# Patient Record
Sex: MALE | Race: BLACK OR AFRICAN AMERICAN | Employment: FULL TIME | URBAN - METROPOLITAN AREA
[De-identification: names, ages, dates, MRNs, and addresses within clinical notes are randomized per-mention and may not be internally consistent; named-entity substitution may affect disease eponyms.]

---

## 2017-01-31 RX ORDER — METOPROLOL SUCCINATE 50 MG/1
TABLET, EXTENDED RELEASE ORAL
Qty: 31 TAB | Status: SHIPPED | OUTPATIENT
Start: 2017-01-31 | End: 2018-02-07 | Stop reason: SDUPTHER

## 2017-03-02 RX ORDER — POTASSIUM CHLORIDE 750 MG/1
TABLET, FILM COATED, EXTENDED RELEASE ORAL
Qty: 28 TAB | Status: SHIPPED | OUTPATIENT
Start: 2017-03-02 | End: 2018-03-05 | Stop reason: SDUPTHER

## 2017-03-29 RX ORDER — INDAPAMIDE 1.25 MG/1
TABLET, FILM COATED ORAL
Qty: 31 TAB | Status: SHIPPED | OUTPATIENT
Start: 2017-03-29 | End: 2018-03-30 | Stop reason: SDUPTHER

## 2017-04-28 RX ORDER — LISINOPRIL 40 MG/1
TABLET ORAL
Qty: 30 TAB | Status: SHIPPED | OUTPATIENT
Start: 2017-04-28 | End: 2018-04-30 | Stop reason: SDUPTHER

## 2017-06-20 ENCOUNTER — OFFICE VISIT (OUTPATIENT)
Dept: INTERNAL MEDICINE CLINIC | Age: 57
End: 2017-06-20

## 2017-06-20 VITALS
WEIGHT: 212.8 LBS | OXYGEN SATURATION: 96 % | TEMPERATURE: 99.1 F | DIASTOLIC BLOOD PRESSURE: 94 MMHG | BODY MASS INDEX: 32.25 KG/M2 | HEART RATE: 60 BPM | RESPIRATION RATE: 16 BRPM | HEIGHT: 68 IN | SYSTOLIC BLOOD PRESSURE: 155 MMHG

## 2017-06-20 DIAGNOSIS — R41.3 MEMORY IMPAIRMENT: ICD-10-CM

## 2017-06-20 DIAGNOSIS — E66.9 OBESITY (BMI 30.0-34.9): ICD-10-CM

## 2017-06-20 DIAGNOSIS — I10 ESSENTIAL HYPERTENSION: Primary | ICD-10-CM

## 2017-06-20 DIAGNOSIS — R73.03 PREDIABETES: ICD-10-CM

## 2017-06-20 DIAGNOSIS — E78.2 MIXED HYPERLIPIDEMIA: ICD-10-CM

## 2017-06-20 DIAGNOSIS — F31.70 BIPOLAR DISORDER IN FULL REMISSION, MOST RECENT EPISODE UNSPECIFIED TYPE (HCC): ICD-10-CM

## 2017-06-20 DIAGNOSIS — G40.909 SEIZURE DISORDER (HCC): ICD-10-CM

## 2017-06-20 RX ORDER — AMLODIPINE BESYLATE 10 MG/1
TABLET ORAL
Qty: 31 TAB | Refills: 11 | Status: SHIPPED | OUTPATIENT
Start: 2017-06-20 | End: 2018-05-30 | Stop reason: SDUPTHER

## 2017-06-20 RX ORDER — HYDRALAZINE HYDROCHLORIDE 100 MG/1
TABLET, FILM COATED ORAL
Qty: 93 TAB | Refills: 11 | Status: SHIPPED | OUTPATIENT
Start: 2017-06-20 | End: 2018-05-30 | Stop reason: SDUPTHER

## 2017-06-20 NOTE — MR AVS SNAPSHOT
Visit Information Date & Time Provider Department Dept. Phone Encounter #  
 6/20/2017  2:30 PM Alma Rosa Sharma [facility] Internal Medicine [phone] [id number] Follow-up Instructions Return in about 2 weeks (around 7/4/2017). Upcoming Health Maintenance Date Due FOBT Q 1 YEAR AGE 50-75 4/7/2017 INFLUENZA AGE 9 TO ADULT 8/1/2017 DTaP/Tdap/Td series (2 - Td) 5/20/2026 Allergies as of 6/20/2017  Review Complete On: 6/20/2017 By: Mulu Ferreira, DO No Known Allergies Current Immunizations  Reviewed on 12/6/2016 Name Date Influenza Nasal Vaccine 10/15/2016 Not reviewed this visit You Were Diagnosed With   
  
 Codes Comments Essential hypertension    -  Primary ICD-10-CM: I10 
ICD-9-CM: 401.9 Mixed hyperlipidemia     ICD-10-CM: E78.2 ICD-9-CM: 272.2 Obesity (BMI 30.0-34.9)     ICD-10-CM: J14.5 ICD-9-CM: 278.00 Prediabetes     ICD-10-CM: R73.03 
ICD-9-CM: 790.29 Seizure disorder ([facility]     ICD-10-CM: G40.909 ICD-9-CM: 345.90 Memory impairment     ICD-10-CM: R41.3 ICD-9-CM: 780.93 Bipolar disorder in full remission, most recent episode unspecified type ([facility]     ICD-10-CM: F31.70 ICD-9-CM: 296.80 Vitals BP Pulse Temp Resp Height(growth percentile) Weight(growth percentile) (!) 155/94 (BP 1 Location: Right arm, BP Patient Position: Sitting) 60 99.1 °F (37.3 °C) (Oral) 16 5' 8\" (1.727 m) 212 lb 12.8 oz (96.5 kg) SpO2 BMI Smoking Status 96% 32.36 kg/m2 Never Smoker Vitals History BMI and BSA Data Body Mass Index Body Surface Area  
 32.36 kg/m 2 2.15 m 2 Preferred Pharmacy Pharmacy Name Phone [facility] [address] [phone] Your Updated Medication List  
  
   
This list is accurate as of: 6/20/17  3:32 PM.  Always use your most recent med list.  
  
  
  
  
 ALAWAY 0.025 % (0.035 %) ophthalmic solution Generic drug:  ketotifen Administer 1 Drop to both eyes two (2) times a day. amLODIPine 10 mg tablet Commonly known as:  Gómez Durie TAKE 1 TABLET BY MOUTH EVERY MORNING  
  
 ammonium lactate 12 % lotion Commonly known as:  LAC-HYDRIN  
  
 BUSPAR 15 mg tablet Generic drug:  busPIRone Take 15 mg by mouth two (2) times a day. hydrALAZINE 100 mg tablet Commonly known as:  APRESOLINE  
TAKE 1 TABLET BY MOUTH 3 times daily  
  
 indapamide 1.25 mg tablet Commonly known as:  LOZOL  
TAKE 1 TABLET BY MOUTH DAILY  
  
 KENDALL'S BABY SHAMPOO EX  
by Apply Externally route. latanoprost 0.005 % ophthalmic solution Commonly known as:  XALATAN  
  
 lisinopril 40 mg tablet Commonly known as:  PRINIVIL, ZESTRIL  
TAKE 1 TABLET BY MOUTH DAILY  
  
 LUMIGAN 0.03 % ophthalmic drops Generic drug:  bimatoprost  
Administer 1 Drop to both eyes every evening. metoprolol succinate 50 mg XL tablet Commonly known as:  TOPROL-XL  
TAKE ONE TABLET BY MOUTH AT BEDTIME  
  
 NAMENDA XR 28 mg capsule Generic drug:  memantine ER  
  
 PATANOL 0.1 % ophthalmic solution Generic drug:  olopatadine Administer 2 Drops to both eyes two (2) times a day. * PHENobarbital 60 mg tablet Commonly known as:  LUMINAL Take 2 Tabs by mouth nightly. Max Daily Amount: 120 mg.  
  
 * PHENobarbital 64.8 mg tablet Commonly known as:  LUMINAL  
  
 potassium chloride SR 10 mEq tablet Commonly known as:  KLOR-CON 10  
TAKE 1 TABLET BY MOUTH DAILY  
  
 XANAX 0.25 mg tablet Generic drug:  ALPRAZolam  
Take 0.125 mg by mouth two (2) times a day. ZyPREXA 10 mg tablet Generic drug:  OLANZapine Take 10 mg by mouth nightly. * Notice: This list has 2 medication(s) that are the same as other medications prescribed for you. Read the directions carefully, and ask your doctor or other care provider to review them with you. Prescriptions Sent to Pharmacy Refills hydrALAZINE (APRESOLINE) 100 mg tablet 11 Sig: TAKE 1 TABLET BY MOUTH 3 times daily Class: Normal  
 Pharmacy: [address] [place] [address] Ph #: [phone]  
 amLODIPine (NORVASC) 10 mg tablet 11 Sig: TAKE 1 TABLET BY MOUTH EVERY MORNING Class: Normal  
 [address] [facility]: [phone] Follow-up Instructions Return in about 2 weeks (around 7/4/2017). Introducing [facility]! Georgina Kurtz introduces [employer] patient portal. Now you can access parts of your medical record, email your doctor's office, and request medication refills online. 1. In your internet browser, go to [email or web address] 2. Click on the First Time User? Click Here link in the Sign In box. You will see the New Member Sign Up page. 3. Enter your [employer] Access Code exactly as it appears below. You will not need to use this code after youve completed the sign-up process. If you do not sign up before the expiration date, you must request a new code. · [employer] Access Code: [email or web address] Expires: 9/18/2017  3:20 PM 
 
4. Enter the last four digits of your Social Security Number (xxxx) and Date of Birth (mm/dd/yyyy) as indicated and click Submit. You will be taken to the next sign-up page. 5. Create a [employer] ID. This will be your [employer] login ID and cannot be changed, so think of one that is secure and easy to remember. 6. Create a [employer] password. You can change your password at any time. 7. Enter your Password Reset Question and Answer. This can be used at a later time if you forget your password. 8. Enter your e-mail address. You will receive e-mail notification when new information is available in [address] 9. Click Sign Up. You can now view and download portions of your medical record.  
10. Click the Download Summary menu link to download a portable copy of your medical information. If you have questions, please visit the Frequently Asked Questions section of the [employer] website. Remember, [employer] is NOT to be used for urgent needs. For medical emergencies, dial 911. Now available from your iPhone and Android! Please provide this summary of care documentation to your next provider. Your primary care clinician is listed as Yakov Flores. If you have any questions after today's visit, please call [phone].

## 2017-06-20 NOTE — PROGRESS NOTES
Health Maintenance Due   Topic Date Due    FOBT Q 1 YEAR AGE 50-75  04/07/2017       Chief Complaint   Patient presents with    Form Completion     Pt needs paperwork for [facility] Medication Evaluation     Pt wants to discuss blood pressure medications       1. Have you been to the ER, urgent care clinic since your last visit? Hospitalized since your last visit? No    2. Have you seen or consulted any other health care providers outside of the [address] [place] [address] since your last visit? Include any pap smears or colon screening. No      Patient is accompanied by counselor  I have received verbal consent from Yvonne Ford to discuss any/all medical information while they are present in the room.

## 2017-06-30 NOTE — PROGRESS NOTES
HISTORY OF PRESENT ILLNESS  Jada Coronel is a 60 y.o. male. Pt. comes in with his  for f/u. Has multiple medical problems. His BP is high despite taking medications. No headaches or other related symptoms. No recent seizures. Memory is poor. Followed by psychiatry for bipolar disorder. Needs a form filled to go to camp. Reports compliance with medications but not his diet. Med list and most recent labs/studies reviewed with pt. A1c was 5.7. Lipids are high. Denies smoking and using alcohol. No allergies. Trying to be active physically to control weight. Reports no other new c/o. Medication Evaluation   Pertinent negatives include no chest pain, no abdominal pain, no headaches and no shortness of breath. Hypertension    Pertinent negatives include no chest pain, no headaches, no dizziness and no shortness of breath. Follow Up Chronic Condition   Pertinent negatives include no chest pain, no abdominal pain, no headaches and no shortness of breath. Review of Systems   Constitutional: Negative. HENT: Negative for congestion. Eyes: Negative. Respiratory: Negative for cough and shortness of breath. Cardiovascular: Negative for chest pain and leg swelling. Gastrointestinal: Negative for abdominal pain and heartburn. Genitourinary: Negative for dysuria and frequency. Musculoskeletal: Negative for back pain, falls and joint pain. Skin: Negative for rash. Neurological: Positive for seizures. Negative for dizziness, sensory change and headaches. Psychiatric/Behavioral: Positive for memory loss. Negative for depression. The patient is nervous/anxious. The patient does not have insomnia. All other systems reviewed and are negative. Physical Exam   Constitutional: He appears well-developed and well-nourished. No distress. obese   HENT:   Head: Normocephalic and atraumatic.    Mouth/Throat: Oropharynx is clear and moist.   Eyes: Conjunctivae and EOM are normal. Pupils are equal, round, and reactive to light. No scleral icterus. Poor vision   Neck: Normal range of motion. Neck supple. No JVD present. No thyromegaly present. Cardiovascular: Normal rate, regular rhythm, normal heart sounds and intact distal pulses. No murmur heard. Pulmonary/Chest: Effort normal and breath sounds normal. No respiratory distress. He has no wheezes. He has no rales. Abdominal: Soft. Bowel sounds are normal. He exhibits no distension. There is no tenderness. obese   Musculoskeletal: He exhibits no edema or tenderness. Neurological: He is alert. No cranial nerve deficit. Coordination normal.   A+O to name, Wes, not date   Doesn't know president's name   Skin: Skin is warm and dry. No rash noted. Psychiatric: He has a normal mood and affect. His behavior is normal.   Slow speech  Chronic cognitive deficit   Nursing note and vitals reviewed. ASSESSMENT and PLAN  Chris Antunez was seen today for form completion, medication evaluation, hypertension and follow up chronic condition. Diagnoses and all orders for this visit:    Essential hypertension    Mixed hyperlipidemia    Obesity (BMI 30.0-34. 9)    Prediabetes    Seizure disorder ([address]    Memory impairment    Bipolar disorder in full remission, most recent episode unspecified type ([address]    Other orders  -     hydrALAZINE (APRESOLINE) 100 mg tablet; TAKE 1 TABLET BY MOUTH 3 times daily  -     amLODIPine (NORVASC) 10 mg tablet; TAKE 1 TABLET BY MOUTH EVERY MORNING      Follow-up Disposition:  Return in about 2 weeks (around 7/4/2017).    lab results and schedule of future lab studies reviewed with patient  reviewed diet, exercise and weight control  reviewed medications and side effects in detail  Monitor BP at home and keep a log  Discussed importance of watching salt and sodium intake, losing weight  F/u with other MD's as scheduled

## 2017-07-07 ENCOUNTER — OFFICE VISIT (OUTPATIENT)
Dept: INTERNAL MEDICINE CLINIC | Age: 57
End: 2017-07-07

## 2017-07-07 VITALS
HEART RATE: 60 BPM | TEMPERATURE: 98.4 F | DIASTOLIC BLOOD PRESSURE: 84 MMHG | BODY MASS INDEX: 31.95 KG/M2 | WEIGHT: 210.8 LBS | HEIGHT: 68 IN | SYSTOLIC BLOOD PRESSURE: 135 MMHG | OXYGEN SATURATION: 97 %

## 2017-07-07 DIAGNOSIS — F31.70 BIPOLAR DISORDER IN FULL REMISSION, MOST RECENT EPISODE UNSPECIFIED TYPE (HCC): ICD-10-CM

## 2017-07-07 DIAGNOSIS — E78.2 MIXED HYPERLIPIDEMIA: ICD-10-CM

## 2017-07-07 DIAGNOSIS — I10 ESSENTIAL HYPERTENSION: Primary | ICD-10-CM

## 2017-07-07 DIAGNOSIS — Z00.00 MEDICARE ANNUAL WELLNESS VISIT, INITIAL: ICD-10-CM

## 2017-07-07 DIAGNOSIS — E66.9 OBESITY (BMI 30.0-34.9): ICD-10-CM

## 2017-07-07 DIAGNOSIS — R73.9 HYPERGLYCEMIA: ICD-10-CM

## 2017-07-07 DIAGNOSIS — N40.1 BENIGN PROSTATIC HYPERPLASIA WITH LOWER URINARY TRACT SYMPTOMS, UNSPECIFIED MORPHOLOGY: ICD-10-CM

## 2017-07-07 NOTE — PATIENT INSTRUCTIONS
PATIENT INSTRUCTIONS:  Todays date:  Medicare Part B Preventive Services Guidelines/Limitations Date last completed and Frequency Due Date   Bone Mass Measurement  (age 72 & older, biennial) Requires diagnosis related to osteoporosis or estrogen deficiency. Biennial benefit unless patient has history of long-term glucocorticoid tx or baseline is needed because initial test was by other method or for  patients with vertebral abnormalities on x-ray   N/A      Recommended every 2 years N/A          As recommended by your PCP or Specialist     Cardiovascular Screening Blood Tests (every 5 years)  Total cholesterol, HDL, Triglycerides   Order as a panel if possible Completed:   11-1-16    As recommended by your PCP DUE: 2021      As recommended by your PCP or Specialist   HIV Screening  HIV Screening (includes patients at high risk and includes any patient that requests the test and pregnant women   Covered once every 12 months. N/A N/A   Hepatitis C screening tests Indicated for patients with at least one of the following: current or past history of IV drug use, those who had blood transfusion before 1992, those born between [facility]. Declined Declined   Colorectal Cancer Screening  -Fecal occult blood test (annual)  -Flexible sigmoidoscopy (5y)  -Screening colonoscopy (10y)  -Barium Enema Age 49-80;  After age [de-identified] if history of abnormal results Not Completed:        Recommended every 5 to 10 years  Due: NOW          As recommended by your PCP or Specialist     Hepatitis B vaccines  (covered for patients at high risk- hemophilia, ESRD, DM, body fluid contact    _______________  Prostate Cancer Screening (annually up to age 76)  -Digital rectal exam  -Prostate specific antigen (PSA)   Three shot series covered once              __________________  Annually (age 48 or over), ARLEEN not paid separately when covered E/M service is provided on the same date N/A                _______________  Completed:  11-2-16      Recommended  annually N/A                ______________  Due:  November 2017   Seasonal Influenza Vaccination (annually)  Completed:   10-15-16    Recommended Annually   Due:   Fall 2017   TDAP Vaccination Covered by Medicare part D through the pharmacy- PCP provides prescription Not Completed:       Recommended every 10 years   Declined   Zoster (Shingles) Vaccination Covered by Medicare Part D through the pharmacy- PCP provides prescription Not Completed:       Recommended once over age 61  Declined    This is a one-time vaccine   Pneumococcal Vaccination (once after 72)  Not Completed:       Recommended once over the age of 72     Declined           Prevnar 13 vaccine  Prevnar 15        Recommended once over the age of 72 Due: 1 year following Pneumococcal vaccine      This is a one-time vaccine       Lung Cancer Screening-with low dose CT for patientswho meet all criteria:   -Age 50-69  -either a current smoker or have quit in the past 15 yrs  -have a smoking history of 30 pack years or more   Covered every 12 months N/A N/A     Please contact RN/Nurse Navigator with any questions about your visit or instructions today. Thank you for the opportunity for us to participate in your care.

## 2017-07-07 NOTE — MR AVS SNAPSHOT
Visit Information Date & Time Provider Department Dept. Phone Encounter #  
 7/7/2017  3:00 PM Fred Santo, [address] [facility] Internal Medicine [phone] [id number] Follow-up Instructions Return in about 4 months (around 11/7/2017). Upcoming Health Maintenance Date Due FOBT Q 1 YEAR AGE 50-75 4/7/2017 INFLUENZA AGE 9 TO ADULT 8/1/2017 DTaP/Tdap/Td series (2 - Td) 5/20/2026 Allergies as of 7/7/2017  Review Complete On: 7/7/2017 By: Fred Santo, DO No Known Allergies Current Immunizations  Reviewed on 12/6/2016 Name Date Influenza Nasal Vaccine 10/15/2016 Not reviewed this visit You Were Diagnosed With   
  
 Codes Comments Essential hypertension    -  Primary ICD-10-CM: I10 
ICD-9-CM: 401.9 Hyperglycemia     ICD-10-CM: R73.9 ICD-9-CM: 790.29 Mixed hyperlipidemia     ICD-10-CM: E78.2 ICD-9-CM: 272.2 Obesity (BMI 30.0-34.9)     ICD-10-CM: T99.4 ICD-9-CM: 278.00 Bipolar disorder in full remission, most recent episode unspecified type ([facility]     ICD-10-CM: F31.70 ICD-9-CM: 296.80 Benign prostatic hyperplasia with lower urinary tract symptoms, unspecified morphology     ICD-10-CM: N40.1 ICD-9-CM: 600.01 Vitals BP Pulse Temp Height(growth percentile) Weight(growth percentile) SpO2  
 135/84 (BP 1 Location: Left arm, BP Patient Position: Sitting) 60 98.4 °F (36.9 °C) (Oral) 5' 8\" (1.727 m) 210 lb 12.8 oz (95.6 kg) 97% BMI Smoking Status 32.05 kg/m2 Never Smoker BMI and BSA Data Body Mass Index Body Surface Area 32.05 kg/m 2 2.14 m 2 Preferred Pharmacy Pharmacy Name Phone [facility] [address] [phone] Your Updated Medication List  
  
   
This list is accurate as of: 7/7/17  3:33 PM.  Always use your most recent med list.  
  
  
  
  
 ALAWAY 0.025 % (0.035 %) ophthalmic solution Generic drug:  ketotifen Administer 1 Drop to both eyes two (2) times a day. amLODIPine 10 mg tablet Commonly known as:  Delcie Wharton TAKE 1 TABLET BY MOUTH EVERY MORNING  
  
 ammonium lactate 12 % lotion Commonly known as:  LAC-HYDRIN  
  
 BUSPAR 15 mg tablet Generic drug:  busPIRone Take 15 mg by mouth two (2) times a day. hydrALAZINE 100 mg tablet Commonly known as:  APRESOLINE  
TAKE 1 TABLET BY MOUTH 3 times daily  
  
 indapamide 1.25 mg tablet Commonly known as:  LOZOL  
TAKE 1 TABLET BY MOUTH DAILY  
  
 KENDALL'S BABY SHAMPOO EX  
by Apply Externally route. latanoprost 0.005 % ophthalmic solution Commonly known as:  XALATAN  
  
 lisinopril 40 mg tablet Commonly known as:  PRINIVIL, ZESTRIL  
TAKE 1 TABLET BY MOUTH DAILY  
  
 LUMIGAN 0.03 % ophthalmic drops Generic drug:  bimatoprost  
Administer 1 Drop to both eyes every evening. metoprolol succinate 50 mg XL tablet Commonly known as:  TOPROL-XL  
TAKE ONE TABLET BY MOUTH AT BEDTIME  
  
 NAMENDA XR 28 mg capsule Generic drug:  memantine ER  
  
 PATANOL 0.1 % ophthalmic solution Generic drug:  olopatadine Administer 2 Drops to both eyes two (2) times a day. * PHENobarbital 60 mg tablet Commonly known as:  LUMINAL Take 2 Tabs by mouth nightly. Max Daily Amount: 120 mg.  
  
 * PHENobarbital 64.8 mg tablet Commonly known as:  LUMINAL  
  
 potassium chloride SR 10 mEq tablet Commonly known as:  KLOR-CON 10  
TAKE 1 TABLET BY MOUTH DAILY  
  
 XANAX 0.25 mg tablet Generic drug:  ALPRAZolam  
Take 0.125 mg by mouth two (2) times a day. ZyPREXA 10 mg tablet Generic drug:  OLANZapine Take 10 mg by mouth nightly. * Notice: This list has 2 medication(s) that are the same as other medications prescribed for you. Read the directions carefully, and ask your doctor or other care provider to review them with you. We Performed the Following ALT [id number] CPT(R)] AST C9944338 CPT(R)] HEMOGLOBIN A1C WITH EAG [43405 CPT(R)] LIPID PANEL [72636 CPT(R)] METABOLIC PANEL, BASIC [20532 CPT(R)] PROSTATE SPECIFIC AG (PSA) [id number] CPT(R)] Follow-up Instructions Return in about 4 months (around 11/7/2017). Patient Instructions PATIENT INSTRUCTIONS: 
Todays date: 
Medicare Part B Preventive Services Guidelines/Limitations Date last completed and Frequency Due Date Bone Mass Measurement 
(age 72 & older, biennial) Requires diagnosis related to osteoporosis or estrogen deficiency. Biennial benefit unless patient has history of long-term glucocorticoid tx or baseline is needed because initial test was by other method or for 
patients with vertebral abnormalities on x-ray N/A Recommended every 2 years N/A As recommended by your PCP or Specialist 
  
Cardiovascular Screening Blood Tests (every 5 years) Total cholesterol, HDL, Triglycerides Order as a panel if possible Completed:  
11-1-16 As recommended by your PCP DUE: 2021 As recommended by your PCP or Specialist  
HIV Screening HIV Screening (includes patients at high risk and includes any patient that requests the test and pregnant women Covered once every 12 months. N/A N/A Hepatitis C screening tests Indicated for patients with at least one of the following: current or past history of IV drug use, those who had blood transfusion before 1992, those born between [facility]. Declined Declined Colorectal Cancer Screening 
-Fecal occult blood test (annual) -Flexible sigmoidoscopy (5y) 
-Screening colonoscopy (10y) -Barium Enema Age 49-80; After age [de-identified] if history of abnormal results Not Completed:  
  
 
Recommended every 5 to 10 years  Due: NOW As recommended by your PCP or Specialist 
  
Hepatitis B vaccines (covered for patients at high risk- hemophilia, ESRD, DM, body fluid contact 
 
_______________ Prostate Cancer Screening (annually up to age 76) -Digital rectal exam 
 -Prostate specific antigen (PSA) Three shot series covered once 
 
 
 
 
 
 
__________________ Annually (age 48 or over), ARLEEN not paid separately when covered E/M service is provided on the same date N/A 
 
 
 
 
 
 
 
_______________ Completed: 
11-2-16 Recommended 
annually N/A 
 
 
 
 
 
 
 
______________ Due: 
November 2017 Seasonal Influenza Vaccination (annually)  Completed:  
10-15-16 Recommended Annually Due:  
Fall 2017 TDAP Vaccination Covered by Medicare part D through the pharmacy- PCP provides prescription Not Completed:  
 
 
Recommended every 10 years Declined Zoster (Shingles) Vaccination Covered by Medicare Part D through the pharmacy- PCP provides prescription Not Completed:  
 
 
Recommended once over age 61  Declined This is a one-time vaccine Pneumococcal Vaccination (once after 65)  Not Completed:  
 
 
Recommended once over the age of 72 Declined Prevnar 13 vaccine  Prevnar 13  Recommended once over the age of 72 Due: 1 year following Pneumococcal vaccine This is a one-time vaccine Lung Cancer Screening-with low dose CT for patientswho meet all criteria:  
-Age 50-69 
-either a current smoker or have quit in the past 15 yrs 
-have a smoking history of 30 pack years or more Covered every 12 months N/A N/A Please contact RN/Nurse Navigator with any questions about your visit or instructions today. Thank you for the opportunity for us to participate in your care. Introducing [facility]! [facility] introduces [employer] patient portal. Now you can access parts of your medical record, email your doctor's office, and request medication refills online. 1. In your internet browser, go to [email or web address] 2. Click on the First Time User? Click Here link in the Sign In box. You will see the New Member Sign Up page. 3. Enter your [employer] Access Code exactly as it appears below.  You will not need to use this code after youve completed the sign-up process. If you do not sign up before the expiration date, you must request a new code. · [employer] Access Code: [email or web address] Expires: 9/18/2017  3:20 PM 
 
4. Enter the last four digits of your Social Security Number (xxxx) and Date of Birth (mm/dd/yyyy) as indicated and click Submit. You will be taken to the next sign-up page. 5. Create a [employer] ID. This will be your [employer] login ID and cannot be changed, so think of one that is secure and easy to remember. 6. Create a [employer] password. You can change your password at any time. 7. Enter your Password Reset Question and Answer. This can be used at a later time if you forget your password. 8. Enter your e-mail address. You will receive e-mail notification when new information is available in [address] 9. Click Sign Up. You can now view and download portions of your medical record. 10. Click the Download Summary menu link to download a portable copy of your medical information. If you have questions, please visit the Frequently Asked Questions section of the [employer] website. Remember, [employer] is NOT to be used for urgent needs. For medical emergencies, dial 911. Now available from your iPhone and Android! Please provide this summary of care documentation to your next provider. Your primary care clinician is listed as Rosalva Mendoza. If you have any questions after today's visit, please call [phone].

## 2017-07-07 NOTE — PROGRESS NOTES
Patient is here for f/u on b/p.     Visit Vitals    /84 (BP 1 Location: Left arm, BP Patient Position: Sitting)    Pulse 60    Temp 98.4 °F (36.9 °C) (Oral)    Ht 5' 8\" (1.727 m)    Wt 210 lb 12.8 oz (95.6 kg)    SpO2 97%    BMI 32.05 kg/m2

## 2017-07-07 NOTE — PROGRESS NOTES
Medicare Wellness Visit      Denis Wise is a 60 y.o. male and presents for Annual Medicare Wellness Visit. Assessment of cognitive impairment: Alert and oriented x 3. Depression Screen:   PHQ over the last two weeks 7/7/2017   Little interest or pleasure in doing things Not at all   Feeling down, depressed or hopeless Not at all   Total Score PHQ 2 0       Fall Risk Assessment:    Fall Risk Assessment, last 12 mths 7/7/2017   Able to walk? Yes   Fall in past 12 months? No       Abuse Screen:   Abuse Screening Questionnaire 7/7/2017   Do you ever feel afraid of your partner? N   Are you in a relationship with someone who physically or mentally threatens you? N   Is it safe for you to go home? Y       Activities of Daily Living:  Partial assistance. Requires assistance with: medications, meals and no other ADLs. Patient does not drive  Patient handle his/her own medications  no Use of pill box  No.  Medications are administered by staff at patient home  Activities of Daily Living:   ADL Assessment 7/7/2017   Feeding yourself No Help Needed   Getting from bed to chair No Help Needed   Getting dressed No Help Needed   Bathing or showering No Help Needed   Walk across the room (includes cane/walker) No Help Needed   Using the telphone No Help Needed   Taking your medications Help Needed   Preparing meals Help Needed   Managing money (expenses/bills) No Help Needed   Moderately strenuous housework (laundry) No Help Needed   Shopping for personal items (toiletries/medicines) No Help Needed   Shopping for groceries No Help Needed   Driving Help Needed   Climbing a flight of stairs No Help Needed   Getting to places beyond walking distances Help Needed       Health Maintenance:  Daily Aspirin: no  Bone Density: N/A  Glaucoma Screening: yes. Patient sees Dr. Maude Wagoner for annual eye exams  Immunizations:    Tetanus: patient declines. Influenza: up to date. Shingles: declined.  Information provided and recommended consideration after age 61  PPSV-23: not up to date - Pt declined. Information provided with recommendation to have vaccine after age 72 Prevnar-13: not up to date - pt declined. Information provided with recommendation to have vaccine after age 72 and to be administered 1 year following PPSV-23     Cancer screening:   Colon: not up to date - Offered patient referral for GI. Patient declined referral at this time, but will call office if decides to schedule colonoscopy  Prostate:  up to date    Alcohol Risk Screen:   On any occasion during the past 3 months, have you had more than 3 drinks(female) or 4 drinks (male) containing alcohol in one? No  Do you average more than 7 drinks (female) or 14 drinks (male) per week? No  Type and amount: none    Hearing Loss: Moderate. Describes as wax build up bilaterally. Does not wear hearing aides    Vision Loss:   Wears glasses  Yes. Sees Dr. Phi Rubio regularly    Adult Nutrition Screen:  No risk factors noted. Advance Care Planning:   End of Life Planning: has NO advanced directive  - add't info provided      Medications/Allergies: Reviewed with patient  Prior to Admission medications    Medication Sig Start Date End Date Taking? Authorizing Provider   hydrALAZINE (APRESOLINE) 100 mg tablet TAKE 1 TABLET BY MOUTH 3 times daily 6/20/17  Yes Lincoln Stout DO   amLODIPine (NORVASC) 10 mg tablet TAKE 1 TABLET BY MOUTH EVERY MORNING 6/20/17  Yes Lincoln Stout DO   lisinopril (PRINIVIL, ZESTRIL) 40 mg tablet TAKE 1 TABLET BY MOUTH DAILY 4/28/17  Yes Uziel Hutchins MD   indapamide (LOZOL) 1.25 mg tablet TAKE 1 TABLET BY MOUTH DAILY 3/29/17  Yes Uziel Hutchins MD   potassium chloride SR (KLOR-CON 10) 10 mEq tablet TAKE 1 TABLET BY MOUTH DAILY 3/2/17  Yes Uziel Hutchins MD   metoprolol succinate (TOPROL-XL) 50 mg XL tablet TAKE ONE TABLET BY MOUTH AT BEDTIME 1/31/17  Yes Uziel Hutchins MD   SOAP (KENDALL'S BABY SHAMPOO EX) by Apply Externally route. Yes Historical Provider   latanoprost (XALATAN) 0.005 % ophthalmic solution  9/25/15  Yes Historical Provider   NAMENDA XR 28 mg capsule  10/15/15  Yes Historical Provider   PHENobarbital (LUMINAL) 60 mg tablet Take 2 Tabs by mouth nightly. Max Daily Amount: 120 mg. 10/27/15  Yes Rajni Gruber MD   olanzapine (ZYPREXA) 10 mg tablet Take 10 mg by mouth nightly. Yes Historical Provider   bimatoprost (LUMIGAN) 0.03 % ophthalmic drops Administer 1 Drop to both eyes every evening. Yes Bay Chaney MD   olopatadine (PATANOL) 0.1 % ophthalmic solution Administer 2 Drops to both eyes two (2) times a day. Yes Bay Chaney MD   PHENobarbital (LUMINAL) 64.8 mg tablet  2/15/16   Historical Provider   ketotifen (ALAWAY) 0.025 % ophthalmic solution Administer 1 Drop to both eyes two (2) times a day. Historical Provider   ammonium lactate (LAC-HYDRIN) 12 % lotion  9/22/14   Historical Provider   alprazolam (XANAX) 0.25 mg tablet Take 0.125 mg by mouth two (2) times a day. Bay Chaney MD   busPIRone (BUSPAR) 15 mg tablet Take 15 mg by mouth two (2) times a day. Bay Chaney MD       No Known Allergies    Objective:  Visit Vitals    /84 (BP 1 Location: Left arm, BP Patient Position: Sitting)    Pulse 60    Temp 98.4 °F (36.9 °C) (Oral)    Ht 5' 8\" (1.727 m)    Wt 210 lb 12.8 oz (95.6 kg)    SpO2 97%    BMI 32.05 kg/m2    Body mass index is 32.05 kg/(m^2). Problem List: Reviewed with patient and discussed risk factors. Patient Active Problem List   Diagnosis Code    Hip arthritis M16.10    Seizure disorder ([facility] [address] G40.909    Seizures ([address] R56.9   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 F70    Hypertension I10    Brain trauma ([facility] [address] S06. 9X5A    Intellectual disability F68    ACP (advance care planning) Z70.80    DOROTA (generalized anxiety disorder) F41.1    Dementia with behavioral disturbance F03.91    Prediabetes R73.03    Obesity (BMI 30.0-34. 9) E66.9    Mixed hyperlipidemia E78.2    Memory impairment R41.3    Bipolar disorder in full remission ([facility] [address] F31.70    Hyperglycemia R73.9       PSH: Reviewed with patient  History reviewed. No pertinent surgical history. SH: Reviewed with patient  Social History   Substance Use Topics    Smoking status: Never Smoker    Smokeless tobacco: Never Used    Alcohol use No       FH: Reviewed with patient  History reviewed. No pertinent family history. Current medical providers:    Patient Care Team:  Ailyn Olivas DO as PCP - General (Internal Medicine)  Haley Montana LPN as Ambulatory Care Navigator  Phyllis Krishnamurthy MD as Physician (Ophthalmology)    Plan:      Orders Placed This Encounter    LIPID PANEL    ALT    AST    HEMOGLOBIN A1C WITH EAG    METABOLIC PANEL, BASIC    PROSTATE SPECIFIC AG       Health Maintenance   Topic Date Due    FOBT Q 1 YEAR AGE 50-75  04/07/2017    INFLUENZA AGE 9 TO ADULT  08/01/2017    MEDICARE YEARLY EXAM  07/08/2018    DTaP/Tdap/Td series (2 - Td) 05/20/2026    Hepatitis C Screening  Addressed         Urinary/ Fecal Incontinence: No    Regular physical exercise: Yes. 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 AVS and Medicare Part B Preventive Services Table printed, given to pt and reviewed. See table for findings under Recommendation and Scheduled. All of the patient's questions were answered.

## 2017-07-29 NOTE — PROGRESS NOTES
HISTORY OF PRESENT ILLNESS  Jane Irene is a 60 y.o. male. Pt. comes in with his  for f/u. Has multiple medical problems. His BP is stable on meds. No recent seizures. Memory is poor. Followed by psychiatry for bipolar disorder. Reports compliance with medications but not his diet. Med list and most recent labs/studies reviewed with pt. A1c was 5.7. Lipids have been high too. Denies smoking or alcohol use l. No allergies. Trying to be active physically to control weight. Needs repeat labs. Reports no other new c/o. Hypertension    Pertinent negatives include no chest pain, no headaches, no dizziness and no shortness of breath. Follow Up Chronic Condition   Pertinent negatives include no chest pain, no abdominal pain, no headaches and no shortness of breath. Review of Systems   Constitutional: Negative. HENT: Negative for congestion. Eyes: Negative. Respiratory: Negative for cough and shortness of breath. Cardiovascular: Negative for chest pain and leg swelling. Gastrointestinal: Negative for abdominal pain and heartburn. Genitourinary: Negative for dysuria and frequency. Musculoskeletal: Negative for back pain, falls and joint pain. Skin: Negative for rash. Neurological: Positive for seizures. Negative for dizziness, sensory change and headaches. Psychiatric/Behavioral: Positive for memory loss. Negative for depression. The patient is nervous/anxious. The patient does not have insomnia. All other systems reviewed and are negative. Physical Exam   Constitutional: He appears well-developed and well-nourished. No distress. obese   HENT:   Head: Normocephalic and atraumatic. Mouth/Throat: Oropharynx is clear and moist.   Eyes: Conjunctivae are normal. No scleral icterus. Poor vision   Neck: Normal range of motion. Neck supple. No JVD present. No thyromegaly present.    Cardiovascular: Normal rate, regular rhythm, normal heart sounds and intact distal pulses. No murmur heard. Pulmonary/Chest: Effort normal and breath sounds normal. No respiratory distress. He has no wheezes. He has no rales. Abdominal: Soft. Bowel sounds are normal. He exhibits no distension. There is no tenderness. obese   Musculoskeletal: He exhibits no edema or tenderness. Neurological: He is alert. No cranial nerve deficit. Coordination normal.   A+O to name, Wes, not date   Doesn't know president's name   Skin: Skin is warm and dry. No rash noted. Psychiatric: He has a normal mood and affect. His behavior is normal.   Slow speech  Chronic cognitive deficit   Nursing note and vitals reviewed. ASSESSMENT and PLAN  Diagnoses and all orders for this visit:    1. Essential hypertension  -     METABOLIC PANEL, BASIC    2. Hyperglycemia  -     HEMOGLOBIN A1C WITH EAG  -     METABOLIC PANEL, BASIC    3. Mixed hyperlipidemia  -     LIPID PANEL  -     ALT  -     AST    4. Obesity (BMI 30.0-34.9)    5. Bipolar disorder in full remission, most recent episode unspecified type ([facility] [address]    6. Benign prostatic hyperplasia with lower urinary tract symptoms, unspecified morphology  -     PROSTATE SPECIFIC AG    7. Medicare annual wellness visit, initial      Follow-up Disposition:  Return in about 4 months (around 11/7/2017).    lab results and schedule of future lab studies reviewed with patient  reviewed diet, exercise and weight control  reviewed medications and side effects in detail  F/u with other MD's as scheduled  I have reviewed and agree with Medicare Annual Wellness visit done by Jakob Reed

## 2017-10-20 ENCOUNTER — OFFICE VISIT (OUTPATIENT)
Dept: INTERNAL MEDICINE CLINIC | Age: 57
End: 2017-10-20

## 2017-10-20 VITALS
RESPIRATION RATE: 19 BRPM | OXYGEN SATURATION: 95 % | HEIGHT: 68 IN | DIASTOLIC BLOOD PRESSURE: 78 MMHG | WEIGHT: 211 LBS | HEART RATE: 67 BPM | BODY MASS INDEX: 31.98 KG/M2 | SYSTOLIC BLOOD PRESSURE: 127 MMHG

## 2017-10-20 DIAGNOSIS — R73.9 HYPERGLYCEMIA: ICD-10-CM

## 2017-10-20 DIAGNOSIS — G40.909 SEIZURE DISORDER (HCC): ICD-10-CM

## 2017-10-20 DIAGNOSIS — F31.70 BIPOLAR DISORDER IN FULL REMISSION, MOST RECENT EPISODE UNSPECIFIED TYPE (HCC): ICD-10-CM

## 2017-10-20 DIAGNOSIS — E78.2 MIXED HYPERLIPIDEMIA: ICD-10-CM

## 2017-10-20 DIAGNOSIS — R73.03 PREDIABETES: ICD-10-CM

## 2017-10-20 DIAGNOSIS — F79 INTELLECTUAL DISABILITY: ICD-10-CM

## 2017-10-20 DIAGNOSIS — I10 ESSENTIAL HYPERTENSION: Primary | ICD-10-CM

## 2017-10-20 DIAGNOSIS — Z11.1 SCREENING-PULMONARY TB: ICD-10-CM

## 2017-10-20 DIAGNOSIS — R41.3 MEMORY IMPAIRMENT: ICD-10-CM

## 2017-10-20 RX ORDER — CLONIDINE HYDROCHLORIDE 0.3 MG/1
0.3 TABLET ORAL 2 TIMES DAILY
COMMUNITY
End: 2017-10-27 | Stop reason: SDUPTHER

## 2017-10-20 RX ORDER — ERYTHROMYCIN 5 MG/G
OINTMENT OPHTHALMIC
COMMUNITY
End: 2020-09-29 | Stop reason: ALTCHOICE

## 2017-10-20 RX ORDER — NEBIVOLOL 5 MG/1
TABLET ORAL DAILY
COMMUNITY
End: 2018-02-07 | Stop reason: SDUPTHER

## 2017-10-20 NOTE — PROGRESS NOTES
Health Maintenance Due   Topic Date Due    FOBT Q 1 YEAR AGE 50-75  04/07/2017       Chief Complaint   Patient presents with    Hypertension    Cholesterol Problem    Arthritis       1. Have you been to the ER, urgent care clinic since your last visit? Hospitalized since your last visit? No    2. Have you seen or consulted any other health care providers outside of the [address] [place] [address] since your last visit? Include any pap smears or colon screening. No    3) Do you have an Advance Directive on file? no    4) Are you interested in receiving information on Advance Directives? NO      Patient is accompanied by self I have received verbal consent from Hannah Nelson to discuss any/all medical information while they are present in the room.

## 2017-10-20 NOTE — LETTER
12/6/2017 11:56 AM 
 
Mr. Santino Adair [address] [id number] Dear Santino Kahn: 
 
Please find your most recent results below. Resulted Orders LIPID PANEL Result Value Ref Range Cholesterol, total 206 (H) 100 - 199 mg/dL Triglyceride 143 0 - 149 mg/dL HDL Cholesterol 36 (L) >39 mg/dL VLDL, calculated 29 5 - 40 mg/dL LDL, calculated 141 (H) 0 - 99 mg/dL Narrative Performed at:  [address]  [id number] : Maddy Payan MD, Phone:  [phone] METABOLIC PANEL, COMPREHENSIVE Result Value Ref Range Glucose 97 65 - 99 mg/dL BUN 11 6 - 24 mg/dL Creatinine 1.02 0.76 - 1.27 mg/dL GFR est non-AA 80 >59 mL/min/1.73 GFR est AA 93 >59 mL/min/1.73  
 BUN/Creatinine ratio 11 9 - 20 Sodium 135 134 - 144 mmol/L Potassium 4.0 3.5 - 5.2 mmol/L Chloride 97 96 - 106 mmol/L  
 CO2 27 18 - 29 mmol/L Calcium 8.7 8.7 - 10.2 mg/dL Protein, total 6.8 6.0 - 8.5 g/dL Albumin 3.9 3.5 - 5.5 g/dL GLOBULIN, TOTAL 2.9 1.5 - 4.5 g/dL A-G Ratio 1.3 1.2 - 2.2 Bilirubin, total 0.3 0.0 - 1.2 mg/dL Alk. phosphatase 63 39 - 117 IU/L  
 AST (SGOT) 21 0 - 40 IU/L  
 ALT (SGPT) 15 0 - 44 IU/L Narrative Performed at:  [address], [place]  [id number] : Maddy Payan MD, Phone:  [phone] CBC W/O DIFF Result Value Ref Range WBC 7.7 3.4 - 10.8 x10E3/uL  
 RBC 4.15 4.14 - 5.80 x10E6/uL HGB 13.3 13.0 - 17.7 g/dL Comment: **Please note reference interval change** HCT 38.5 37.5 - 51.0 % MCV 93 79 - 97 fL  
 MCH 32.0 26.6 - 33.0 pg  
 MCHC 34.5 31.5 - 35.7 g/dL  
 RDW 13.3 12.3 - 15.4 % PLATELET 085 767 - 693 x10E3/uL Narrative Performed at:  [address]  [id number] : Maddy Payan MD, Phone:  [phone] HEMOGLOBIN A1C WITH EAG Result Value Ref Range Hemoglobin A1c 5.3 4.8 - 5.6 % Comment:  
            Pre-diabetes: 5.7 - 6.4 Diabetes: >6.4 Glycemic control for adults with diabetes: <7.0 Estimated average glucose 105 mg/dL Narrative Performed at:  [address]  [id number] : Shelly Lewis MD, Phone:  [phone] CVD REPORT Result Value Ref Range INTERPRETATION Note Comment:  
   Supplemental report is available. Narrative Performed at:  [address] [place] [address] [address] [place] [address]  [id number] : Lillian Ponce PhD, Phone:  [phone] RECOMMENDATIONS: 
Borderline high cholesterol ---- watch fatty food/exercise Otherwise labs are stable Please call me if you have any questions: [phone] Sincerely, 
 
 
Jaleel Austen, DO

## 2017-10-20 NOTE — MR AVS SNAPSHOT
Visit Information Date & Time Provider Department Dept. Phone Encounter #  
 10/20/2017  2:00 PM Kenton Gutiérrez, [address] [facility] Internal [facility] [phone] [id number] Follow-up Instructions Return in about 6 months (around 4/20/2018). Your Appointments 11/3/2017  1:45 PM  
ROUTINE CARE with Kenton Gutiérrez DO [facility] ([facility]) Appt Note: follow up 4m [address] [place] [address] [address] [facility] [address]  
[phone]  
  
   
 [address] [facility] [address] Upcoming Health Maintenance Date Due FOBT Q 1 YEAR AGE 50-75 4/7/2017 MEDICARE YEARLY EXAM 7/8/2018 DTaP/Tdap/Td series (2 - Td) 5/20/2026 Allergies as of 10/20/2017  Review Complete On: 10/20/2017 By: Kenton Gutiérrez DO No Known Allergies Current Immunizations  Reviewed on 12/6/2016 Name Date Influenza Nasal Vaccine 10/15/2016 Not reviewed this visit You Were Diagnosed With   
  
 Codes Comments Essential hypertension    -  Primary ICD-10-CM: I10 
ICD-9-CM: 401.9 Prediabetes     ICD-10-CM: R73.03 
ICD-9-CM: 790.29 Mixed hyperlipidemia     ICD-10-CM: E78.2 ICD-9-CM: 272.2 Hyperglycemia     ICD-10-CM: R73.9 ICD-9-CM: 790.29 Memory impairment     ICD-10-CM: R41.3 ICD-9-CM: 780.93 Seizure disorder ([facility] [address]     ICD-10-CM: G40.909 ICD-9-CM: 345.90 Bipolar disorder in full remission, most recent episode unspecified type ([facility] [address]     ICD-10-CM: F31.70 ICD-9-CM: 296.80 Intellectual disability     ICD-10-CM: F79 
ICD-9-CM: 005 Screening-pulmonary TB     ICD-10-CM: Z11.1 ICD-9-CM: V74.1 Vitals BP Pulse Resp Height(growth percentile) Weight(growth percentile) SpO2  
 127/78 (BP 1 Location: Right arm, BP Patient Position: Sitting) 67 19 5' 8\" (1.727 m) 211 lb (95.7 kg) 95% BMI Smoking Status 32.08 kg/m2 Never Smoker Vitals History BMI and BSA Data Body Mass Index Body Surface Area 32.08 kg/m 2 2.14 m 2 Preferred Pharmacy Pharmacy Name Phone [facility] [address] [phone] Your Updated Medication List  
  
   
This list is accurate as of: 10/20/17  2:26 PM.  Always use your most recent med list.  
  
  
  
  
 ALAWAY 0.025 % (0.035 %) ophthalmic solution Generic drug:  ketotifen Administer 1 Drop to both eyes two (2) times a day. amLODIPine 10 mg tablet Commonly known as:  Tad Nelida TAKE 1 TABLET BY MOUTH EVERY MORNING  
  
 ammonium lactate 12 % lotion Commonly known as:  LAC-HYDRIN  
  
 BUSPAR 15 mg tablet Generic drug:  busPIRone Take 15 mg by mouth two (2) times a day. BYSTOLIC 5 mg tablet Generic drug:  nebivolol Take  by mouth daily. cloNIDine HCl 0.3 mg tablet Commonly known as:  CATAPRES Take 0.3 mg by mouth two (2) times a day. erythromycin ophthalmic ointment Commonly known as:  ILOTYCIN Administer  to both eyes nightly. hydrALAZINE 100 mg tablet Commonly known as:  APRESOLINE  
TAKE 1 TABLET BY MOUTH 3 times daily  
  
 indapamide 1.25 mg tablet Commonly known as:  LOZOL  
TAKE 1 TABLET BY MOUTH DAILY  
  
 KENDALL'S BABY SHAMPOO EX  
by Apply Externally route. latanoprost 0.005 % ophthalmic solution Commonly known as:  XALATAN  
  
 lisinopril 40 mg tablet Commonly known as:  PRINIVIL, ZESTRIL  
TAKE 1 TABLET BY MOUTH DAILY  
  
 LUMIGAN 0.03 % ophthalmic drops Generic drug:  bimatoprost  
Administer 1 Drop to both eyes every evening. metoprolol succinate 50 mg XL tablet Commonly known as:  TOPROL-XL  
TAKE ONE TABLET BY MOUTH AT BEDTIME  
  
 NAMENDA XR 28 mg capsule Generic drug:  memantine ER  
  
 PATANOL 0.1 % ophthalmic solution Generic drug:  olopatadine Administer 2 Drops to both eyes two (2) times a day. * PHENobarbital 60 mg tablet Commonly known as:  LUMINAL  
 Take 2 Tabs by mouth nightly. Max Daily Amount: 120 mg.  
  
 * PHENobarbital 64.8 mg tablet Commonly known as:  LUMINAL  
  
 potassium chloride SR 10 mEq tablet Commonly known as:  KLOR-CON 10  
TAKE 1 TABLET BY MOUTH DAILY  
  
 XANAX 0.25 mg tablet Generic drug:  ALPRAZolam  
Take 0.125 mg by mouth two (2) times a day. ZyPREXA 10 mg tablet Generic drug:  OLANZapine Take 10 mg by mouth nightly. * Notice: This list has 2 medication(s) that are the same as other medications prescribed for you. Read the directions carefully, and ask your doctor or other care provider to review them with you. We Performed the Following CBC W/O DIFF [44651 CPT(R)] HEMOGLOBIN A1C WITH EAG [32022 CPT(R)] LIPID PANEL [97952 CPT(R)] METABOLIC PANEL, COMPREHENSIVE [94991 CPT(R)] QUANTIFERON TB GOLD [[id number] Custom] Follow-up Instructions Return in about 6 months (around 4/20/2018). Introducing [facility]! [facility] introduces Paper[employer] patient portal. Now you can access parts of your medical record, email your doctor's office, and request medication refills online. 1. In your internet browser, go to [email or web address] 2. Click on the First Time User? Click Here link in the Sign In box. You will see the New Member Sign Up page. 3. Enter your Paper[employer] Access Code exactly as it appears below. You will not need to use this code after youve completed the sign-up process. If you do not sign up before the expiration date, you must request a new code. · Paper[employer] Access Code: [email or web address] Expires: 1/18/2018  2:06 PM 
 
4. Enter the last four digits of your Social Security Number (xxxx) and Date of Birth (mm/dd/yyyy) as indicated and click Submit. You will be taken to the next sign-up page. 5. Create a Paper[employer] ID. This will be your [employer] login ID and cannot be changed, so think of one that is secure and easy to remember. 6. Create a Libertad[employer] password. You can change your password at any time. 7. Enter your Password Reset Question and Answer. This can be used at a later time if you forget your password. 8. Enter your e-mail address. You will receive e-mail notification when new information is available in [address] 9. Click Sign Up. You can now view and download portions of your medical record. 10. Click the Download Summary menu link to download a portable copy of your medical information. If you have questions, please visit the Frequently Asked Questions section of the [employer] website. Remember, [employer]Card is NOT to be used for urgent needs. For medical emergencies, dial 911. Now available from your iPhone and Android! Please provide this summary of care documentation to your next provider. Your primary care clinician is listed as Claire Saravia. If you have any questions after today's visit, please call [phone].

## 2017-10-20 NOTE — PROGRESS NOTES
HISTORY OF PRESENT ILLNESS  Heidi Bauman is a 60 y.o. male. Pt. comes in with his  for f/u. He is followed by [address] [place] [address].  Needs an annual physical form filled. Also needs labs and a TB test.  Has multiple medical problems. His BP is stable on meds. No recent seizures. Memory is poor. Followed by psychiatry for bipolar disorder. Medications help. Reports compliance with medications but not his diet. Med list and most recent labs/studies reviewed with pt. A1c was 5.7. Has hyperlipidemia as well. Denies smoking or alcohol use l. No allergies. Trying to be active physically as tolerated. Denies tobacco and alcohol use. No family in [address] [place] [address] Needs repeat labs. Reports no other new c/o. Hypertension    Pertinent negatives include no chest pain, no headaches, no dizziness and no shortness of breath. Cholesterol Problem   Pertinent negatives include no chest pain, no abdominal pain, no headaches and no shortness of breath. Arthritis   Pertinent negatives include no chest pain, no abdominal pain, no headaches and no shortness of breath. Review of Systems   Constitutional: Negative. Eyes: Negative. Respiratory: Negative for shortness of breath. Cardiovascular: Negative for chest pain and leg swelling. Gastrointestinal: Negative for abdominal pain and heartburn. Genitourinary: Negative for dysuria and frequency. Musculoskeletal: Negative for back pain, falls and joint pain. Skin: Negative for rash. Neurological: Positive for seizures. Negative for dizziness, sensory change and headaches. Psychiatric/Behavioral: Positive for memory loss. Negative for depression. The patient is nervous/anxious. The patient does not have insomnia. All other systems reviewed and are negative. Physical Exam   Constitutional: He appears well-developed and well-nourished. No distress. obese   HENT:   Head: Normocephalic and atraumatic.    Mouth/Throat: Oropharynx is clear and moist.   Eyes: Conjunctivae are normal. No scleral icterus. Poor vision   Neck: Normal range of motion. Neck supple. No JVD present. No thyromegaly present. Cardiovascular: Normal rate, regular rhythm, normal heart sounds and intact distal pulses. No murmur heard. Pulmonary/Chest: Effort normal and breath sounds normal. No respiratory distress. He has no wheezes. He has no rales. Abdominal: Soft. Bowel sounds are normal. He exhibits no distension. There is no tenderness. obese   Musculoskeletal: He exhibits no edema or tenderness. Neurological: He is alert. No cranial nerve deficit. Coordination normal.   A+O to name, Wes, not date   Doesn't know president's name   Skin: Skin is warm and dry. No rash noted. Psychiatric: He has a normal mood and affect. His behavior is normal.   Slow speech  Chronic cognitive deficit   Nursing note and vitals reviewed. ASSESSMENT and PLAN  Diagnoses and all orders for this visit:    1. Essential hypertension  -     METABOLIC PANEL, COMPREHENSIVE  -     CBC W/O DIFF    2. Prediabetes    3. Mixed hyperlipidemia  -     LIPID PANEL    4. Hyperglycemia  -     HEMOGLOBIN A1C WITH EAG    5. Memory impairment    6. Seizure disorder ([facility] [address]    7. Bipolar disorder in full remission, most recent episode unspecified type ([address]    8. Intellectual disability    9. Screening-pulmonary TB  -     QUANTIFERON TB GOLD      Follow-up Disposition:  Return in about 6 months (around 4/20/2018).    lab results and schedule of future lab studies reviewed with patient  reviewed diet, exercise and weight control  reviewed medications and side effects in detail  F/u with other MD's as scheduled  Annual physical form filled and faxed  Overall stable

## 2017-10-20 NOTE — LETTER
12/7/2017 1:06 PM 
 
Mr. Jeane Adair [address] [id number] Dear Jeane Garcia: 
 
Please find your most recent results below. Resulted Orders LIPID PANEL Result Value Ref Range Cholesterol, total 206 (H) 100 - 199 mg/dL Triglyceride 143 0 - 149 mg/dL HDL Cholesterol 36 (L) >39 mg/dL VLDL, calculated 29 5 - 40 mg/dL LDL, calculated 141 (H) 0 - 99 mg/dL Narrative Performed at:  [address]  [id number] : Dee Guerrero MD, Phone:  [phone] METABOLIC PANEL, COMPREHENSIVE Result Value Ref Range Glucose 97 65 - 99 mg/dL BUN 11 6 - 24 mg/dL Creatinine 1.02 0.76 - 1.27 mg/dL GFR est non-AA 80 >59 mL/min/1.73 GFR est AA 93 >59 mL/min/1.73  
 BUN/Creatinine ratio 11 9 - 20 Sodium 135 134 - 144 mmol/L Potassium 4.0 3.5 - 5.2 mmol/L Chloride 97 96 - 106 mmol/L  
 CO2 27 18 - 29 mmol/L Calcium 8.7 8.7 - 10.2 mg/dL Protein, total 6.8 6.0 - 8.5 g/dL Albumin 3.9 3.5 - 5.5 g/dL GLOBULIN, TOTAL 2.9 1.5 - 4.5 g/dL A-G Ratio 1.3 1.2 - 2.2 Bilirubin, total 0.3 0.0 - 1.2 mg/dL Alk. phosphatase 63 39 - 117 IU/L  
 AST (SGOT) 21 0 - 40 IU/L  
 ALT (SGPT) 15 0 - 44 IU/L Narrative Performed at:  [address]  [id number] : Dee Guerrero MD, Phone:  [phone] CBC W/O DIFF Result Value Ref Range WBC 7.7 3.4 - 10.8 x10E3/uL  
 RBC 4.15 4.14 - 5.80 x10E6/uL HGB 13.3 13.0 - 17.7 g/dL Comment: **Please note reference interval change** HCT 38.5 37.5 - 51.0 % MCV 93 79 - 97 fL  
 MCH 32.0 26.6 - 33.0 pg  
 MCHC 34.5 31.5 - 35.7 g/dL  
 RDW 13.3 12.3 - 15.4 % PLATELET 754 394 - 290 x10E3/uL Narrative Performed at:  [address]  [id number] : Dee Guerrero MD, Phone:  [phone] HEMOGLOBIN A1C WITH EAG Result Value Ref Range Hemoglobin A1c 5.3 4.8 - 5.6 % Comment:  
            Pre-diabetes: 5.7 - 6.4 Diabetes: >6.4 Glycemic control for adults with diabetes: <7.0 Estimated average glucose 105 mg/dL Narrative Performed at:  [address]  [id number] : Sotero Oshea MD, Phone:  [phone] QUANTIFERON TB GOLD Result Value Ref Range QuantiFERON Incubation Incubated, specimen forwarded to [place], [place] for 
completion of the assay. Narrative Performed at:  [address]  [id number] : Sotero Oshea MD, Phone:  [phone] CVD REPORT Result Value Ref Range INTERPRETATION Note Comment:  
   Supplemental report is available. Narrative Performed at:  [address] [place] [address]  [id number] : Marissa Harris PhD, Phone:  [phone] QUANTIFERON IN TUBE REFL Result Value Ref Range QuantiFERON TB Gold Negative Negative QUANTIFERON CRITERIA Comment Comment: To be considered positive a specimen should have a TB Ag minus Nil 
value greater than or equal to 0.35 IU/mL and in addition the TB Ag 
minus Nil value must be greater than or equal to 25% of the Nil 
value. There may be insufficient information in these values to 
differentiate between some negative and some indeterminate test 
values. QuantiFERON TB Ag Value 0.02 IU/mL QuantiFERON Nil Value 0.04 IU/mL QuantiFERON Mitogen Value >10.00 IU/mL  
 QFT TB Ag minus Nil Value <0.00 IU/mL Interpretation: Comment Comment:  
   The QuantiFERON TB Gold (in Tube) assay is intended for use as an aid 
in the diagnosis of TB infection. Negative results suggest that there 
is no TB infection.  In patients with high suspicion of exposure, a 
 negative test should be repeated. A positive test indicates infection 
with Mycobacterium tuberculosis. Among individuals without 
tuberculosis infection, a positive test may be due to exposure to 
[place], M. szsolegai or M. marinum. On the Internet, go to 
cdc.gov/tb for further details. Narrative Performed at:  [address]  [id number] : Adali Smith MD, Phone:  [phone] RECOMMENDATIONS: 
Negative TB Please call me if you have any questions: [phone] Sincerely, 
 
 
Julian Patterson, DO

## 2017-10-27 RX ORDER — CLONIDINE HYDROCHLORIDE 0.3 MG/1
TABLET ORAL
Qty: 62 TAB | Status: SHIPPED | OUTPATIENT
Start: 2017-10-27 | End: 2018-10-29 | Stop reason: SDUPTHER

## 2017-12-04 ENCOUNTER — HOSPITAL ENCOUNTER (OUTPATIENT)
Dept: LAB | Age: 57
Discharge: HOME | End: 2017-12-04
Payer: MEDICARE

## 2017-12-04 PROCEDURE — 85027 COMPLETE CBC AUTOMATED: CPT

## 2017-12-04 PROCEDURE — 86480 TB TEST CELL IMMUN MEASURE: CPT

## 2017-12-04 PROCEDURE — 80053 COMPREHEN METABOLIC PANEL: CPT

## 2017-12-04 PROCEDURE — 83036 HEMOGLOBIN GLYCOSYLATED A1C: CPT

## 2017-12-04 PROCEDURE — 36415 COLL VENOUS BLD VENIPUNCTURE: CPT

## 2017-12-04 PROCEDURE — 80061 LIPID PANEL: CPT

## 2017-12-05 LAB
ALBUMIN SERPL-MCNC: 3.9 G/DL (ref 3.5–5.5)
ALBUMIN/GLOB SERPL: 1.3 {RATIO} (ref 1.2–2.2)
ALP SERPL-CCNC: 63 IU/L (ref 39–117)
ALT SERPL-CCNC: 15 IU/L (ref 0–44)
AST SERPL-CCNC: 21 IU/L (ref 0–40)
BILIRUB SERPL-MCNC: 0.3 MG/DL (ref 0–1.2)
BUN SERPL-MCNC: 11 MG/DL (ref 6–24)
BUN/CREAT SERPL: 11 (ref 9–20)
CALCIUM SERPL-MCNC: 8.7 MG/DL (ref 8.7–10.2)
CHLORIDE SERPL-SCNC: 97 MMOL/L (ref 96–106)
CHOLEST SERPL-MCNC: 206 MG/DL (ref 100–199)
CO2 SERPL-SCNC: 27 MMOL/L (ref 18–29)
CREAT SERPL-MCNC: 1.02 MG/DL (ref 0.76–1.27)
ERYTHROCYTE [DISTWIDTH] IN BLOOD BY AUTOMATED COUNT: 13.3 % (ref 12.3–15.4)
EST. AVERAGE GLUCOSE BLD GHB EST-MCNC: 105 MG/DL
GFR SERPLBLD CREATININE-BSD FMLA CKD-EPI: 80 ML/MIN/1.73
GFR SERPLBLD CREATININE-BSD FMLA CKD-EPI: 93 ML/MIN/1.73
GLOBULIN SER CALC-MCNC: 2.9 G/DL (ref 1.5–4.5)
GLUCOSE SERPL-MCNC: 97 MG/DL (ref 65–99)
HBA1C MFR BLD: 5.3 % (ref 4.8–5.6)
HCT VFR BLD AUTO: 38.5 % (ref 37.5–51)
HDLC SERPL-MCNC: 36 MG/DL
HGB BLD-MCNC: 13.3 G/DL (ref 13–17.7)
INTERPRETATION, 910389: NORMAL
LDLC SERPL CALC-MCNC: 141 MG/DL (ref 0–99)
MCH RBC QN AUTO: 32 PG (ref 26.6–33)
MCHC RBC AUTO-ENTMCNC: 34.5 G/DL (ref 31.5–35.7)
MCV RBC AUTO: 93 FL (ref 79–97)
PLATELET # BLD AUTO: 259 X10E3/UL (ref 150–379)
POTASSIUM SERPL-SCNC: 4 MMOL/L (ref 3.5–5.2)
PROT SERPL-MCNC: 6.8 G/DL (ref 6–8.5)
RBC # BLD AUTO: 4.15 X10E6/UL (ref 4.14–5.8)
SODIUM SERPL-SCNC: 135 MMOL/L (ref 134–144)
TRIGL SERPL-MCNC: 143 MG/DL (ref 0–149)
VLDLC SERPL CALC-MCNC: 29 MG/DL (ref 5–40)
WBC # BLD AUTO: 7.7 X10E3/UL (ref 3.4–10.8)

## 2017-12-06 LAB
ANNOTATION COMMENT IMP: NORMAL
GAMMA INTERFERON BACKGROUND BLD IA-ACNC: 0.04 IU/ML
M TB IFN-G BLD-IMP: NEGATIVE
M TB IFN-G CD4+ BCKGRND COR BLD-ACNC: <0 IU/ML
M TB IFN-G CD4+ T-CELLS BLD-ACNC: 0.02 IU/ML
MITOGEN IGNF BLD-ACNC: >10 IU/ML
QUANTIFERON INCUBATION: NORMAL
SERVICE CMNT-IMP: NORMAL

## 2018-02-07 RX ORDER — METOPROLOL SUCCINATE 50 MG/1
TABLET, EXTENDED RELEASE ORAL
Qty: 31 TAB | Status: SHIPPED | OUTPATIENT
Start: 2018-02-07 | End: 2019-01-30 | Stop reason: SDUPTHER

## 2018-03-05 RX ORDER — POTASSIUM CHLORIDE 750 MG/1
TABLET, FILM COATED, EXTENDED RELEASE ORAL
Qty: 28 TAB | Status: SHIPPED | OUTPATIENT
Start: 2018-03-05 | End: 2019-02-28 | Stop reason: SDUPTHER

## 2018-03-09 ENCOUNTER — OFFICE VISIT (OUTPATIENT)
Dept: INTERNAL MEDICINE CLINIC | Age: 58
End: 2018-03-09

## 2018-03-09 VITALS
BODY MASS INDEX: 32.43 KG/M2 | SYSTOLIC BLOOD PRESSURE: 142 MMHG | HEIGHT: 68 IN | WEIGHT: 214 LBS | HEART RATE: 66 BPM | OXYGEN SATURATION: 98 % | DIASTOLIC BLOOD PRESSURE: 87 MMHG | RESPIRATION RATE: 19 BRPM | TEMPERATURE: 98.7 F

## 2018-03-09 DIAGNOSIS — J01.10 ACUTE NON-RECURRENT FRONTAL SINUSITIS: Primary | ICD-10-CM

## 2018-03-09 RX ORDER — AMOXICILLIN AND CLAVULANATE POTASSIUM 875; 125 MG/1; MG/1
1 TABLET, FILM COATED ORAL 2 TIMES DAILY
Qty: 20 TAB | Refills: 0 | Status: SHIPPED | OUTPATIENT
Start: 2018-03-09 | End: 2018-03-19

## 2018-03-09 RX ORDER — PROMETHAZINE HYDROCHLORIDE AND DEXTROMETHORPHAN HYDROBROMIDE 6.25; 15 MG/5ML; MG/5ML
5 SYRUP ORAL
Qty: 115 ML | Refills: 0 | Status: SHIPPED | OUTPATIENT
Start: 2018-03-09 | End: 2018-03-16

## 2018-03-09 NOTE — PROGRESS NOTES
Subjective:     Chief Complaint   Patient presents with    Cold Symptoms     productive cough, nasal congestion, sore throat    Ear Fullness    Hypertension    Cholesterol Problem       History of Present Illness    Reji Us is a 59y.o. year old male who is a patient of Dr. Albertina Yen that presents today with complaints of sinus pain, pressure, cough and nasal congestion for 4 days. He denies any fever. He arrives with he  form residential home. He has not taken anyting for his symptoms. His PMH includes HTN, seizure disorder, demential, MR, DOR[place], obesity, and hyperlipidemia. He denies any other new complaints at this time. No CP, SOB, GI, or  symptoms. Reviewed medications, recent lab work and imaging with patient. Pt reports compliance with medications. Current Outpatient Prescriptions on File Prior to Visit   Medication Sig Dispense Refill    potassium chloride SR (KLOR-CON 10) 10 mEq tablet TAKE 1 TABLET BY MOUTH DAILY 28 Tab PRN    metoprolol succinate (TOPROL-XL) 50 mg XL tablet TAKE ONE TABLET BY MOUTH AT BEDTIME 31 Tab PRN    cloNIDine HCl (CATAPRES) 0.3 mg tablet TAKE 1 TABLET BY MOUTH TWICE A DAY 62 Tab PRN    erythromycin (ILOTYCIN) ophthalmic ointment Administer  to both eyes nightly.  hydrALAZINE (APRESOLINE) 100 mg tablet TAKE 1 TABLET BY MOUTH 3 times daily 93 Tab 11    amLODIPine (NORVASC) 10 mg tablet TAKE 1 TABLET BY MOUTH EVERY MORNING 31 Tab 11    lisinopril (PRINIVIL, ZESTRIL) 40 mg tablet TAKE 1 TABLET BY MOUTH DAILY 30 Tab PRN    indapamide (LOZOL) 1.25 mg tablet TAKE 1 TABLET BY MOUTH DAILY 31 Tab PRN    SOAP (KENDALL'S BABY SHAMPOO EX) by Apply Externally route.  PHENobarbital (LUMINAL) 64.8 mg tablet       latanoprost (XALATAN) 0.005 % ophthalmic solution       NAMENDA XR 28 mg capsule       PHENobarbital (LUMINAL) 60 mg tablet Take 2 Tabs by mouth nightly.  Max Daily Amount: 120 mg. 60 Tab 5    ketotifen (ALAWAY) 0.025 % ophthalmic solution Administer 1 Drop to both eyes two (2) times a day.  ammonium lactate (LAC-HYDRIN) 12 % lotion       olanzapine (ZYPREXA) 10 mg tablet Take 10 mg by mouth nightly.  bimatoprost (LUMIGAN) 0.03 % ophthalmic drops Administer 1 Drop to both eyes every evening.  olopatadine (PATANOL) 0.1 % ophthalmic solution Administer 2 Drops to both eyes two (2) times a day.  alprazolam (XANAX) 0.25 mg tablet Take 0.125 mg by mouth two (2) times a day.  busPIRone (BUSPAR) 15 mg tablet Take 15 mg by mouth two (2) times a day. No current facility-administered medications on file prior to visit. 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 No pertinent surgical history. Social History   Substance Use Topics    Smoking status: Never Smoker    Smokeless tobacco: Never Used    Alcohol use No      History reviewed. No pertinent family history. Objective:     Vitals:    03/09/18 1450   BP: 142/87   Pulse: 66   Resp: 19   Temp: 98.7 °F (37.1 °C)   TempSrc: Oral   SpO2: 98%   Weight: 214 lb (97.1 kg)   Height: 5' 8\" (1.727 m)       Review of Systems   Constitutional: Negative. HENT: Positive for congestion, ear pain and sinus pain. Eyes: Negative. Respiratory: Positive for cough. Cardiovascular: Positive for PND. Gastrointestinal: Negative. Genitourinary: Negative. Musculoskeletal: Negative. Skin: Negative. Neurological: Negative. Psychiatric/Behavioral: Negative. Physical Exam   Constitutional: He appears well-developed and well-nourished.  No distress. Well-appearing AA male. NAD   Intellectual delay   HENT:   Head: Normocephalic and atraumatic. Right Ear: A middle ear effusion is present. Left Ear: A middle ear effusion is present. Nose: Mucosal edema and rhinorrhea present. Right sinus exhibits frontal sinus tenderness. Left sinus exhibits frontal sinus tenderness. Mouth/Throat: Posterior oropharyngeal erythema present. Eyes: Conjunctivae are normal. No scleral icterus. Poor vision   Neck: Normal range of motion. Neck supple. No JVD present. No thyromegaly present. Cardiovascular: Normal rate, regular rhythm, normal heart sounds and intact distal pulses. No murmur heard. Pulmonary/Chest: Effort normal and breath sounds normal. No respiratory distress. He has no wheezes. He has no rales. Abdominal: Soft. Bowel sounds are normal. He exhibits no distension. There is no tenderness. Musculoskeletal: Normal range of motion. He exhibits no edema, tenderness or deformity. Neurological: He is alert. No cranial nerve deficit. Coordination normal.   A+O to name, Wes, not date   Doesn't know president's name   Skin: Skin is warm and dry. No rash noted. He is not diaphoretic. Psychiatric: He has a normal mood and affect. His behavior is normal.   Slow speech  Chronic cognitive deficit   Nursing note and vitals reviewed. Assessment/ Plan:   Diagnoses and all orders for this visit:    1. Acute non-recurrent frontal sinusitis   Will order  -     amoxicillin-clavulanate (AUGMENTIN) 875-125 mg per tablet; Take 1 Tab by mouth two (2) times a day for 10 days. Indications: ACUTE BACTERIAL SINUSITIS  -     promethazine-dextromethorphan (PROMETHAZINE-DM) 6.25-15 mg/5 mL syrup; Take 5 mL by mouth every four (4) hours as needed for Cough for up to 7 days. Patient's plan of care has been reviewed with them.   Patient and/or family have verbally conveyed their understanding and agreement of the patient's signs, symptoms, diagnosis, treatment and prognosis and additionally agree to follow up as recommended or return to [facility] should their condition change prior to follow-up. Discharge instructions have also been provided to the patient with some educational information regarding their diagnosis as well a list of reasons why they would want to return to the office prior to their follow-up appointment should their condition change. Follow-up with Dr. Christal Guo as scheduled.

## 2018-03-09 NOTE — PATIENT INSTRUCTIONS
Saline Nasal Washes: Care Instructions  Your Care Instructions  Saline nasal washes help keep the nasal passages open by washing out thick or dried mucus. This simple remedy can help relieve symptoms of allergies, sinusitis, and colds. It also can make the nose feel more comfortable by keeping the mucous membranes moist. You may notice a little burning sensation in your nose the first few times you use the solution, but this usually gets better in a few days. Follow-up care is a key part of your treatment and safety. Be sure to make and go to all appointments, and call your doctor if you are having problems. It's also a good idea to know your test results and keep a list of the medicines you take. How can you care for yourself at home? · You can buy premixed saline solution in a squeeze bottle or other sinus rinse products at a drugstore. Read and follow the instructions on the label. · You also can make your own saline solution by adding 1 teaspoon of salt and 1 teaspoon of baking soda to 2 cups of distilled water. · If you use a homemade solution, pour a small amount into a clean bowl. Using a rubber bulb syringe, squeeze the syringe and place the tip in the salt water. Pull a small amount of the salt water into the syringe by relaxing your hand. · Sit down with your head tilted slightly back. Do not lie down. Put the tip of the bulb syringe or the squeeze bottle a little way into one of your nostrils. Gently drip or squirt a few drops into the nostril. Repeat with the other nostril. Some sneezing and gagging are normal at first.  · Gently blow your nose. · Wipe the syringe or bottle tip clean after each use. · Repeat this 2 or 3 times a day. · Use nasal washes gently if you have nosebleeds often. When should you call for help? Watch closely for changes in your health, and be sure to contact your doctor if:  ? · You often get nosebleeds. ? · You have problems doing the nasal washes.    Where can you learn more? Go to http://jeanette-alyssia.info/. Enter 071 [id number] [id number] in the search box to learn more about \"Saline Nasal Washes: Care Instructions. \"  Current as of: May 12, 2017  Content Version: 11.4  © 7322-4614 [employer]. Care instructions adapted under license by [employer] (which disclaims liability or warranty for this information). If you have questions about a medical condition or this instruction, always ask your healthcare professional. [address] any warranty or liability for your use of this information. Sinusitis: Care Instructions  Your Care Instructions    Sinusitis is an infection of the lining of the sinus cavities in your head. Sinusitis often follows a cold. It causes pain and pressure in your head and face. In most cases, sinusitis gets better on its own in 1 to 2 weeks. But some mild symptoms may last for several weeks. Sometimes antibiotics are needed. Follow-up care is a key part of your treatment and safety. Be sure to make and go to all appointments, and call your doctor if you are having problems. It's also a good idea to know your test results and keep a list of the medicines you take. How can you care for yourself at home? · Take an over-the-counter pain medicine, such as acetaminophen (Tylenol), ibuprofen (Advil, Motrin), or naproxen (Aleve). Read and follow all instructions on the label. · If the doctor prescribed antibiotics, take them as directed. Do not stop taking them just because you feel better. You need to take the full course of antibiotics. · Be careful when taking over-the-counter cold or flu medicines and Tylenol at the same time. Many of these medicines have acetaminophen, which is Tylenol. Read the labels to make sure that you are not taking more than the recommended dose. Too much acetaminophen (Tylenol) can be harmful.   · Breathe warm, moist air from a steamy shower, a hot bath, or a sink filled with hot water. Avoid cold, dry air. Using a humidifier in your home may help. Follow the directions for cleaning the machine. · Use saline (saltwater) nasal washes to help keep your nasal passages open and wash out mucus and bacteria. You can buy saline nose drops at a grocery store or drugstore. Or you can make your own at home by adding 1 teaspoon of salt and 1 teaspoon of baking soda to 2 cups of distilled water. If you make your own, fill a bulb syringe with the solution, insert the tip into your nostril, and squeeze gently. Todd Klemme your nose. · Put a hot, wet towel or a warm gel pack on your face 3 or 4 times a day for 5 to 10 minutes each time. · Try a decongestant nasal spray like oxymetazoline (Afrin). Do not use it for more than 3 days in a row. Using it for more than 3 days can make your congestion worse. When should you call for help? Call your doctor now or seek immediate medical care if:  ? · You have new or worse swelling or redness in your face or around your eyes. ? · You have a new or higher fever. ? Watch closely for changes in your health, and be sure to contact your doctor if:  ? · You have new or worse facial pain. ? · The mucus from your nose becomes thicker (like pus) or has new blood in it. ? · You are not getting better as expected. Where can you learn more? Go to http://jeanette-alyssia.info/. Enter Z661 in the search box to learn more about \"Sinusitis: Care Instructions. \"  Current as of: May 12, 2017  Content Version: 11.4  © 0746-8270 [employer]. Care instructions adapted under license by [employer] (which disclaims liability or warranty for this information). If you have questions about a medical condition or this instruction, always ask your healthcare professional. [address] any warranty or liability for your use of this information.

## 2018-03-09 NOTE — MR AVS SNAPSHOT
[address] [facility] [address] [place] [address] 
[phone] Patient: Saba August MRN: [id number] Visit Information Date & Time Provider Department Dept. Phone Encounter #  
 3/9/2018  2:00 PM Mercedez Carmichael, Laura [facility] Internal Medicine [phone] [id number] Your Appointments 4/20/2018  2:30 PM  
ROUTINE CARE with Silverio Christy DO [facility] Internal Medicine ([address] [facility]) Appt Note: follow up 4m; 6 month follow up [address] [facility] [address] 1400 [facility] [address]  
[phone]  
  
   
 [address] Upcoming Health Maintenance Date Due FOBT Q 1 YEAR AGE 50-75 4/7/2017 MEDICARE YEARLY EXAM 7/8/2018 DTaP/Tdap/Td series (2 - Td) 5/20/2026 Allergies as of 3/9/2018  Review Complete On: 3/9/2018 By: Mercedez Carmichael NP No Known Allergies Current Immunizations  Reviewed on 12/6/2016 Name Date Influenza Nasal Vaccine 10/15/2016 Not reviewed this visit You Were Diagnosed With   
  
 Codes Comments Acute non-recurrent frontal sinusitis    -  Primary ICD-10-CM: J01.10 ICD-9-CM: 857.1 Vitals BP Pulse Temp Resp Height(growth percentile) Weight(growth percentile) 142/87 (BP 1 Location: Right arm, BP Patient Position: Sitting) 66 98.7 °F (37.1 °C) (Oral) 19 5' 8\" (1.727 m) 214 lb (97.1 kg) SpO2 BMI Smoking Status 98% 32.54 kg/m2 Never Smoker Vitals History BMI and BSA Data Body Mass Index Body Surface Area 32.54 kg/m 2 2.16 m 2 Preferred Pharmacy Pharmacy Name Phone [facility]rera[address], [phone] Your Updated Medication List  
  
   
This list is accurate as of 3/9/18  3:05 PM.  Always use your most recent med list.  
  
  
  
  
 ALAWAY 0.025 % (0.035 %) ophthalmic solution Generic drug:  ketotifen Administer 1 Drop to both eyes two (2) times a day. amLODIPine 10 mg tablet Commonly known as:  [place] Boozer TAKE 1 TABLET BY MOUTH EVERY MORNING  
  
 ammonium lactate 12 % lotion Commonly known as:  LAC-HYDRIN  
  
 amoxicillin-clavulanate 875-125 mg per tablet Commonly known as:  AUGMENTIN Take 1 Tab by mouth two (2) times a day for 10 days. Indications: ACUTE BACTERIAL SINUSITIS  
  
 BUSPAR 15 mg tablet Generic drug:  busPIRone Take 15 mg by mouth two (2) times a day. cloNIDine HCl 0.3 mg tablet Commonly known as:  CATAPRES  
TAKE 1 TABLET BY MOUTH TWICE A DAY  
  
 erythromycin ophthalmic ointment Commonly known as:  ILOTYCIN Administer  to both eyes nightly. hydrALAZINE 100 mg tablet Commonly known as:  APRESOLINE  
TAKE 1 TABLET BY MOUTH 3 times daily  
  
 indapamide 1.25 mg tablet Commonly known as:  LOZOL  
TAKE 1 TABLET BY MOUTH DAILY  
  
 KENDALL'S BABY SHAMPOO EX  
by Apply Externally route. latanoprost 0.005 % ophthalmic solution Commonly known as:  XALATAN  
  
 lisinopril 40 mg tablet Commonly known as:  PRINIVIL, ZESTRIL  
TAKE 1 TABLET BY MOUTH DAILY  
  
 LUMIGAN 0.03 % ophthalmic drops Generic drug:  bimatoprost  
Administer 1 Drop to both eyes every evening. metoprolol succinate 50 mg XL tablet Commonly known as:  TOPROL-XL  
TAKE ONE TABLET BY MOUTH AT BEDTIME  
  
 NAMENDA XR 28 mg capsule Generic drug:  memantine ER  
  
 PATANOL 0.1 % ophthalmic solution Generic drug:  olopatadine Administer 2 Drops to both eyes two (2) times a day. * PHENobarbital 60 mg tablet Commonly known as:  LUMINAL Take 2 Tabs by mouth nightly. Max Daily Amount: 120 mg.  
  
 * PHENobarbital 64.8 mg tablet Commonly known as:  LUMINAL  
  
 potassium chloride SR 10 mEq tablet Commonly known as:  KLOR-CON 10  
TAKE 1 TABLET BY MOUTH DAILY promethazine-dextromethorphan 6.25-15 mg/5 mL syrup Commonly known as:  PROMETHAZINE-DM  
 Take 5 mL by mouth every four (4) hours as needed for Cough for up to 7 days. XANAX 0.25 mg tablet Generic drug:  ALPRAZolam  
Take 0.125 mg by mouth two (2) times a day. ZyPREXA 10 mg tablet Generic drug:  OLANZapine Take 10 mg by mouth nightly. * Notice: This list has 2 medication(s) that are the same as other medications prescribed for you. Read the directions carefully, and ask your doctor or other care provider to review them with you. Prescriptions Sent to Pharmacy Refills  
 amoxicillin-clavulanate (AUGMENTIN) 875-125 mg per tablet 0 Sig: Take 1 Tab by mouth two (2) times a day for 10 days. Indications: ACUTE BACTERIAL SINUSITIS Class: Normal  
 Pharmacy: [address] [place] [address] #: [phone] Route: Oral  
 promethazine-dextromethorphan (PROMETHAZINE-DM) 6.25-15 mg/5 mL syrup 0 Sig: Take 5 mL by mouth every four (4) hours as needed for Cough for up to 7 days. Class: Normal  
 Pharmacy: [address] [place] [address] Ph #: [phone] Route: Oral  
  
Introducing [facility]! [facility] introduces Everwise patient portal. Now you can access parts of your medical record, email your doctor's office, and request medication refills online. 1. In your internet browser, go to [email or web address] 2. Click on the First Time User? Click Here link in the Sign In box. You will see the New Member Sign Up page. 3. Enter your Everwise Access Code exactly as it appears below. You will not need to use this code after youve completed the sign-up process. If you do not sign up before the expiration date, you must request a new code. · Everwise Access Code: [email or web address] Expires: 6/7/2018  1:54 PM 
 
4. Enter the last four digits of your Social Security Number (xxxx) and Date of Birth (mm/dd/yyyy) as indicated and click Submit.  You will be taken to the next sign-up page. 5. Create a Instagarage ID. This will be your Instagarage login ID and cannot be changed, so think of one that is secure and easy to remember. 6. Create a Instagarage password. You can change your password at any time. 7. Enter your Password Reset Question and Answer. This can be used at a later time if you forget your password. 8. Enter your e-mail address. You will receive e-mail notification when new information is available in [address] 9. Click Sign Up. You can now view and download portions of your medical record. 10. Click the Download Summary menu link to download a portable copy of your medical information. If you have questions, please visit the Frequently Asked Questions section of the Instagarage website. Remember, Instagarage is NOT to be used for urgent needs. For medical emergencies, dial 911. Now available from your iPhone and Android! Please provide this summary of care documentation to your next provider. Your primary care clinician is listed as Brian Nelson. If you have any questions after today's visit, please call [phone].

## 2018-04-20 ENCOUNTER — OFFICE VISIT (OUTPATIENT)
Dept: INTERNAL MEDICINE CLINIC | Age: 58
End: 2018-04-20

## 2018-04-20 VITALS
HEIGHT: 68 IN | HEART RATE: 58 BPM | WEIGHT: 209 LBS | BODY MASS INDEX: 31.67 KG/M2 | DIASTOLIC BLOOD PRESSURE: 88 MMHG | SYSTOLIC BLOOD PRESSURE: 135 MMHG | OXYGEN SATURATION: 98 % | RESPIRATION RATE: 19 BRPM

## 2018-04-20 DIAGNOSIS — R13.14 PHARYNGOESOPHAGEAL DYSPHAGIA: Primary | ICD-10-CM

## 2018-04-20 DIAGNOSIS — E66.9 OBESITY (BMI 30.0-34.9): ICD-10-CM

## 2018-04-20 DIAGNOSIS — I10 ESSENTIAL HYPERTENSION: ICD-10-CM

## 2018-04-20 DIAGNOSIS — F31.70 BIPOLAR DISORDER IN FULL REMISSION, MOST RECENT EPISODE UNSPECIFIED TYPE (HCC): ICD-10-CM

## 2018-04-20 RX ORDER — OMEPRAZOLE 20 MG/1
20 CAPSULE, DELAYED RELEASE ORAL DAILY
Qty: 30 CAP | Refills: 0 | Status: SHIPPED | OUTPATIENT
Start: 2018-04-20 | End: 2018-05-30 | Stop reason: SDUPTHER

## 2018-04-20 NOTE — PROGRESS NOTES
Health Maintenance Due   Topic Date Due    FOBT Q 1 YEAR AGE 50-75  04/07/2017       Chief Complaint   Patient presents with    Dysphagia    Hypertension    Cholesterol Problem    Follow Up Chronic Condition       1. Have you been to the ER, urgent care clinic since your last visit? Hospitalized since your last visit? No    2. Have you seen or consulted any other health care providers outside of the [address] [place] [address] since your last visit? Include any pap smears or colon screening. No    3) Do you have an Advance Directive on file? no    4) Are you interested in receiving information on Advance Directives? NO      Patient is accompanied by self I have received verbal consent from Kindra Mayers to discuss any/all medical information while they are present in the room.

## 2018-04-20 NOTE — PROGRESS NOTES
HISTORY OF PRESENT ILLNESS  Ernesto Litten is a 59 y.o. male. Pt. comes in with his  for f/u. Has multiple medical problems. Reports having difficulty swallowing with food getting stuck. It happens when he tries to eat fast.  His BP is stable on meds. No recent seizures. Memory is poor. Followed by psychiatry for bipolar disorder. Reports compliance with medications but not his diet. Med list and most recent labs/studies reviewed with pt. A1c was 5.3. Denies smoking or alcohol use l. No allergies. Trying to be active physically to control weight. Reports no other new c/o. Dysphagia   Pertinent negatives include no chest pain, no abdominal pain, no headaches and no shortness of breath. Hypertension    Pertinent negatives include no chest pain, no headaches, no dizziness, no shortness of breath, no nausea and no vomiting. Cholesterol Problem   Pertinent negatives include no chest pain, no abdominal pain, no headaches and no shortness of breath. Follow Up Chronic Condition   Pertinent negatives include no chest pain, no abdominal pain, no headaches and no shortness of breath. Review of Systems   Constitutional: Negative. Eyes: Negative. Respiratory: Negative for shortness of breath. Cardiovascular: Negative for chest pain and leg swelling. Gastrointestinal: Positive for heartburn. Negative for abdominal pain, nausea and vomiting. Genitourinary: Negative for dysuria and frequency. Musculoskeletal: Negative for back pain, falls and joint pain. Skin: Negative. Neurological: Positive for seizures. Negative for dizziness, sensory change and headaches. Psychiatric/Behavioral: Positive for memory loss. Negative for depression. The patient is nervous/anxious. The patient does not have insomnia. All other systems reviewed and are negative. Physical Exam   Constitutional: He appears well-developed and well-nourished. No distress.    obese   HENT:   Head: Normocephalic and atraumatic. Mouth/Throat: Oropharynx is clear and moist.   Eyes: Conjunctivae are normal.   Poor vision   Neck: Normal range of motion. Neck supple. No JVD present. No thyromegaly present. Cardiovascular: Normal rate, regular rhythm, normal heart sounds and intact distal pulses. No murmur heard. Pulmonary/Chest: Effort normal and breath sounds normal. No respiratory distress. He has no wheezes. He has no rales. Abdominal: Soft. Bowel sounds are normal. He exhibits no distension. There is no tenderness. obese   Musculoskeletal: He exhibits no edema or tenderness. Neurological: He is alert. Coordination normal.   A+O to name, Wes, not date   Doesn't know president's name   Skin: Skin is warm and dry. No rash noted. Psychiatric: He has a normal mood and affect. His behavior is normal.   Slow speech  Chronic cognitive deficit   Nursing note and vitals reviewed. ASSESSMENT and PLAN  Diagnoses and all orders for this visit:    1. Pharyngoesophageal dysphagia  -     XR BA SWALLOW ESOPHOGRAM; Future    2. Essential hypertension    3. Obesity (BMI 30.0-34.9)    4. Bipolar disorder in full remission, most recent episode unspecified type ([facility] [address]    Other orders  -     Trial of omeprazole (PRILOSEC) 20 mg capsule; Take 1 Cap by mouth daily. Follow-up Disposition:  Return in about 1 month (around 5/20/2018).    lab results and schedule of future lab studies reviewed with patient  reviewed diet, exercise and weight control  reviewed medications and side effects in detail  F/u with other MD's as scheduled  Advised pt to eat slow and chew well  Advised pt to drink more fluids

## 2018-04-20 NOTE — MR AVS SNAPSHOT
[address] [facility] [address] [place] [address] 
[phone] Patient: Thad Monae MRN: [id number] Visit Information Date & Time Provider Department Dept. Phone Encounter #  
 4/20/2018  2:30 PM Archie [facility] Internal Medicine [phone] [id number] Follow-up Instructions Return in about 1 month (around 5/20/2018). Upcoming Health Maintenance Date Due FOBT Q 1 YEAR AGE 50-75 4/7/2017 MEDICARE YEARLY EXAM 7/8/2018 DTaP/Tdap/Td series (2 - Td) 5/20/2026 Allergies as of 4/20/2018  Review Complete On: 4/20/2018 By: Reji Montenegro, DO No Known Allergies Current Immunizations  Reviewed on 12/6/2016 Name Date Influenza Nasal Vaccine 10/15/2016 Not reviewed this visit You Were Diagnosed With   
  
 Codes Comments Pharyngoesophageal dysphagia    -  Primary ICD-10-CM: R13.14 ICD-9-CM: 787.24 Essential hypertension     ICD-10-CM: I10 
ICD-9-CM: 401.9 Obesity (BMI 30.0-34.9)     ICD-10-CM: W67.1 ICD-9-CM: 278.00 Bipolar disorder in full remission, most recent episode unspecified type ([facility]     ICD-10-CM: F31.70 ICD-9-CM: 296.80 Vitals BP Pulse Resp Height(growth percentile) Weight(growth percentile) SpO2  
 135/88 (BP 1 Location: Right arm, BP Patient Position: Sitting) (!) 58 19 5' 8\" (1.727 m) 209 lb (94.8 kg) 98% BMI Smoking Status 31.78 kg/m2 Never Smoker Vitals History BMI and BSA Data Body Mass Index Body Surface Area 31.78 kg/m 2 2.13 m 2 Preferred Pharmacy Pharmacy Name Phone [facility]a Ramos [phone], [address] [phone] Your Updated Medication List  
  
   
This list is accurate as of 4/20/18  2:53 PM.  Always use your most recent med list.  
  
  
  
  
 ALAWAY 0.025 % (0.035 %) ophthalmic solution Generic drug:  ketotifen Administer 1 Drop to both eyes two (2) times a day. amLODIPine 10 mg tablet Commonly known as:  Sherwin Lute TAKE 1 TABLET BY MOUTH EVERY MORNING  
  
 ammonium lactate 12 % lotion Commonly known as:  LAC-HYDRIN  
  
 BUSPAR 15 mg tablet Generic drug:  busPIRone Take 15 mg by mouth two (2) times a day. cloNIDine HCl 0.3 mg tablet Commonly known as:  CATAPRES  
TAKE 1 TABLET BY MOUTH TWICE A DAY  
  
 erythromycin ophthalmic ointment Commonly known as:  ILOTYCIN Administer  to both eyes nightly. hydrALAZINE 100 mg tablet Commonly known as:  APRESOLINE  
TAKE 1 TABLET BY MOUTH 3 times daily  
  
 indapamide 1.25 mg tablet Commonly known as:  LOZOL  
TAKE 1 TABLET BY MOUTH DAILY  
  
 KENDALL'S BABY SHAMPOO EX  
by Apply Externally route. latanoprost 0.005 % ophthalmic solution Commonly known as:  XALATAN  
  
 lisinopril 40 mg tablet Commonly known as:  PRINIVIL, ZESTRIL  
TAKE 1 TABLET BY MOUTH DAILY  
  
 LUMIGAN 0.03 % ophthalmic drops Generic drug:  bimatoprost  
Administer 1 Drop to both eyes every evening. metoprolol succinate 50 mg XL tablet Commonly known as:  TOPROL-XL  
TAKE ONE TABLET BY MOUTH AT BEDTIME  
  
 NAMENDA XR 28 mg capsule Generic drug:  memantine ER  
  
 omeprazole 20 mg capsule Commonly known as:  PRILOSEC Take 1 Cap by mouth daily. PATANOL 0.1 % ophthalmic solution Generic drug:  olopatadine Administer 2 Drops to both eyes two (2) times a day. * PHENobarbital 60 mg tablet Commonly known as:  LUMINAL Take 2 Tabs by mouth nightly. Max Daily Amount: 120 mg.  
  
 * PHENobarbital 64.8 mg tablet Commonly known as:  LUMINAL  
  
 potassium chloride SR 10 mEq tablet Commonly known as:  KLOR-CON 10  
TAKE 1 TABLET BY MOUTH DAILY  
  
 XANAX 0.25 mg tablet Generic drug:  ALPRAZolam  
Take 0.125 mg by mouth two (2) times a day. ZyPREXA 10 mg tablet Generic drug:  OLANZapine Take 10 mg by mouth nightly. * Notice: This list has 2 medication(s) that are the same as other medications prescribed for you. Read the directions carefully, and ask your doctor or other care provider to review them with you. Prescriptions Sent to Pharmacy Refills  
 omeprazole (PRILOSEC) 20 mg capsule 0 Sig: Take 1 Cap by mouth daily. Class: Normal  
 Pharmacy: [address] [place], [place] [address] #: [phone] Route: Oral  
  
Follow-up Instructions Return in about 1 month (around 5/20/2018). To-Do List   
 04/27/2018 Imaging:  XR BA SWALLOW ESOPHOGRAM   
  
  
Introducing [facility]! [facility] introduces "[employer]" patient portal. Now you can access parts of your medical record, email your doctor's office, and request medication refills online. 1. In your internet browser, go to [email or web address] 2. Click on the First Time User? Click Here link in the Sign In box. You will see the New Member Sign Up page. 3. Enter your NatureBridget Access Code exactly as it appears below. You will not need to use this code after youve completed the sign-up process. If you do not sign up before the expiration date, you must request a new code. · "[employer]" Access Code: [email or web address] Expires: 6/7/2018  2:54 PM 
 
4. Enter the last four digits of your Social Security Number (xxxx) and Date of Birth (mm/dd/yyyy) as indicated and click Submit. You will be taken to the next sign-up page. 5. Create a NatureBridget ID. This will be your NatureBridget login ID and cannot be changed, so think of one that is secure and easy to remember. 6. Create a NatureBridget password. You can change your password at any time. 7. Enter your Password Reset Question and Answer. This can be used at a later time if you forget your password. 8. Enter your e-mail address. You will receive e-mail notification when new information is available in [address] 9. Click Sign Up. You can now view and download portions of your medical record. 10. Click the Download Summary menu link to download a portable copy of your medical information. If you have questions, please visit the Frequently Asked Questions section of the Naroomi website. Remember, Naroomi is NOT to be used for urgent needs. For medical emergencies, dial 911. Now available from your iPhone and Android! Please provide this summary of care documentation to your next provider. Your primary care clinician is listed as Joey Elaine. If you have any questions after today's visit, please call [phone].

## 2018-04-30 RX ORDER — LISINOPRIL 40 MG/1
TABLET ORAL
Qty: 30 TAB | Status: SHIPPED | OUTPATIENT
Start: 2018-04-30 | End: 2019-05-06 | Stop reason: SDUPTHER

## 2018-05-31 RX ORDER — OMEPRAZOLE 20 MG/1
CAPSULE, DELAYED RELEASE ORAL
Qty: 31 CAP | Refills: 10 | Status: SHIPPED | OUTPATIENT
Start: 2018-05-31 | End: 2021-03-31

## 2018-05-31 RX ORDER — HYDRALAZINE HYDROCHLORIDE 100 MG/1
TABLET, FILM COATED ORAL
Qty: 93 TAB | Refills: 10 | Status: SHIPPED | OUTPATIENT
Start: 2018-05-31 | End: 2019-05-04 | Stop reason: SDUPTHER

## 2018-05-31 RX ORDER — AMLODIPINE BESYLATE 10 MG/1
TABLET ORAL
Qty: 31 TAB | Refills: 10 | Status: SHIPPED | OUTPATIENT
Start: 2018-05-31 | End: 2019-05-04 | Stop reason: SDUPTHER

## 2018-06-04 ENCOUNTER — OFFICE VISIT (OUTPATIENT)
Dept: INTERNAL MEDICINE CLINIC | Age: 58
End: 2018-06-04

## 2018-06-04 VITALS
HEIGHT: 68 IN | DIASTOLIC BLOOD PRESSURE: 85 MMHG | BODY MASS INDEX: 31.22 KG/M2 | SYSTOLIC BLOOD PRESSURE: 136 MMHG | HEART RATE: 56 BPM | WEIGHT: 206 LBS | RESPIRATION RATE: 18 BRPM | OXYGEN SATURATION: 98 %

## 2018-06-04 DIAGNOSIS — R73.9 HYPERGLYCEMIA: ICD-10-CM

## 2018-06-04 DIAGNOSIS — R13.14 PHARYNGOESOPHAGEAL DYSPHAGIA: ICD-10-CM

## 2018-06-04 DIAGNOSIS — R56.9 SEIZURES (HCC): ICD-10-CM

## 2018-06-04 DIAGNOSIS — E78.2 MIXED HYPERLIPIDEMIA: ICD-10-CM

## 2018-06-04 DIAGNOSIS — F79 INTELLECTUAL DISABILITY: ICD-10-CM

## 2018-06-04 DIAGNOSIS — F31.70 BIPOLAR DISORDER IN FULL REMISSION, MOST RECENT EPISODE UNSPECIFIED TYPE (HCC): ICD-10-CM

## 2018-06-04 DIAGNOSIS — E66.9 OBESITY (BMI 30.0-34.9): ICD-10-CM

## 2018-06-04 DIAGNOSIS — I10 ESSENTIAL HYPERTENSION: Primary | ICD-10-CM

## 2018-06-04 DIAGNOSIS — R41.3 MEMORY IMPAIRMENT: ICD-10-CM

## 2018-06-04 NOTE — PROGRESS NOTES
HISTORY OF PRESENT ILLNESS  Oly Keen is a 59 y.o. male. Pt. comes in for f/u. Has multiple medical problems. BP is stable. His dysphagia has resolved with PPI. Followed by neurology and psychiatry. History of seizures and bipolar disorder. Medications help. Has not had a seizure in many years. Memory is poor with some cognitive deficit. Is working part-time. Reports compliance with medications and diet. Med list and most recent labs/studies reviewed with pt. Trying to be active physically and has lost some weight. Denies tobacco or alcohol use. Reports no other new c/o. Hypertension    Pertinent negatives include no chest pain, no headaches, no dizziness and no shortness of breath. Mental Health Problem   Pertinent negatives include no chest pain, no abdominal pain, no headaches and no shortness of breath. Dysphagia   Pertinent negatives include no chest pain, no abdominal pain, no headaches and no shortness of breath. Follow Up Chronic Condition   Pertinent negatives include no chest pain, no abdominal pain, no headaches and no shortness of breath. Review of Systems   Constitutional: Negative. HENT: Negative. Eyes: Negative. Respiratory: Negative for shortness of breath. Cardiovascular: Negative for chest pain and leg swelling. Gastrointestinal: Positive for heartburn. Negative for abdominal pain. Genitourinary: Negative for dysuria. Musculoskeletal: Negative for falls and joint pain. Skin: Negative. Neurological: Positive for seizures. Negative for dizziness, sensory change and headaches. Psychiatric/Behavioral: Positive for memory loss. Negative for depression. The patient is nervous/anxious. The patient does not have insomnia. All other systems reviewed and are negative. Physical Exam   Constitutional: He appears well-developed and well-nourished. No distress. obese   HENT:   Head: Normocephalic and atraumatic.    Mouth/Throat: Oropharynx is clear and moist.   Eyes: Conjunctivae are normal.   Poor vision   Neck: Normal range of motion. Neck supple. No JVD present. No thyromegaly present. Cardiovascular: Normal rate, regular rhythm, normal heart sounds and intact distal pulses. No murmur heard. Pulmonary/Chest: Effort normal and breath sounds normal. No respiratory distress. He has no wheezes. He has no rales. Abdominal: Soft. Bowel sounds are normal. He exhibits no distension. There is no tenderness. obese   Musculoskeletal: He exhibits no edema or tenderness. Neurological: He is alert. No cranial nerve deficit. Coordination normal.   A+O to name, Wes, not date   Doesn't know president's name   Skin: Skin is warm and dry. No rash noted. Psychiatric: He has a normal mood and affect. His behavior is normal.   Slow speech  Chronic cognitive deficit   Nursing note and vitals reviewed. ASSESSMENT and PLAN  Diagnoses and all orders for this visit:    1. Essential hypertension  -     METABOLIC PANEL, BASIC; Future    2. Obesity (BMI 30.0-34.9)    3. Mixed hyperlipidemia  -     LIPID PANEL; Future  -     METABOLIC PANEL, BASIC; Future  -     ALT; Future  -     AST; Future    4. Hyperglycemia  -     METABOLIC PANEL, BASIC; Future  -     HEMOGLOBIN A1C WITH EAG; Future    5. Pharyngoesophageal dysphagia    6. Intellectual disability    7. Bipolar disorder in full remission, most recent episode unspecified type ([address]    8. Seizures ([address]    9. Memory impairment      Follow-up Disposition:  Return in about 4 months (around 10/4/2018).    lab results and schedule of future lab studies reviewed with patient  reviewed diet, exercise and weight control  reviewed medications and side effects in detail  F/u with other MD's as scheduled  Overall stable

## 2018-06-04 NOTE — PROGRESS NOTES
Health Maintenance Due   Topic Date Due    FOBT Q 1 YEAR AGE 50-75  04/07/2017       Chief Complaint   Patient presents with    Hypertension    Mental Health Problem    Dysphagia    Follow Up Chronic Condition       1. Have you been to the ER, urgent care clinic since your last visit? Hospitalized since your last visit? No    2. Have you seen or consulted any other health care providers outside of the [address] [place] [address] since your last visit? Include any pap smears or colon screening. No    3) Do you have an Advance Directive on file? no    4) Are you interested in receiving information on Advance Directives? NO      Patient is accompanied by self I have received verbal consent from Silvino Aparicio to discuss any/all medical information while they are present in the room.

## 2018-06-04 NOTE — MR AVS SNAPSHOT
[age] y [address] [facility] 102 [address] [place] [address] 
[phone] Patient: Ronny Santos MRN: [id number] XTM:[id number] Visit Information Date & Time Provider Department Dept. Phone Encounter #  
 6/4/2018  1:45 PM [facility] [phone] [id number] Follow-up Instructions Return in about 4 months (around 10/4/2018). Upcoming Health Maintenance Date Due FOBT Q 1 YEAR AGE 50-75 4/7/2017 MEDICARE YEARLY EXAM 7/8/2018 Influenza Age 5 to Adult 8/1/2018 DTaP/Tdap/Td series (2 - Td) 5/20/2026 Allergies as of 6/4/2018  Review Complete On: 6/4/2018 By: Twin Fisher DO No Known Allergies Current Immunizations  Reviewed on 6/4/2018 Name Date Influenza Nasal Vaccine 10/15/2016 Reviewed by Twin Fisher DO on 6/4/2018 at  2:08 PM  
 Reviewed by Twin Fisher DO on 6/4/2018 at  2:08 PM  
You Were Diagnosed With   
  
 Codes Comments Essential hypertension    -  Primary ICD-10-CM: I10 
ICD-9-CM: 401.9 Obesity (BMI 30.0-34.9)     ICD-10-CM: K38.0 ICD-9-CM: 278.00 Mixed hyperlipidemia     ICD-10-CM: E78.2 ICD-9-CM: 272.2 Hyperglycemia     ICD-10-CM: R73.9 ICD-9-CM: 790.29 Pharyngoesophageal dysphagia     ICD-10-CM: R13.14 ICD-9-CM: 787.24 Intellectual disability     ICD-10-CM: F79 
ICD-9-CM: 345 Bipolar disorder in full remission, most recent episode unspecified type ([facility]     ICD-10-CM: F31.70 ICD-9-CM: 296.80 Seizures ([facility]     ICD-10-CM: R56.9 ICD-9-CM: 780.39 Memory impairment     ICD-10-CM: R41.3 ICD-9-CM: 780.93 Vitals BP Pulse Resp Height(growth percentile) Weight(growth percentile) SpO2  
 136/85 (BP 1 Location: Left arm, BP Patient Position: Sitting) (!) 56 18 5' 8\" (1.727 m) 206 lb (93.4 kg) 98% BMI Smoking Status 31.32 kg/m2 Never Smoker Vitals History BMI and BSA Data Body Mass Index Body Surface Area  
 31.32 kg/m 2 2.12 m 2 Preferred Pharmacy Pharmacy Name Phone [facility], [address] [phone] Your Updated Medication List  
  
   
This list is accurate as of 6/4/18  2:09 PM.  Always use your most recent med list.  
  
  
  
  
 ALAWAY 0.025 % (0.035 %) ophthalmic solution Generic drug:  ketotifen Administer 1 Drop to both eyes two (2) times a day. amLODIPine 10 mg tablet Commonly known as:  Caralee Dupes TAKE 1 TABLET BY MOUTH DAILY  
  
 ammonium lactate 12 % lotion Commonly known as:  LAC-HYDRIN  
  
 BUSPAR 15 mg tablet Generic drug:  busPIRone Take 15 mg by mouth two (2) times a day. cloNIDine HCl 0.3 mg tablet Commonly known as:  CATAPRES  
TAKE 1 TABLET BY MOUTH TWICE A DAY  
  
 erythromycin ophthalmic ointment Commonly known as:  ILOTYCIN Administer  to both eyes nightly. hydrALAZINE 100 mg tablet Commonly known as:  APRESOLINE  
TAKE ONE (1) TABLET BY MOUTH THREE TIMES A DAY. indapamide 1.25 mg tablet Commonly known as:  LOZOL  
TAKE 1 TABLET BY MOUTH DAILY  
  
 KENDALL'S BABY SHAMPOO EX  
by Apply Externally route. latanoprost 0.005 % ophthalmic solution Commonly known as:  XALATAN  
  
 lisinopril 40 mg tablet Commonly known as:  PRINIVIL, ZESTRIL  
TAKE 1 TABLET BY MOUTH DAILY  
  
 LUMIGAN 0.03 % ophthalmic drops Generic drug:  bimatoprost  
Administer 1 Drop to both eyes every evening. metoprolol succinate 50 mg XL tablet Commonly known as:  TOPROL-XL  
TAKE ONE TABLET BY MOUTH AT BEDTIME  
  
 NAMENDA XR 28 mg capsule Generic drug:  memantine ER  
  
 omeprazole 20 mg capsule Commonly known as:  PRILOSEC  
TAKE (1) CAPSULE BY MOUTH DAILY PATANOL 0.1 % ophthalmic solution Generic drug:  olopatadine Administer 2 Drops to both eyes two (2) times a day. * PHENobarbital 60 mg tablet Commonly known as:  LUMINAL Take 2 Tabs by mouth nightly. Max Daily Amount: 120 mg.  
  
 * PHENobarbital 64.8 mg tablet Commonly known as:  LUMINAL  
  
 potassium chloride SR 10 mEq tablet Commonly known as:  KLOR-CON 10  
TAKE 1 TABLET BY MOUTH DAILY  
  
 XANAX 0.25 mg tablet Generic drug:  ALPRAZolam  
Take 0.125 mg by mouth two (2) times a day. ZyPREXA 10 mg tablet Generic drug:  OLANZapine Take 10 mg by mouth nightly. * Notice: This list has 2 medication(s) that are the same as other medications prescribed for you. Read the directions carefully, and ask your doctor or other care provider to review them with you. Follow-up Instructions Return in about 4 months (around 10/4/2018). To-Do List   
 10/04/2018 Lab:  HEMOGLOBIN A1C WITH EAG   
  
 12/01/2018 Lab:  ALT   
  
 12/01/2018 Lab:  AST   
  
 12/01/2018 Lab:  LIPID PANEL   
  
 12/01/2018 Lab:  METABOLIC PANEL, BASIC Introducing [facility] & HEALTH SERVICES! [facility] introduces [email or web address] patient portal. Now you can access parts of your medical record, email your doctor's office, and request medication refills online. 1. In your internet browser, go to [email or web address] 2. Click on the First Time User? Click Here link in the Sign In box. You will see the New Member Sign Up page. 3. Enter your [employer] Access Code exactly as it appears below. You will not need to use this code after youve completed the sign-up process. If you do not sign up before the expiration date, you must request a new code. · [email or web address] Access Code: [email or web address] Expires: 6/7/2018  2:54 PM 
 
4. Enter the last four digits of your Social Security Number (xxxx) and Date of Birth (mm/dd/yyyy) as indicated and click Submit. You will be taken to the next sign-up page. 5. Create a [employer] ID.  This will be your [employer] login ID and cannot be changed, so think of one that is secure and easy to remember. 6. Create a [employer] password. You can change your password at any time. 7. Enter your Password Reset Question and Answer. This can be used at a later time if you forget your password. 8. Enter your e-mail address. You will receive e-mail notification when new information is available in [address] 9. Click Sign Up. You can now view and download portions of your medical record. 10. Click the Download Summary menu link to download a portable copy of your medical information. If you have questions, please visit the Frequently Asked Questions section of the [employer] website. Remember, [employer] is NOT to be used for urgent needs. For medical emergencies, dial 911. Now available from your iPhone and Android! Please provide this summary of care documentation to your next provider. Your primary care clinician is listed as Arcelia Villa. If you have any questions after today's visit, please call [phone].

## 2018-09-28 RX ORDER — INDAPAMIDE 1.25 MG/1
TABLET, FILM COATED ORAL
Qty: 30 TAB | Status: SHIPPED | OUTPATIENT
Start: 2018-09-28 | End: 2019-10-07 | Stop reason: SDUPTHER

## 2018-10-02 ENCOUNTER — HOSPITAL ENCOUNTER (OUTPATIENT)
Dept: LAB | Age: 58
Discharge: HOME | End: 2018-10-02
Payer: MEDICARE

## 2018-10-02 PROCEDURE — 84460 ALANINE AMINO (ALT) (SGPT): CPT

## 2018-10-02 PROCEDURE — 83036 HEMOGLOBIN GLYCOSYLATED A1C: CPT

## 2018-10-02 PROCEDURE — 36415 COLL VENOUS BLD VENIPUNCTURE: CPT

## 2018-10-02 PROCEDURE — 80048 BASIC METABOLIC PNL TOTAL CA: CPT

## 2018-10-02 PROCEDURE — 84450 TRANSFERASE (AST) (SGOT): CPT

## 2018-10-02 PROCEDURE — 80061 LIPID PANEL: CPT

## 2018-10-03 LAB
ALT SERPL-CCNC: 16 IU/L (ref 0–44)
AST SERPL-CCNC: 20 IU/L (ref 0–40)
BUN SERPL-MCNC: 12 MG/DL (ref 6–24)
BUN/CREAT SERPL: 11 (ref 9–20)
CALCIUM SERPL-MCNC: 8.5 MG/DL (ref 8.7–10.2)
CHLORIDE SERPL-SCNC: 101 MMOL/L (ref 96–106)
CHOLEST SERPL-MCNC: 210 MG/DL (ref 100–199)
CO2 SERPL-SCNC: 25 MMOL/L (ref 20–29)
CREAT SERPL-MCNC: 1.12 MG/DL (ref 0.76–1.27)
EST. AVERAGE GLUCOSE BLD GHB EST-MCNC: 105 MG/DL
GLUCOSE SERPL-MCNC: 101 MG/DL (ref 65–99)
HBA1C MFR BLD: 5.3 % (ref 4.8–5.6)
HDLC SERPL-MCNC: 40 MG/DL
INTERPRETATION, 910389: NORMAL
LDLC SERPL CALC-MCNC: 151 MG/DL (ref 0–99)
POTASSIUM SERPL-SCNC: 3.7 MMOL/L (ref 3.5–5.2)
SODIUM SERPL-SCNC: 139 MMOL/L (ref 134–144)
TRIGL SERPL-MCNC: 95 MG/DL (ref 0–149)
VLDLC SERPL CALC-MCNC: 19 MG/DL (ref 5–40)

## 2018-10-04 DIAGNOSIS — R73.9 HYPERGLYCEMIA: ICD-10-CM

## 2018-10-05 ENCOUNTER — OFFICE VISIT (OUTPATIENT)
Dept: INTERNAL MEDICINE CLINIC | Age: 58
End: 2018-10-05

## 2018-10-05 VITALS
OXYGEN SATURATION: 97 % | BODY MASS INDEX: 31.4 KG/M2 | WEIGHT: 207.2 LBS | HEIGHT: 68 IN | HEART RATE: 62 BPM | SYSTOLIC BLOOD PRESSURE: 135 MMHG | RESPIRATION RATE: 14 BRPM | DIASTOLIC BLOOD PRESSURE: 85 MMHG

## 2018-10-05 DIAGNOSIS — I10 ESSENTIAL HYPERTENSION: Primary | ICD-10-CM

## 2018-10-05 DIAGNOSIS — G40.909 SEIZURE DISORDER (HCC): ICD-10-CM

## 2018-10-05 DIAGNOSIS — E78.2 MIXED HYPERLIPIDEMIA: ICD-10-CM

## 2018-10-05 DIAGNOSIS — F31.70 BIPOLAR DISORDER IN FULL REMISSION, MOST RECENT EPISODE UNSPECIFIED TYPE (HCC): ICD-10-CM

## 2018-10-05 DIAGNOSIS — E66.9 OBESITY (BMI 30.0-34.9): ICD-10-CM

## 2018-10-05 DIAGNOSIS — F79 INTELLECTUAL DISABILITY: ICD-10-CM

## 2018-10-05 NOTE — PROGRESS NOTES
High cholesterol ---- watch fatty food/exercise  Low calcium----> start OTC Citracal D1 daily  Other labs are stable

## 2018-10-05 NOTE — MR AVS SNAPSHOT
[address] [facility] 102 [address] [place] [address] 
[phone] Patient: Marie Cowart MRN: [id number] XNS:15/[id number] Visit Information Date & Time Provider Department Dept. Phone Encounter #  
 10/5/2018  1:45 PM [facility] Internal Medicine [phone] [id number] Follow-up Instructions Return in about 6 months (around 4/5/2019). Upcoming Health Maintenance Date Due Shingrix Vaccine Age 50> (1 of 2) 10/22/2008 FOBT Q 1 YEAR AGE 50-75 4/7/2017 MEDICARE YEARLY EXAM 7/8/2018 Influenza Age 5 to Adult 8/1/2018 DTaP/Tdap/Td series (2 - Td) 5/20/2026 Allergies as of 10/5/2018  Review Complete On: 10/5/2018 By: Lorena Cheng, DO No Known Allergies Current Immunizations  Reviewed on 6/4/2018 Name Date Influenza Nasal Vaccine 10/15/2016 Influenza Vaccine 9/28/2018 Not reviewed this visit You Were Diagnosed With   
  
 Codes Comments Essential hypertension    -  Primary ICD-10-CM: I10 
ICD-9-CM: 401.9 Mixed hyperlipidemia     ICD-10-CM: E78.2 ICD-9-CM: 272.2 Obesity (BMI 30.0-34.9)     ICD-10-CM: Q63.2 ICD-9-CM: 278.00 Intellectual disability     ICD-10-CM: F79 
ICD-9-CM: 468 Seizure disorder ([facility]     ICD-10-CM: G40.909 ICD-9-CM: 345.90 Bipolar disorder in full remission, most recent episode unspecified type ([facility]     ICD-10-CM: F31.70 ICD-9-CM: 296.80 Vitals BP Pulse Resp Height(growth percentile) Weight(growth percentile) SpO2  
 135/85 (BP 1 Location: Left arm, BP Patient Position: Sitting) 62 14 5' 8\" (1.727 m) 207 lb 3.2 oz (94 kg) 97% BMI Smoking Status 31.5 kg/m2 Never Smoker Vitals History BMI and BSA Data Body Mass Index Body Surface Area 31.5 kg/m 2 2.12 m 2 Preferred Pharmacy Pharmacy Name Phone [facility] [phone] [phone] Your Updated Medication List  
  
   
This list is accurate as of 10/5/18  2:12 PM.  Always use your most recent med list.  
  
  
  
  
 ALAWAY 0.025 % (0.035 %) ophthalmic solution Generic drug:  ketotifen Administer 1 Drop to both eyes two (2) times a day. amLODIPine 10 mg tablet Commonly known as:  Dalila Beer TAKE 1 TABLET BY MOUTH DAILY  
  
 ammonium lactate 12 % lotion Commonly known as:  LAC-HYDRIN  
  
 BUSPAR 15 mg tablet Generic drug:  busPIRone Take 15 mg by mouth two (2) times a day. cloNIDine HCl 0.3 mg tablet Commonly known as:  CATAPRES  
TAKE 1 TABLET BY MOUTH TWICE A DAY  
  
 erythromycin ophthalmic ointment Commonly known as:  ILOTYCIN Administer  to both eyes nightly. hydrALAZINE 100 mg tablet Commonly known as:  APRESOLINE  
TAKE ONE (1) TABLET BY MOUTH THREE TIMES A DAY. indapamide 1.25 mg tablet Commonly known as:  LOZOL  
TAKE 1 TABLET BY MOUTH DAILY  
  
 KENDALL'S BABY SHAMPOO EX  
by Apply Externally route. latanoprost 0.005 % ophthalmic solution Commonly known as:  XALATAN  
  
 lisinopril 40 mg tablet Commonly known as:  PRINIVIL, ZESTRIL  
TAKE 1 TABLET BY MOUTH DAILY  
  
 LUMIGAN 0.03 % ophthalmic drops Generic drug:  bimatoprost  
Administer 1 Drop to both eyes every evening. metoprolol succinate 50 mg XL tablet Commonly known as:  TOPROL-XL  
TAKE ONE TABLET BY MOUTH AT BEDTIME  
  
 NAMENDA XR 28 mg capsule Generic drug:  memantine ER  
  
 omeprazole 20 mg capsule Commonly known as:  PRILOSEC  
TAKE (1) CAPSULE BY MOUTH DAILY PATANOL 0.1 % ophthalmic solution Generic drug:  olopatadine Administer 2 Drops to both eyes two (2) times a day. * PHENobarbital 60 mg tablet Commonly known as:  LUMINAL Take 2 Tabs by mouth nightly. Max Daily Amount: 120 mg.  
  
 * PHENobarbital 64.8 mg tablet Commonly known as:  LUMINAL  
  
 potassium chloride SR 10 mEq tablet Commonly known as:  KLOR-CON 10  
 TAKE 1 TABLET BY MOUTH DAILY PRESERVISION AREDS PO Take  by mouth. XANAX 0.25 mg tablet Generic drug:  ALPRAZolam  
Take 0.125 mg by mouth two (2) times a day. ZyPREXA 10 mg tablet Generic drug:  OLANZapine Take 10 mg by mouth nightly. * Notice: This list has 2 medication(s) that are the same as other medications prescribed for you. Read the directions carefully, and ask your doctor or other care provider to review them with you. Follow-up Instructions Return in about 6 months (around 4/5/2019). To-Do List   
 04/03/2019 Lab:  ALT   
  
 04/03/2019 Lab:  AST   
  
 04/03/2019 Lab:  LIPID PANEL   
  
 04/03/2019 Lab:  METABOLIC PANEL, BASIC Introducing \[facility]\"" & HEALTH SERVICES! [facility] introduces [employer] patient portal. Now you can access parts of your medical record, email your doctor's office, and request medication refills online. 1. In your internet browser, go to [email or web address] [email or web address] 2. Click on the First Time User? Click Here link in the Sign In box. You will see the New Member Sign Up page. 3. Enter your [employer] Access Code exactly as it appears below. You will not need to use this code after youve completed the sign-up process. If you do not sign up before the expiration date, you must request a new code. · [employer] Access Code: [email or web address] Expires: 1/3/2019  2:10 PM 
 
4. Enter the last four digits of your Social Security Number (xxxx) and Date of Birth (mm/dd/yyyy) as indicated and click Submit. You will be taken to the next sign-up page. 5. Create a [employer] ID. This will be your [employer] login ID and cannot be changed, so think of one that is secure and easy to remember. 6. Create a [employer] password. You can change your password at any time. 7. Enter your Password Reset Question and Answer. This can be used at a later time if you forget your password. 8. Enter your e-mail address. You will receive e-mail notification when new information is available in [address] 9. Click Sign Up. You can now view and download portions of your medical record. 10. Click the Download Summary menu link to download a portable copy of your medical information. If you have questions, please visit the Frequently Asked Questions section of the [employer] website. Remember, [employer] is NOT to be used for urgent needs. For medical emergencies, dial 911. Now available from your iPhone and Android! Please provide this summary of care documentation to your next provider. Your primary care clinician is listed as Nitesh Marin. If you have any questions after today's visit, please call [phone].

## 2018-10-05 NOTE — PROGRESS NOTES
Health Maintenance Due   Topic Date Due    Shingrix Vaccine Age 50> (1 of 2) 10/22/2008    FOBT Q 1 YEAR AGE 50-75  04/07/2017    MEDICARE YEARLY EXAM  07/08/2018    Influenza Age 5 to Adult  08/01/2018       Chief Complaint   Patient presents with    Complete Physical       1. Have you been to the ER, urgent care clinic since your last visit? Hospitalized since your last visit? No    2. Have you seen or consulted any other health care providers outside of the [facility] since your last visit? Include any pap smears or colon screening. No    3) Do you have an Advance Directive on file? no    4) Are you interested in receiving information on Advance Directives? NO      Patient is accompanied by adult caretaker I have received verbal consent from Arlin Reed to discuss any/all medical information while they are present in the room.

## 2018-10-24 ENCOUNTER — OFFICE VISIT (OUTPATIENT)
Dept: INTERNAL MEDICINE CLINIC | Age: 58
End: 2018-10-24

## 2018-10-24 VITALS
SYSTOLIC BLOOD PRESSURE: 131 MMHG | HEART RATE: 59 BPM | WEIGHT: 213.2 LBS | DIASTOLIC BLOOD PRESSURE: 83 MMHG | TEMPERATURE: 98.6 F | OXYGEN SATURATION: 98 % | HEIGHT: 68 IN | RESPIRATION RATE: 18 BRPM | BODY MASS INDEX: 32.31 KG/M2

## 2018-10-24 DIAGNOSIS — E66.9 OBESITY (BMI 30.0-34.9): ICD-10-CM

## 2018-10-24 DIAGNOSIS — Z00.00 MEDICARE ANNUAL WELLNESS VISIT, SUBSEQUENT: ICD-10-CM

## 2018-10-24 DIAGNOSIS — Z71.89 ACP (ADVANCE CARE PLANNING): ICD-10-CM

## 2018-10-24 DIAGNOSIS — K21.9 GASTROESOPHAGEAL REFLUX DISEASE WITHOUT ESOPHAGITIS: ICD-10-CM

## 2018-10-24 DIAGNOSIS — J30.2 SEASONAL ALLERGIC RHINITIS, UNSPECIFIED TRIGGER: Primary | ICD-10-CM

## 2018-10-24 RX ORDER — OMEPRAZOLE 20 MG/1
20 CAPSULE, DELAYED RELEASE ORAL DAILY
Qty: 90 CAP | Refills: 1 | Status: SHIPPED | OUTPATIENT
Start: 2018-10-24 | End: 2019-04-05 | Stop reason: SDUPTHER

## 2018-10-24 RX ORDER — FLUTICASONE PROPIONATE 50 MCG
2 SPRAY, SUSPENSION (ML) NASAL DAILY
Qty: 1 BOTTLE | Refills: 1 | Status: SHIPPED | OUTPATIENT
Start: 2018-10-24 | End: 2019-04-05 | Stop reason: SDUPTHER

## 2018-10-24 RX ORDER — CETIRIZINE HCL 10 MG
10 TABLET ORAL DAILY
Qty: 90 TAB | Refills: 0 | Status: SHIPPED | OUTPATIENT
Start: 2018-10-24 | End: 2019-01-31 | Stop reason: SDUPTHER

## 2018-10-24 RX ORDER — NEOMYCIN/POLYMYXIN B/PRAMOXINE 3.5-10K-1
1 CREAM (GRAM) TOPICAL DAILY
Qty: 90 TAB | Refills: 1 | Status: SHIPPED | OUTPATIENT
Start: 2018-10-24

## 2018-10-24 NOTE — PATIENT INSTRUCTIONS
Schedule of Personalized Health Plan (Provide Copy to Patient) The best way to stay healthy is to live a healthy lifestyle. A healthy lifestyle includes regular exercise, eating a well-balanced diet, keeping a healthy weight and not smoking. Regular physical exams and screening tests are another important way to take care of yourself. Preventive exams provided by health care providers can find health problems early when treatment works best and can keep you from getting certain diseases or illnesses. Preventive services include exams, lab tests, screenings, shots, monitoring and information to help you take care of your own health. All people over 65 should have a pneumonia shot. Pneumonia shots are usually only needed once in a lifetime unless your doctor decides differently. All people over 65 should have a yearly flu shot. People over 65 are at medium to high risk for Hepatitis B. Three shots are needed for complete protection. In addition to your physical exam, some screening tests are recommended: 
 
Bone mass measurement (dexa scan) is recommended every two years if you have certain risk factors, such as personal history of vertebral fracture or chronic steroid medication use Diabetes Mellitus screening is recommended every year. Glaucoma is an eye disease caused by high pressure in the eye. An eye exam is recommended every year. Cardiovascular screening tests that check your cholesterol and other blood fat (lipid) levels are recommended every five years. Colorectal Cancer screening tests help to find pre-cancerous polyps (growths in the colon) so they can be removed before they turn into cancer. Tests ordered for screening depend on your personal and family history risk factors. Screening for Prostate Cancer is recommended yearly with a digital rectal exam and/or a PSA test 
 
Here is a list of your current Health Maintenance items with a due date: 
Health Maintenance Topic Date Due  Shingrix Vaccine Age 50> (1 of 2) 10/22/2008  FOBT Q 1 YEAR AGE 50-75  04/07/2017  MEDICARE YEARLY EXAM  10/25/2019  
 DTaP/Tdap/Td series (2 - Td) 05/20/2026  Influenza Age 5 to Adult  Completed  Hepatitis C Screening  Addressed

## 2018-10-24 NOTE — PROGRESS NOTES
Health Maintenance Due Topic Date Due  Shingrix Vaccine Age 50> (1 of 2) 10/22/2008  FOBT Q 1 YEAR AGE 50-75  04/07/2017  MEDICARE YEARLY EXAM  07/08/2018 Chief Complaint Patient presents with  Cold  
  runny nose, cough, sore throat,   
 
 
1. Have you been to the ER, urgent care clinic since your last visit? Hospitalized since your last visit? No 
 
2. Have you seen or consulted any other health care providers outside of the [address] [place] [address] since your last visit? Include any pap smears or colon screening. No 
 
3) Do you have an Advance Directive on file? no 
 
4) Are you interested in receiving information on Advance Directives? NO Patient is accompanied by adult caretaker I have received verbal consent from Sangeetha Harry to discuss any/all medical information while they are present in the room.

## 2018-10-24 NOTE — PROGRESS NOTES
Lj España is a 59 y.o. male and presents for annual Medicare Wellness Visit. Problem List: Reviewed with patient and discussed risk factors. Patient Active Problem List  
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  
 Hypertension I10  
 Brain trauma ([facility] S06. 9X5A  
 Intellectual disability F68  
 ACP (advance care planning) Z70.80  
 DOROTA (generalized anxiety disorder) F41.1  Dementia with behavioral disturbance F03.91  
 Prediabetes R73.03  
 Obesity (BMI 30.0-34. 9) E66.9  Mixed hyperlipidemia E78.2  Memory impairment R41.3  Bipolar disorder in full remission ([facility] F31.70  Hyperglycemia R73.9  Pharyngoesophageal dysphagia R13.14  
 Gastroesophageal reflux disease without esophagitis K21.9 Current medical providers:  Patient Care Team: 
Stephen Bryant DO as PCP - General (Internal Medicine) Jonathan Parra MD as Physician (Ophthalmology) PSH: Reviewed with patient History reviewed. No pertinent surgical history. SH: Reviewed with patient Social History Tobacco Use  Smoking status: Never Smoker  Smokeless tobacco: Never Used Substance Use Topics  Alcohol use: No  
  Alcohol/week: 0.0 oz  Drug use: No  
 
 
FH: Reviewed with patient Family History Problem Relation Age of Onset  No Known Problems Mother  No Known Problems Father Medications/Allergies: Reviewed with patient Current Outpatient Medications on File Prior to Visit Medication Sig Dispense Refill  indapamide (LOZOL) 1.25 mg tablet TAKE 1 TABLET BY MOUTH DAILY 30 Tab PRN  
 hydrALAZINE (APRESOLINE) 100 mg tablet TAKE ONE (1) TABLET BY MOUTH THREE TIMES A DAY.  93 Tab 10  
 amLODIPine (NORVASC) 10 mg tablet TAKE 1 TABLET BY MOUTH DAILY 31 Tab 10  
 lisinopril (PRINIVIL, ZESTRIL) 40 mg tablet TAKE 1 TABLET BY MOUTH DAILY 30 Tab PRN  
  potassium chloride SR (KLOR-CON 10) 10 mEq tablet TAKE 1 TABLET BY MOUTH DAILY 28 Tab PRN  
 metoprolol succinate (TOPROL-XL) 50 mg XL tablet TAKE ONE TABLET BY MOUTH AT BEDTIME 31 Tab PRN  
 cloNIDine HCl (CATAPRES) 0.3 mg tablet TAKE 1 TABLET BY MOUTH TWICE A DAY 62 Tab PRN  
 erythromycin (ILOTYCIN) ophthalmic ointment Administer  to both eyes nightly.  PHENobarbital (LUMINAL) 64.8 mg tablet  latanoprost (XALATAN) 0.005 % ophthalmic solution  NAMENDA XR 28 mg capsule  PHENobarbital (LUMINAL) 60 mg tablet Take 2 Tabs by mouth nightly. Max Daily Amount: 120 mg. 60 Tab 5  
 ammonium lactate (LAC-HYDRIN) 12 % lotion  olanzapine (ZYPREXA) 10 mg tablet Take 10 mg by mouth nightly.  bimatoprost (LUMIGAN) 0.03 % ophthalmic drops Administer 1 Drop to both eyes every evening.  alprazolam (XANAX) 0.25 mg tablet Take 0.125 mg by mouth two (2) times a day.  busPIRone (BUSPAR) 15 mg tablet Take 15 mg by mouth two (2) times a day.  vit A/vit C/vit E/zinc/copper (PRESERVISION AREDS PO) Take  by mouth.  omeprazole (PRILOSEC) 20 mg capsule TAKE (1) CAPSULE BY MOUTH DAILY 31 Cap 10  
 SOAP (KENDALL'S BABY SHAMPOO EX) by Apply Externally route.  ketotifen (ALAWAY) 0.025 % ophthalmic solution Administer 1 Drop to both eyes two (2) times a day.  olopatadine (PATANOL) 0.1 % ophthalmic solution Administer 2 Drops to both eyes two (2) times a day. No current facility-administered medications on file prior to visit. No Known Allergies Objective: 
Visit Vitals /83 (BP 1 Location: Left arm, BP Patient Position: Sitting) Pulse (!) 59 Temp 98.6 °F (37 °C) (Oral) Resp 18 Ht 5' 8\" (1.727 m) Wt 213 lb 3.2 oz (96.7 kg) SpO2 98% BMI 32.42 kg/m² Body mass index is 32.42 kg/m². Assessment of cognitive impairment: Alert and oriented x 2 Depression Screen: PHQ over the last two weeks 7/7/2017 Little interest or pleasure in doing things Not at all Feeling down, depressed, irritable, or hopeless Not at all Total Score PHQ 2 0 Fall Risk Assessment:   
Fall Risk Assessment, last 12 mths 7/7/2017 Able to walk? Yes Fall in past 12 months? No  
 
 
Functional Ability:  
Does the patient exhibit a steady gait? yes How long did it take the patient to get up and walk from a sitting position? <5 seconds Is the patient self reliant?  (ie can do own laundry, meals, household chores)  yes Does the patient handle his/her own medications?  no 
  
Does the patient handle his/her own money? no 
  
Is the patients home safe (ie good lighting, handrails on stairs and bath, etc.)? yes Did you notice or did patient express any hearing difficulties? no 
  
Did you notice or did patient express any vision difficulties? yes Advance Care Planning:  
Patient was offered the opportunity to discuss advance care planning:  yes Does patient have an Advance Directive:  no  
  
 
Plan:   
 
Orders Placed This Encounter  calcium phosphate-vitamin D3 (CITRACAL + D3, CALCIUM PHOS,) 250 mg calcium- 500 unit chew  cetirizine (ZYRTEC) 10 mg tablet  fluticasone (FLONASE) 50 mcg/actuation nasal spray  omeprazole (PRILOSEC) 20 mg capsule Health Maintenance Topic Date Due  Shingrix Vaccine Age 50> (1 of 2) 10/22/2008  FOBT Q 1 YEAR AGE 50-75  04/07/2017  MEDICARE YEARLY EXAM  10/25/2019  
 DTaP/Tdap/Td series (2 - Td) 05/20/2026  Influenza Age 5 to Adult  Completed  Hepatitis C Screening  Addressed *Patient verbalized understanding and agreement with the plan. A copy of the After Visit Summary with personalized health plan was given to the patient today. Cody Styles

## 2018-10-24 NOTE — PROGRESS NOTES
Subjective: Chief Complaint Patient presents with  Cold  
  runny nose, cough, sore throat, History of Present Illness Reuben Hinojosa is a 59y.o. year old male who is a patient of Dr. Wu Adames that presents today with his  for complaints of rhinorrhea, cough, and sore throat. Onset Sunday. Patient has been exposed to others with the same complaints. He denies any fever. He does not take any OTC allergy medications. Has multiple medical problems. BP is stable. His dysphagia has resolved with PPI. Followed by neurology and psychiatry. History of seizures and bipolar disorder. Medications help. Has not had a seizure in many years. Memory is poor with some cognitive deficit. Is working part-time. Reports compliance with medications and diet. Med list and most recent labs/studies reviewed with pt. Trying to be active physically and has lost some weight. Denies tobacco or alcohol use. Reports no other new c/o. Current Outpatient Medications on File Prior to Visit Medication Sig Dispense Refill  indapamide (LOZOL) 1.25 mg tablet TAKE 1 TABLET BY MOUTH DAILY 30 Tab PRN  
 hydrALAZINE (APRESOLINE) 100 mg tablet TAKE ONE (1) TABLET BY MOUTH THREE TIMES A DAY. 93 Tab 10  
 amLODIPine (NORVASC) 10 mg tablet TAKE 1 TABLET BY MOUTH DAILY 31 Tab 10  
 lisinopril (PRINIVIL, ZESTRIL) 40 mg tablet TAKE 1 TABLET BY MOUTH DAILY 30 Tab PRN  potassium chloride SR (KLOR-CON 10) 10 mEq tablet TAKE 1 TABLET BY MOUTH DAILY 28 Tab PRN  
 metoprolol succinate (TOPROL-XL) 50 mg XL tablet TAKE ONE TABLET BY MOUTH AT BEDTIME 31 Tab PRN  
 cloNIDine HCl (CATAPRES) 0.3 mg tablet TAKE 1 TABLET BY MOUTH TWICE A DAY 62 Tab PRN  
 erythromycin (ILOTYCIN) ophthalmic ointment Administer  to both eyes nightly.  PHENobarbital (LUMINAL) 64.8 mg tablet  latanoprost (XALATAN) 0.005 % ophthalmic solution  NAMENDA XR 28 mg capsule  PHENobarbital (LUMINAL) 60 mg tablet Take 2 Tabs by mouth nightly. Max Daily Amount: 120 mg. 60 Tab 5  
 ammonium lactate (LAC-HYDRIN) 12 % lotion  olanzapine (ZYPREXA) 10 mg tablet Take 10 mg by mouth nightly.  bimatoprost (LUMIGAN) 0.03 % ophthalmic drops Administer 1 Drop to both eyes every evening.  alprazolam (XANAX) 0.25 mg tablet Take 0.125 mg by mouth two (2) times a day.  busPIRone (BUSPAR) 15 mg tablet Take 15 mg by mouth two (2) times a day.  vit A/vit C/vit E/zinc/copper (PRESERVISION AREDS PO) Take  by mouth.  omeprazole (PRILOSEC) 20 mg capsule TAKE (1) CAPSULE BY MOUTH DAILY 31 Cap 10  
 SOAP (KENDALL'S BABY SHAMPOO EX) by Apply Externally route.  ketotifen (ALAWAY) 0.025 % ophthalmic solution Administer 1 Drop to both eyes two (2) times a day.  olopatadine (PATANOL) 0.1 % ophthalmic solution Administer 2 Drops to both eyes two (2) times a day. No current facility-administered medications on file prior to visit. No Known Allergies Past Medical History:  
Diagnosis Date  Anxiety  Arthritis  Brain trauma ([facility] [address]   
 yuri kelly md psychiatry  Constipation  Glaucoma  Hypercholesterolemia  Hypertension  Intellectual disability   
 [facility]   
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   
 rp  abhay adriana -[phone]  S/P colonoscopy 4-6-12  
 rt n  
 S/P total hip arthroplasty   
 left  Seizures ([facility] [address] History reviewed. No pertinent surgical history. Social History Tobacco Use  Smoking status: Never Smoker  Smokeless tobacco: Never Used Substance Use Topics  Alcohol use: No  
  Alcohol/week: 0.0 oz  Drug use: No  
  
Family History Problem Relation Age of Onset  No Known Problems Mother  No Known Problems Father Objective:  
 
Vitals:  
 10/24/18 1449 BP: 131/83 Pulse: (!) 59 Resp: 18 Temp: 98.6 °F (37 °C) TempSrc: Oral  
 SpO2: 98% Weight: 213 lb 3.2 oz (96.7 kg) Height: 5' 8\" (1.727 m) Review of Systems Constitutional: Negative. Negative for fever. HENT: Positive for congestion and sore throat. Eyes: Negative. Respiratory: Positive for cough. Negative for shortness of breath. Cardiovascular: Negative for chest pain and leg swelling. Gastrointestinal: Positive for heartburn. Negative for abdominal pain. Genitourinary: Negative for dysuria. Musculoskeletal: Negative for falls and joint pain. Skin: Negative. Neurological: Negative for dizziness, sensory change and headaches. Psychiatric/Behavioral: Positive for memory loss. Negative for depression. The patient is nervous/anxious. The patient does not have insomnia. All other systems reviewed and are negative. Physical Exam  
Constitutional: He appears well-developed and well-nourished. No distress. Well-appearing AA male. NAD Intellectual delay HENT:  
Head: Normocephalic and atraumatic. Nose: Rhinorrhea present. Right sinus exhibits no frontal sinus tenderness. Left sinus exhibits no frontal sinus tenderness. Mouth/Throat: Posterior oropharyngeal erythema present. Cerumen impaction bilaterally. Eyes: Conjunctivae are normal. No scleral icterus. Poor vision Neck: Normal range of motion. Neck supple. No JVD present. No thyromegaly present. Cardiovascular: Normal rate, regular rhythm, normal heart sounds and intact distal pulses. No murmur heard. Pulmonary/Chest: Effort normal and breath sounds normal. No respiratory distress. He has no wheezes. He has no rales. Abdominal: Soft. Bowel sounds are normal. He exhibits no distension. There is no tenderness. Musculoskeletal: Normal range of motion. He exhibits no edema, tenderness or deformity. Lymphadenopathy:  
     Head (right side): Submandibular adenopathy present. Head (left side): Submandibular adenopathy present. Neurological: He is alert. No cranial nerve deficit. Coordination normal.  
A+O to name, Wes, not date Doesn't know president's name Skin: Skin is warm and dry. No rash noted. He is not diaphoretic. Psychiatric: He has a normal mood and affect. His behavior is normal.  
Slow speech Chronic cognitive deficit Nursing note and vitals reviewed. Assessment/ Plan:  
Diagnoses and all orders for this visit: 1. Seasonal allergic rhinitis, unspecified trigger Will order 
-     cetirizine (ZYRTEC) 10 mg tablet; Take 1 Tab by mouth daily. -     fluticasone (FLONASE) 50 mcg/actuation nasal spray; 2 Sprays by Both Nostrils route daily. 2. Obesity (BMI 30.0-34. 9) Addressed weight, diet and exercise with patient. Decrease carbohydrates (white foods, sweet foods, sweet drinks and alcohol), increase green leafy vegetables and protein (lean meats and beans) with each meal. Avoid fried foods. Eat 3-5 small meals daily. Do not skip meals. Increase water intake. Increase physical activity to 30 minutes daily for health benefit or 60 minutes daily to prevent weight regain, as tolerated. Get 7-8 hours uninterrupted sleep nightly. 3. Gastroesophageal reflux disease without esophagitis Will order 
-     omeprazole (PRILOSEC) 20 mg capsule; Take 1 Cap by mouth daily. Other orders 
-     calcium phosphate-vitamin D3 (CITRACAL + D3, CALCIUM PHOS,) 250 mg calcium- 500 unit chew; Take 1 Tab by mouth daily. Patient's plan of care has been reviewed with them. Patient and/or family have verbally conveyed their understanding and agreement of the patient's signs, symptoms, diagnosis, treatment and prognosis and additionally agree to follow up as recommended or return to [facility] should their condition change prior to follow-up.   Discharge instructions have also been provided to the patient with some educational information regarding their diagnosis as well a list of reasons why they would want to return to the office prior to their follow-up appointment should their condition change. Follow-up with Dr. Ashlyn Ugarte as scheduled.

## 2018-10-29 RX ORDER — CLONIDINE HYDROCHLORIDE 0.3 MG/1
TABLET ORAL
Qty: 62 TAB | Status: SHIPPED | OUTPATIENT
Start: 2018-10-29 | End: 2019-11-04 | Stop reason: SDUPTHER

## 2018-10-30 NOTE — PROGRESS NOTES
HISTORY OF PRESENT ILLNESS  Martinez Stafford is a 59 y.o. male. Pt. comes in with his  for f/u. Has multiple medical problems. BP is stable. He is a poor historian with cognitive impairment. Followed by psychiatrist for bipolar disorder. He is obese. History of TBI with seizures. Has not had a seizure in a while. No recent hospitalizations. Reports compliance with medications and diet. Med list and most recent labs/studies reviewed with pt. Trying to be active physically as tolerated. No tobacco or alcohol use. . Reports no other new c/o. HPI    Review of Systems   Constitutional: Negative. HENT: Negative. Eyes: Negative. Respiratory: Negative for shortness of breath. Cardiovascular: Negative for chest pain and leg swelling. Gastrointestinal: Positive for heartburn. Negative for abdominal pain. Genitourinary: Negative for dysuria. Musculoskeletal: Negative for falls and joint pain. Skin: Negative. Neurological: Positive for seizures. Negative for dizziness, sensory change and headaches. Psychiatric/Behavioral: Positive for memory loss. Negative for depression. The patient is nervous/anxious. The patient does not have insomnia. All other systems reviewed and are negative. Physical Exam   Constitutional: He appears well-developed and well-nourished. No distress. obese   HENT:   Head: Normocephalic and atraumatic. Mouth/Throat: Oropharynx is clear and moist.   Eyes: Conjunctivae are normal.   Poor vision   Neck: Normal range of motion. Neck supple. No JVD present. No thyromegaly present. Cardiovascular: Normal rate, regular rhythm, normal heart sounds and intact distal pulses. No murmur heard. Pulmonary/Chest: Effort normal and breath sounds normal. No respiratory distress. He has no wheezes. He has no rales. Abdominal: Soft. Bowel sounds are normal. He exhibits no distension. There is no tenderness.    obese   Musculoskeletal: He exhibits no edema or tenderness. Neurological: He is alert. No cranial nerve deficit. Coordination normal.   A+O to name, Wes, not date   Doesn't know president's name   Skin: Skin is warm and dry. No rash noted. Psychiatric: He has a normal mood and affect. His behavior is normal.   Slow speech  Chronic cognitive deficit   Nursing note and vitals reviewed. ASSESSMENT and PLAN  Diagnoses and all orders for this visit:    1. Essential hypertension  -     LIPID PANEL; Future  -     METABOLIC PANEL, BASIC; Future  -     ALT; Future  -     AST; Future    2. Mixed hyperlipidemia  -     LIPID PANEL; Future  -     METABOLIC PANEL, BASIC; Future  -     ALT; Future  -     AST; Future    3. Obesity (BMI 30.0-34.9)    4. Intellectual disability    5. Seizure disorder ([facility] [address]    6. Bipolar disorder in full remission, most recent episode unspecified type [facility])      Follow-up Disposition:  Return in about 6 months (around 4/5/2019).    lab results and schedule of future lab studies reviewed with patient  reviewed diet, exercise and weight control  reviewed medications and side effects in detail  F/u with other MD's as scheduled  Overall stable

## 2018-10-31 ENCOUNTER — TELEPHONE (OUTPATIENT)
Dept: INTERNAL MEDICINE CLINIC | Age: 58
End: 2018-10-31

## 2018-10-31 NOTE — TELEPHONE ENCOUNTER
The calcium pill and the multi vit pill are not covered by insurance they will cover regular pills he swallows.  Also do you want him to have both the vitamins

## 2018-12-01 DIAGNOSIS — E78.2 MIXED HYPERLIPIDEMIA: ICD-10-CM

## 2018-12-01 DIAGNOSIS — R73.9 HYPERGLYCEMIA: ICD-10-CM

## 2018-12-01 DIAGNOSIS — I10 ESSENTIAL HYPERTENSION: ICD-10-CM

## 2019-01-24 RX ORDER — FLUTICASONE PROPIONATE 50 MCG
SPRAY, SUSPENSION (ML) NASAL
Qty: 16 G | Refills: 11 | Status: SHIPPED | OUTPATIENT
Start: 2019-01-24 | End: 2020-02-17 | Stop reason: SDUPTHER

## 2019-01-30 RX ORDER — METOPROLOL SUCCINATE 50 MG/1
TABLET, EXTENDED RELEASE ORAL
Qty: 31 TAB | Status: SHIPPED | OUTPATIENT
Start: 2019-01-30 | End: 2020-06-30

## 2019-02-07 NOTE — PROGRESS NOTES
I have attempted without success to contact this patient by phone to discuss lab results. Number out of service. Letter mailed.

## 2019-02-28 RX ORDER — POTASSIUM CHLORIDE 750 MG/1
TABLET, FILM COATED, EXTENDED RELEASE ORAL
Qty: 28 TAB | Status: SHIPPED | OUTPATIENT
Start: 2019-02-28 | End: 2020-03-02

## 2019-03-30 LAB
ALT SERPL-CCNC: 16 IU/L (ref 0–44)
AST SERPL-CCNC: 23 IU/L (ref 0–40)
BUN SERPL-MCNC: 15 MG/DL (ref 8–27)
BUN/CREAT SERPL: 13 (ref 10–24)
CALCIUM SERPL-MCNC: 8.7 MG/DL (ref 8.6–10.2)
CHLORIDE SERPL-SCNC: 100 MMOL/L (ref 96–106)
CHOLEST SERPL-MCNC: 192 MG/DL (ref 100–199)
CO2 SERPL-SCNC: 26 MMOL/L (ref 20–29)
CREAT SERPL-MCNC: 1.17 MG/DL (ref 0.76–1.27)
GLUCOSE SERPL-MCNC: 101 MG/DL (ref 65–99)
HDLC SERPL-MCNC: 37 MG/DL
INTERPRETATION, 910389: NORMAL
LDLC SERPL CALC-MCNC: 131 MG/DL (ref 0–99)
POTASSIUM SERPL-SCNC: 3.8 MMOL/L (ref 3.5–5.2)
SODIUM SERPL-SCNC: 139 MMOL/L (ref 134–144)
TRIGL SERPL-MCNC: 119 MG/DL (ref 0–149)
VLDLC SERPL CALC-MCNC: 24 MG/DL (ref 5–40)

## 2019-04-03 DIAGNOSIS — I10 ESSENTIAL HYPERTENSION: ICD-10-CM

## 2019-04-03 DIAGNOSIS — E78.2 MIXED HYPERLIPIDEMIA: ICD-10-CM

## 2019-04-05 ENCOUNTER — OFFICE VISIT (OUTPATIENT)
Dept: INTERNAL MEDICINE CLINIC | Age: 59
End: 2019-04-05

## 2019-04-05 VITALS
RESPIRATION RATE: 18 BRPM | TEMPERATURE: 98.8 F | OXYGEN SATURATION: 95 % | WEIGHT: 212 LBS | HEIGHT: 68 IN | HEART RATE: 61 BPM | DIASTOLIC BLOOD PRESSURE: 56 MMHG | SYSTOLIC BLOOD PRESSURE: 102 MMHG | BODY MASS INDEX: 32.13 KG/M2

## 2019-04-05 DIAGNOSIS — F31.70 BIPOLAR DISORDER IN FULL REMISSION, MOST RECENT EPISODE UNSPECIFIED TYPE (HCC): ICD-10-CM

## 2019-04-05 DIAGNOSIS — G40.909 SEIZURE DISORDER (HCC): ICD-10-CM

## 2019-04-05 DIAGNOSIS — E66.9 OBESITY (BMI 30.0-34.9): ICD-10-CM

## 2019-04-05 DIAGNOSIS — E78.2 MIXED HYPERLIPIDEMIA: ICD-10-CM

## 2019-04-05 DIAGNOSIS — R41.3 MEMORY IMPAIRMENT: ICD-10-CM

## 2019-04-05 DIAGNOSIS — Z87.820 HISTORY OF TRAUMATIC BRAIN INJURY: ICD-10-CM

## 2019-04-05 DIAGNOSIS — I10 ESSENTIAL HYPERTENSION: Primary | ICD-10-CM

## 2019-04-05 RX ORDER — OLANZAPINE 7.5 MG/1
7.5 TABLET ORAL
COMMUNITY

## 2019-04-05 NOTE — PROGRESS NOTES
Patti Escamilla is a 59 y.o. male    Chief Complaint   Patient presents with    Anxiety    Insomnia     1. Have you been to the ER, urgent care clinic since your last visit? Hospitalized since your last visit? No     2. Have you seen or consulted any other health care providers outside of the [address] [place] [address] since your last visit? Include any pap smears or colon screening.  No     Visit Vitals  /56   Pulse 61   Temp 98.8 °F (37.1 °C) (Oral)   Resp 18   Ht 5' 8\" (1.727 m)   Wt 212 lb (96.2 kg)   SpO2 95%   BMI 32.23 kg/m²

## 2019-04-30 NOTE — PROGRESS NOTES
HISTORY OF PRESENT ILLNESS  Steve Keane is a 59 y.o. male. Pt. comes in with caregiver for f/u. Has multiple medical problems. Has history of traumatic brain injury with seizures. His memory has been getting worse. Follow a psychiatrist for bipolar disorder. Also has anxiety. Has chronic speech issues and cognitive deficit. Reports compliance with medications and diet. Med list and most recent labs/studies reviewed with pt. Trying to be active physically but losing weight. No tobacco or alcohol use. Reports no other new c/o. HPI    Review of Systems   Constitutional: Negative. HENT: Negative. Eyes: Negative. Respiratory: Negative for shortness of breath. Cardiovascular: Negative for chest pain and leg swelling. Gastrointestinal: Positive for heartburn. Negative for abdominal pain. Genitourinary: Negative for dysuria. Musculoskeletal: Negative for falls and joint pain. Skin: Negative. Neurological: Positive for seizures. Negative for dizziness, sensory change and headaches. Endo/Heme/Allergies: Negative. Psychiatric/Behavioral: Positive for memory loss. Negative for depression. The patient is nervous/anxious. The patient does not have insomnia. All other systems reviewed and are negative. Physical Exam   Constitutional: He appears well-developed and well-nourished. No distress. obese   HENT:   Head: Normocephalic and atraumatic. Mouth/Throat: Oropharynx is clear and moist.   Eyes: Conjunctivae are normal.   Poor vision   Neck: Normal range of motion. Neck supple. No JVD present. No thyromegaly present. Cardiovascular: Normal rate, regular rhythm, normal heart sounds and intact distal pulses. No murmur heard. Pulmonary/Chest: Effort normal and breath sounds normal. No respiratory distress. He has no wheezes. He has no rales. Abdominal: Soft. Bowel sounds are normal. He exhibits no distension. obese   Musculoskeletal: He exhibits no edema or tenderness. Neurological: He is alert. Coordination normal.   A+O to name, Wes, not date   Doesn't know president's name   Skin: Skin is warm and dry. No rash noted. Psychiatric: He has a normal mood and affect. His behavior is normal.   Slow speech  Chronic cognitive deficit   Nursing note and vitals reviewed. ASSESSMENT and PLAN  Diagnoses and all orders for this visit:    1. Essential hypertension    2. Memory impairment  -     MRI BRAIN W WO CONT; Future    3. History of traumatic brain injury  -     MRI BRAIN W WO CONT; Future    4. Mixed hyperlipidemia    5. Seizure disorder ([facility] [address]  -     MRI BRAIN W WO CONT; Future    6. Bipolar disorder in full remission, most recent episode unspecified type ([address]    7. Obesity (BMI 30.0-34. 9)      Follow-up and Dispositions    · Return in about 1 month (around 5/3/2019).      lab results and schedule of future lab studies reviewed with patient  reviewed diet, exercise and weight control  reviewed medications and side effects in detail  F/u with other MD's as scheduled

## 2019-05-06 ENCOUNTER — OFFICE VISIT (OUTPATIENT)
Dept: INTERNAL MEDICINE CLINIC | Age: 59
End: 2019-05-06

## 2019-05-06 VITALS
HEIGHT: 68 IN | WEIGHT: 208.6 LBS | RESPIRATION RATE: 20 BRPM | TEMPERATURE: 97.4 F | HEART RATE: 61 BPM | BODY MASS INDEX: 31.61 KG/M2 | DIASTOLIC BLOOD PRESSURE: 88 MMHG | SYSTOLIC BLOOD PRESSURE: 130 MMHG | OXYGEN SATURATION: 99 %

## 2019-05-06 DIAGNOSIS — F31.70 BIPOLAR DISORDER IN FULL REMISSION, MOST RECENT EPISODE UNSPECIFIED TYPE (HCC): ICD-10-CM

## 2019-05-06 DIAGNOSIS — E78.2 MIXED HYPERLIPIDEMIA: ICD-10-CM

## 2019-05-06 DIAGNOSIS — F79 INTELLECTUAL DISABILITY: ICD-10-CM

## 2019-05-06 DIAGNOSIS — R41.3 MEMORY IMPAIRMENT: Primary | ICD-10-CM

## 2019-05-06 DIAGNOSIS — E66.9 OBESITY (BMI 30.0-34.9): ICD-10-CM

## 2019-05-06 DIAGNOSIS — I10 ESSENTIAL HYPERTENSION: ICD-10-CM

## 2019-05-06 DIAGNOSIS — Z87.820 HISTORY OF TRAUMATIC BRAIN INJURY: ICD-10-CM

## 2019-05-06 RX ORDER — MEMANTINE HYDROCHLORIDE 10 MG/1
10 TABLET ORAL 2 TIMES DAILY
COMMUNITY

## 2019-05-06 RX ORDER — LISINOPRIL 40 MG/1
TABLET ORAL
Qty: 30 TAB | Status: SHIPPED | OUTPATIENT
Start: 2019-05-06 | End: 2020-05-01

## 2019-05-06 RX ORDER — AMLODIPINE BESYLATE 10 MG/1
TABLET ORAL
Qty: 30 TAB | Refills: 11 | Status: SHIPPED | OUTPATIENT
Start: 2019-05-06 | End: 2020-05-01

## 2019-05-06 NOTE — PROGRESS NOTES
HISTORY OF PRESENT ILLNESS Gregorio Duque is a 59 y.o. male. Pt. comes in with his  for f/u. Has multiple medical problems. Has history of traumatic brain injury and seizures. Followed by psychiatrist for bipolar disorder. Has intellectual disability. His memory has been gradually getting worse. Has been started on Namenda by psychiatrist.  His BP is stable. No tobacco or alcohol use. Works as a  in a hotel in town. Depends on others for some ADLs. Reports compliance with medications and diet. Med list and most recent labs/studies reviewed with pt. Trying to be active physically to control weight. Reports no other new c/o. HPI Review of Systems Constitutional: Negative. HENT: Negative. Eyes: Negative. Respiratory: Negative for shortness of breath. Cardiovascular: Negative for chest pain and leg swelling. Gastrointestinal: Positive for heartburn. Negative for abdominal pain. Genitourinary: Negative for dysuria. Musculoskeletal: Negative for falls and joint pain. Skin: Negative. Neurological: Positive for seizures. Negative for dizziness, sensory change and headaches. Endo/Heme/Allergies: Negative. Psychiatric/Behavioral: Positive for memory loss. Negative for depression. The patient is nervous/anxious. The patient does not have insomnia. All other systems reviewed and are negative. Physical Exam  
Constitutional: He appears well-developed and well-nourished. No distress. obese HENT:  
Head: Normocephalic and atraumatic. Mouth/Throat: Oropharynx is clear and moist.  
Eyes: Conjunctivae are normal.  
Poor vision Neck: Normal range of motion. Neck supple. No JVD present. No thyromegaly present. Cardiovascular: Normal rate, regular rhythm, normal heart sounds and intact distal pulses. No murmur heard. Pulmonary/Chest: Effort normal and breath sounds normal. No respiratory distress. He has no wheezes. He has no rales. Abdominal: Soft. Bowel sounds are normal. He exhibits no distension. obese Musculoskeletal: He exhibits no edema or tenderness. Neurological: He is alert. Coordination normal.  
A+O to name, Wes, not date Doesn't know president's name Skin: Skin is warm and dry. No rash noted. Psychiatric: He has a normal mood and affect. His behavior is normal.  
Slow speech Chronic cognitive deficit Nursing note and vitals reviewed. ASSESSMENT and PLAN Diagnoses and all orders for this visit: 
 
1. Memory impairment -     MRI BRAIN W WO CONT; Future 2. Essential hypertension 3. History of traumatic brain injury -     MRI BRAIN W WO CONT; Future 4. Mixed hyperlipidemia 5. Obesity (BMI 30.0-34.9) 6. Bipolar disorder in full remission, most recent episode unspecified type ([facility] [address] 7. Intellectual disability Follow-up and Dispositions · Return in about 4 months (around 9/6/2019). lab results and schedule of future lab studies reviewed with patient 
reviewed diet, exercise and weight control 
reviewed medications and side effects in detail F/u with other MD's as scheduled I will reorder brain MRI since he was not contacted by central scheduling last time

## 2019-05-06 NOTE — PROGRESS NOTES
Health Maintenance Due Topic Date Due  Shingrix Vaccine Age 50> (1 of 2) 10/22/2008  FOBT Q 1 YEAR AGE 50-75  04/07/2017 Chief Complaint Patient presents with  Hypertension 1 mo f/u  GERD  Seizure 1. Have you been to the ER, urgent care clinic since your last visit? Hospitalized since your last visit? No 
 
2. Have you seen or consulted any other health care providers outside of the [facility] since your last visit? Include any pap smears or colon screening. No 
 
3) Do you have an Advance Directive on file? no 
 
4) Are you interested in receiving information on Advance Directives? NO Patient is accompanied by  I have received verbal consent from Jonathan Tracey to discuss any/all medical information while they are present in the room.

## 2019-05-26 ENCOUNTER — HOSPITAL ENCOUNTER (OUTPATIENT)
Dept: MRI IMAGING | Age: 59
Discharge: HOME | End: 2019-05-26
Attending: INTERNAL MEDICINE
Payer: MEDICARE

## 2019-05-26 DIAGNOSIS — Z87.820 HISTORY OF TRAUMATIC BRAIN INJURY: ICD-10-CM

## 2019-05-26 DIAGNOSIS — R41.3 MEMORY IMPAIRMENT: ICD-10-CM

## 2019-05-26 PROCEDURE — A9575 INJ GADOTERATE MEGLUMI 0.1ML: HCPCS | Performed by: INTERNAL MEDICINE

## 2019-05-26 PROCEDURE — 70553 MRI BRAIN STEM W/O & W/DYE: CPT

## 2019-05-26 PROCEDURE — 74011250636 HC RX REV CODE- 250/636: Performed by: INTERNAL MEDICINE

## 2019-05-26 RX ORDER — SODIUM CHLORIDE 0.9 % (FLUSH) 0.9 %
10 SYRINGE (ML) INJECTION
Status: COMPLETED | OUTPATIENT
Start: 2019-05-26 | End: 2019-05-26

## 2019-05-26 RX ORDER — GADOTERATE MEGLUMINE 376.9 MG/ML
15 INJECTION INTRAVENOUS
Status: COMPLETED | OUTPATIENT
Start: 2019-05-26 | End: 2019-05-26

## 2019-05-26 RX ADMIN — GADOTERATE MEGLUMINE 15 ML: 376.9 INJECTION INTRAVENOUS at 13:07

## 2019-05-26 RX ADMIN — Medication 10 ML: at 13:00

## 2019-05-28 NOTE — PROGRESS NOTES
Chronic abnormalities seen brain MRI  No acute process  Refer to [facility] neurology clinic for further evaluation

## 2019-05-31 DIAGNOSIS — Z87.820 HISTORY OF TRAUMATIC BRAIN INJURY: Primary | ICD-10-CM

## 2019-05-31 NOTE — PROGRESS NOTES
Called and spoke with [facility] and informed them of the pt's MRI results. Informed them of the need for further evaluation and is being referred to [facility]. Referral has been entered and info given to Santa Susana to call and schedule his appt. No further questions at this time. Referral has been placed in the book in the front office. Encouraged to call with any further questions or concerns.

## 2019-07-10 ENCOUNTER — OFFICE VISIT (OUTPATIENT)
Dept: NEUROLOGY | Age: 59
End: 2019-07-10

## 2019-07-10 VITALS
HEART RATE: 65 BPM | HEIGHT: 68 IN | BODY MASS INDEX: 31.52 KG/M2 | SYSTOLIC BLOOD PRESSURE: 142 MMHG | DIASTOLIC BLOOD PRESSURE: 82 MMHG | WEIGHT: 208 LBS

## 2019-07-10 DIAGNOSIS — G40.109 TEMPORAL LOBE EPILEPSY WITH MESIAL TEMPORAL SCLEROSIS (HCC): Primary | ICD-10-CM

## 2019-07-10 DIAGNOSIS — R41.3 MEMORY LOSS DUE TO MEDICAL CONDITION: ICD-10-CM

## 2019-07-10 DIAGNOSIS — G93.81 TEMPORAL LOBE EPILEPSY WITH MESIAL TEMPORAL SCLEROSIS (HCC): Primary | ICD-10-CM

## 2019-07-10 NOTE — LETTER
7/10/2019 Patient:  Lorna Ledbetter YOB: 1960 Date of Visit: 7/10/2019 Dear Kaiden Gutierrez DO 
[address] [facility] [address] [address] VIA In Basket Jana Atkinson MD 
[address] [facility] [address] [address] VIA Facsimile: [phone] 
 : I was requested by Osiel Esparza DO to evaluate Mr. Padma Schaefer  for Chief Complaint Patient presents with  New Patient  
  short term injury memory Charmaine Arreguin I am recommending the following:  
 
Diagnoses and all orders for this visit: 1. 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 
-     PHENOBARBITAL 
-     EEG AWAKE AND ASLEEP; Future 2. Memory loss due to medical condition 
-     PHENOBARBITAL 
-     EEG AWAKE AND ASLEEP; Future 
 
 
 
---------------------------------------------------------------------------------------------------------------------- Below is my encounter: Chief Complaint Patient presents with  New Patient  
  short term injury memory Referred by: Dr. Jordan MALONEY Mr. Chow is a 57-year-old gentleman who lives in group home,independently, here for memory loss. He is here with his , [facility]. She has known him since 2008. He has history of psychiatric disease manifesting as bipolar disorder, history of epilepsy, history of brain injury and intellectual disability. The nature of his brain injury per  is something from birth but we do not know the details. 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 Overall his condition is stable. His last known seizure is unknown. His  tells me he is essentially his baseline. He can do his own ADLs such as hygiene, eating, dressing. 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 He does not drive.   His medications are administered to him. He does have supervision where he lives due to occasional wandering but no behavioral issues. He is followed by Dr. Bryant Hannon in psychiatry. Namenda had been started last year for memory loss. He is on phenobarbital at bedtime. Review of Systems Unable to perform ROS: Mental acuity Past Medical History:  
Diagnosis Date  Anxiety  Arthritis  Brain trauma ([facility] [address]   
 yuri kelly md psychiatry  Constipation  Glaucoma  Hypercholesterolemia  Hypertension  Intellectual disability   
 [facility]   
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   
 rp  abhay adriana -[phone]  S/P colonoscopy 4-6-12  
 rt n  
 S/P total hip arthroplasty   
 left  Seizures ([facility] Family History Problem Relation Age of Onset  No Known Problems Mother  No Known Problems Father Social History Socioeconomic History  Marital status: SINGLE Spouse name: Not on file  Number of children: Not on file  Years of education: Not on file  Highest education level: Not on file Occupational History  Not on file Social Needs  Financial resource strain: Not on file  Food insecurity:  
  Worry: Not on file Inability: Not on file  Transportation needs:  
  Medical: Not on file Non-medical: Not on file Tobacco Use  Smoking status: Never Smoker  Smokeless tobacco: Never Used Substance and Sexual Activity  Alcohol use: No  
  Alcohol/week: 0.0 oz  Drug use: No  
 Sexual activity: Never Lifestyle  Physical activity:  
  Days per week: Not on file Minutes per session: Not on file  Stress: Not on file Relationships  Social connections:  
  Talks on phone: Not on file Gets together: Not on file Attends [employer] service: Not on file Active member of club or organization: Not on file Attends meetings of clubs or organizations: Not on file Relationship status: Not on file  Intimate partner violence:  
  Fear of current or ex partner: Not on file Emotionally abused: Not on file Physically abused: Not on file Forced sexual activity: Not on file Other Topics Concern  Not on file Social History Narrative  Not on file Current Outpatient Medications Medication Sig  
 sod. chlorid/potassium chloride (SODIUM CL-POTASSIUM CHLORIDE PO) Take 10 mg by mouth.  amLODIPine (NORVASC) 10 mg tablet TAKE 1 TABLET BY MOUTH DAILY  hydrALAZINE (APRESOLINE) 100 mg tablet TAKE ONE (1) TABLET BY MOUTH THREE TIMES A DAY.  lisinopril (PRINIVIL, ZESTRIL) 40 mg tablet TAKE 1 TABLET BY MOUTH DAILY  memantine (NAMENDA) 10 mg tablet Take 10 mg by mouth two (2) times a day.  OLANZapine (ZYPREXA) 7.5 mg tablet Take 7.5 mg by mouth nightly.  cetirizine (ZYRTEC) 10 mg tablet TAKE 1 TABLET BY MOUTH DAILY  metoprolol succinate (TOPROL-XL) 50 mg XL tablet TAKE ONE TABLET BY MOUTH AT BEDTIME  fluticasone (FLONASE) 50 mcg/actuation nasal spray BLOW NOSE: 2 SPRAYS IN EACH NOSTRIL DAILY FOR ALLERGY.  mv-calcium-min-iron fm-FA-vitK (OPTISOURCE) chew tablet Take 1 Tab by mouth daily.  cloNIDine HCl (CATAPRES) 0.3 mg tablet TAKE 1 TABLET BY MOUTH TWICE A DAY  calcium phosphate-vitamin D3 (CITRACAL + D3, CALCIUM PHOS,) 250 mg calcium- 500 unit chew Take 1 Tab by mouth daily.  vit A/vit C/vit E/zinc/copper (PRESERVISION AREDS PO) Take  by mouth.  indapamide (LOZOL) 1.25 mg tablet TAKE 1 TABLET BY MOUTH DAILY  omeprazole (PRILOSEC) 20 mg capsule TAKE (1) CAPSULE BY MOUTH DAILY  erythromycin (ILOTYCIN) ophthalmic ointment Administer  to both eyes nightly.  latanoprost (XALATAN) 0.005 % ophthalmic solution  PHENobarbital (LUMINAL) 60 mg tablet Take 2 Tabs by mouth nightly. Max Daily Amount: 120 mg.  
 ketotifen (ALAWAY) 0.025 % ophthalmic solution Administer 1 Drop to both eyes two (2) times a day.  olopatadine (PATANOL) 0.1 % ophthalmic solution Administer 2 Drops to both eyes two (2) times a day.  alprazolam (XANAX) 0.25 mg tablet Take 0.125 mg by mouth two (2) times a day.  busPIRone (BUSPAR) 15 mg tablet Take 15 mg by mouth two (2) times a day.  omeprazole (PRILOSEC) 20 mg capsule TAKE (1) CAPSULE BY MOUTH DAILY  ONE DAILY MULTIVITAMIN tablet TAKE 1 TABLET BY MOUTH DAILY  potassium chloride SR (KLOR-CON 10) 10 mEq tablet TAKE 1 TABLET BY MOUTH DAILY  PHENobarbital (LUMINAL) 64.8 mg tablet 60 mg.  
 NAMENDA XR 28 mg capsule  olanzapine (ZYPREXA) 10 mg tablet Take 10 mg by mouth nightly. No current facility-administered medications for this visit. No Known Allergies Neurologic Exam  
 
Mental Status Age-appropriate elderly appearing gentleman. Good eye contact. Follows commands. Sometimes slow to answer questions. Friendly and smiling. Cranial Nerves Cranial nerves II through XII intact. Speech is slow and a little bit slurredbaseline per  Exam  
 
Strength Strength 5/5 throughout. Sensory Exam  
Light touch normal.  
 
Gait, Coordination, and Reflexes Gait Gait: normal 
 
Tremor Resting tremor: absent Physical Exam  
Constitutional: He appears well-developed and well-nourished. Cardiovascular: Normal rate. Pulmonary/Chest: Effort normal.  
Neurological: He has normal strength. Gait normal.  
Skin: Skin is warm and dry. Psychiatric: His behavior is normal.  
Vitals reviewed. Visit Vitals /82 Pulse 65 Ht 5' 8\" (1.727 m) Wt 94.3 kg (208 lb) BMI 31.63 kg/m² Lab Results Component Value Date/Time WBC 7.7 12/04/2017 08:26 AM  
 HGB 13.3 12/04/2017 08:26 AM  
 HCT 38.5 12/04/2017 08:26 AM  
 PLATELET 107 76/41/3983 08:26 AM  
 MCV 93 12/04/2017 08:26 AM  
 
Lab Results Component Value Date/Time  Hemoglobin A1c 5.3 10/02/2018 08:29 AM  
 Hemoglobin A1c 5.3 12/04/2017 08:26 AM  
 Hemoglobin A1c 5.7 (H) 11/01/2016 02:38 PM  
 Glucose 101 (H) 03/29/2019 08:13 AM  
 LDL, calculated 131 (H) 03/29/2019 08:13 AM  
 Creatinine 1.17 03/29/2019 08:13 AM  
  
Lab Results Component Value Date/Time Cholesterol, total 192 03/29/2019 08:13 AM  
 HDL Cholesterol 37 (L) 03/29/2019 08:13 AM  
 LDL, calculated 131 (H) 03/29/2019 08:13 AM  
 Triglyceride 119 03/29/2019 08:13 AM  
 
Lab Results Component Value Date/Time ALT (SGPT) 16 03/29/2019 08:13 AM  
 AST (SGOT) 23 03/29/2019 08:13 AM  
 Alk. phosphatase 63 12/04/2017 08:26 AM  
 Bilirubin, total 0.3 12/04/2017 08:26 AM  
 Albumin 3.9 12/04/2017 08:26 AM  
 Protein, total 6.8 12/04/2017 08:26 AM  
 INR 1.0 06/27/2013 01:50 PM  
 Prothrombin time 10.9 06/27/2013 01:50 PM  
 PLATELET 358 86/41/4049 08:26 AM  
  
 
CT Results (maximum last 3): No results found for this or any previous visit. MRI Results (maximum last 3): [facility] [place] [facility] [place] on [facility]26/19 MRI BRAIN W WO CONT Narrative EXAM:  MRI BRAIN W WO CONT INDICATION:    memory loss, h/o TBI 
 
COMPARISON:  None. CONTRAST: 15 ml Dotarem. TECHNIQUE:   
Multiplanar multisequence acquisition without and with contrast of the brain. FINDINGS: 
There is dysgenesis of the corpus callosum with essentially absence of the 
posterior body and splenium. The genu of the corpus callosum is also small. There is mild nonspecific T2/FLAIR white matter hyperintensity in the 
periventricular and deep white matter. Small left hippocampus with associated FLAIR hyperintensity (series 3 image 19). No hydrocephalus. No evidence of acute infarct. No definite chronic 
microhemorrhages are identified. There is no cerebellar tonsillar herniation. Expected arterial flow-voids are present. No evidence of abnormal enhancement. The paranasal sinuses, mastoid air cells, and middle ears are clear.  The orbital 
 contents are within normal limits. No significant osseous or scalp lesions are 
identified. Impression IMPRESSION:  
 
1. 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 
and splenium as described above. 2. Small left hippocampus with associated signal abnormality, favored to 
represent left mesial temporal sclerosis. 3. No acute intracranial abnormality. 23X PET Results (maximum last 3): No results found for this or any previous visit. Assessment and Plan Diagnoses and all orders for this visit: 1. 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 
-     PHENOBARBITAL 
-     EEG AWAKE AND ASLEEP; Future 2. Memory loss due to medical condition 
-     PHENOBARBITAL 
-     EEG AWAKE AND ASLEEP; Future 75-year-old gentleman with intellectual disability who also has epilepsy and psychiatric disease. Unclear if he had true brain injury or if all of his symptoms are organic from congenital malformation of the brain. The findings of the brain are likely congenital.  Today we need to check a phenobarbital level. Check EEG to make sure he is not having subclinical seizures potentially exacerbating and causing his memory loss to get worse. I do not feel strongly about continuing Namenda as I am not sure if it is going to be helpful with delaying progression of memory loss given that this most likely is not Alzheimer's. He is overall clinically stable per his . I will plan to see him in follow-up next year or sooner if anything changes. If the EEG has any discharges concerning we might adjust medication. Questions were answered in detail. We also talked about the abnormalities of the brain in detail using anatomical models. Thank you for giving me the opportunity to assist in the care of Mr. Isaiah Smith. If you have questions, please do not hesitate to contact me. Sincerely, 2 [facility], DO 
 Neurologist 
Brain Injury Medicine Diplomate ABPN

## 2019-07-10 NOTE — PROGRESS NOTES
Chief Complaint   Patient presents with    New Patient     short term injury memory       Referred by: Dr. Dulce Winkler      HPI    Mr. Angel Luz is a 78-year-old gentleman who lives in group home,independently, here for memory loss. He is here with his , Maureen Dickson. She has known him since 2008. He has history of psychiatric disease manifesting as bipolar disorder, history of epilepsy, history of brain injury and intellectual disability. The nature of his brain injury per  is something from birth but we do not know the details. 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 Overall his condition is stable. His last known seizure is unknown. His  tells me he is essentially his baseline. He can do his own ADLs such as hygiene, eating, dressing. 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 He does not drive. His medications are administered to him. He does have supervision where he lives due to occasional wandering but no behavioral issues. He is followed by Dr. Marilin Collins in psychiatry. Namenda had been started last year for memory loss. He is on phenobarbital at bedtime.       Review of Systems   Unable to perform ROS: Mental acuity       Past Medical History:   Diagnosis Date    Anxiety     Arthritis     Brain trauma ([facility] [address]     yuri kelly md psychiatry    Constipation     Glaucoma     Hypercholesterolemia     Hypertension     Intellectual disability     [facility]    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     rp  abhay walker -[phone]    S/P colonoscopy 4-6-12    rt n    S/P total hip arthroplasty     left    Seizures ([address]      Family History   Problem Relation Age of Onset    No Known Problems Mother     No Known Problems Father      Social History     Socioeconomic History    Marital status: SINGLE     Spouse name: Not on file    Number of children: Not on file    Years of education: Not on file    Highest education level: Not on file   Occupational History    Not on file   Social Needs    Financial resource strain: Not on file    Food insecurity:     Worry: Not on file     Inability: Not on file    Transportation needs:     Medical: Not on file     Non-medical: Not on file   Tobacco Use    Smoking status: Never Smoker    Smokeless tobacco: Never Used   Substance and Sexual Activity    Alcohol use: No     Alcohol/week: 0.0 oz    Drug use: No    Sexual activity: Never   Lifestyle    Physical activity:     Days per week: Not on file     Minutes per session: Not on file    Stress: Not on file   Relationships    Social connections:     Talks on phone: Not on file     Gets together: Not on file     Attends Yarsanism service: Not on file     Active member of club or organization: Not on file     Attends meetings of clubs or organizations: Not on file     Relationship status: Not on file    Intimate partner violence:     Fear of current or ex partner: Not on file     Emotionally abused: Not on file     Physically abused: Not on file     Forced sexual activity: Not on file   Other Topics Concern    Not on file   Social History Narrative    Not on file     Current Outpatient Medications   Medication Sig    sod. chlorid/potassium chloride (SODIUM CL-POTASSIUM CHLORIDE PO) Take 10 mg by mouth.  amLODIPine (NORVASC) 10 mg tablet TAKE 1 TABLET BY MOUTH DAILY    hydrALAZINE (APRESOLINE) 100 mg tablet TAKE ONE (1) TABLET BY MOUTH THREE TIMES A DAY.  lisinopril (PRINIVIL, ZESTRIL) 40 mg tablet TAKE 1 TABLET BY MOUTH DAILY    memantine (NAMENDA) 10 mg tablet Take 10 mg by mouth two (2) times a day.  OLANZapine (ZYPREXA) 7.5 mg tablet Take 7.5 mg by mouth nightly.     cetirizine (ZYRTEC) 10 mg tablet TAKE 1 TABLET BY MOUTH DAILY    metoprolol succinate (TOPROL-XL) 50 mg XL tablet TAKE ONE TABLET BY MOUTH AT BEDTIME    fluticasone (FLONASE) 50 mcg/actuation nasal spray BLOW NOSE: 2 SPRAYS IN EACH NOSTRIL DAILY FOR ALLERGY.  mv-calcium-min-iron fm-FA-vitK (OPTISOURCE) chew tablet Take 1 Tab by mouth daily.  cloNIDine HCl (CATAPRES) 0.3 mg tablet TAKE 1 TABLET BY MOUTH TWICE A DAY    calcium phosphate-vitamin D3 (CITRACAL + D3, CALCIUM PHOS,) 250 mg calcium- 500 unit chew Take 1 Tab by mouth daily.  vit A/vit C/vit E/zinc/copper (PRESERVISION AREDS PO) Take  by mouth.  indapamide (LOZOL) 1.25 mg tablet TAKE 1 TABLET BY MOUTH DAILY    omeprazole (PRILOSEC) 20 mg capsule TAKE (1) CAPSULE BY MOUTH DAILY    erythromycin (ILOTYCIN) ophthalmic ointment Administer  to both eyes nightly.  latanoprost (XALATAN) 0.005 % ophthalmic solution     PHENobarbital (LUMINAL) 60 mg tablet Take 2 Tabs by mouth nightly. Max Daily Amount: 120 mg.    ketotifen (ALAWAY) 0.025 % ophthalmic solution Administer 1 Drop to both eyes two (2) times a day.  olopatadine (PATANOL) 0.1 % ophthalmic solution Administer 2 Drops to both eyes two (2) times a day.  alprazolam (XANAX) 0.25 mg tablet Take 0.125 mg by mouth two (2) times a day.  busPIRone (BUSPAR) 15 mg tablet Take 15 mg by mouth two (2) times a day.  omeprazole (PRILOSEC) 20 mg capsule TAKE (1) CAPSULE BY MOUTH DAILY    ONE DAILY MULTIVITAMIN tablet TAKE 1 TABLET BY MOUTH DAILY    potassium chloride SR (KLOR-CON 10) 10 mEq tablet TAKE 1 TABLET BY MOUTH DAILY    PHENobarbital (LUMINAL) 64.8 mg tablet 60 mg.    NAMENDA XR 28 mg capsule     olanzapine (ZYPREXA) 10 mg tablet Take 10 mg by mouth nightly. No current facility-administered medications for this visit. No Known Allergies      Neurologic Exam     Mental Status   Age-appropriate elderly appearing gentleman. Good eye contact. Follows commands. Sometimes slow to answer questions. Friendly and smiling. Cranial Nerves   Cranial nerves II through XII intact.    Speech is slow and a little bit slurred-baseline per      Motor Exam     Strength   Strength 5/5 throughout. Sensory Exam   Light touch normal.     Gait, Coordination, and Reflexes     Gait  Gait: normal    Tremor   Resting tremor: absent    Physical Exam   Constitutional: He appears well-developed and well-nourished. Cardiovascular: Normal rate. Pulmonary/Chest: Effort normal.   Neurological: He has normal strength. Gait normal.   Skin: Skin is warm and dry. Psychiatric: His behavior is normal.   Vitals reviewed. Visit Vitals  /82   Pulse 65   Ht 5' 8\" (1.727 m)   Wt 94.3 kg (208 lb)   BMI 31.63 kg/m²       Lab Results   Component Value Date/Time    WBC 7.7 12/04/2017 08:26 AM    HGB 13.3 12/04/2017 08:26 AM    HCT 38.5 12/04/2017 08:26 AM    PLATELET 557 16/71/9658 08:26 AM    MCV 93 12/04/2017 08:26 AM     Lab Results   Component Value Date/Time    Hemoglobin A1c 5.3 10/02/2018 08:29 AM    Hemoglobin A1c 5.3 12/04/2017 08:26 AM    Hemoglobin A1c 5.7 (H) 11/01/2016 02:38 PM    Glucose 101 (H) 03/29/2019 08:13 AM    LDL, calculated 131 (H) 03/29/2019 08:13 AM    Creatinine 1.17 03/29/2019 08:13 AM      Lab Results   Component Value Date/Time    Cholesterol, total 192 03/29/2019 08:13 AM    HDL Cholesterol 37 (L) 03/29/2019 08:13 AM    LDL, calculated 131 (H) 03/29/2019 08:13 AM    Triglyceride 119 03/29/2019 08:13 AM     Lab Results   Component Value Date/Time    ALT (SGPT) 16 03/29/2019 08:13 AM    AST (SGOT) 23 03/29/2019 08:13 AM    Alk. phosphatase 63 12/04/2017 08:26 AM    Bilirubin, total 0.3 12/04/2017 08:26 AM    Albumin 3.9 12/04/2017 08:26 AM    Protein, total 6.8 12/04/2017 08:26 AM    INR 1.0 06/27/2013 01:50 PM    Prothrombin time 10.9 06/27/2013 01:50 PM    PLATELET 044 94/16/0145 08:26 AM        CT Results (maximum last 3): No results found for this or any previous visit. MRI Results (maximum last 3):   Results from [facility] encounter on 05/26/19   MRI BRAIN W WO CONT    Narrative EXAM: MRI BRAIN W WO CONT    INDICATION:    memory loss, h/o TBI    COMPARISON:  None. CONTRAST: 15 ml Dotarem. TECHNIQUE:    Multiplanar multisequence acquisition without and with contrast of the brain. FINDINGS:  There is dysgenesis of the corpus callosum with essentially absence of the  posterior body and splenium. The genu of the corpus callosum is also small. There is mild nonspecific T2/FLAIR white matter hyperintensity in the  periventricular and deep white matter. Small left hippocampus with associated FLAIR hyperintensity (series 3 image 19). No hydrocephalus. No evidence of acute infarct. No definite chronic  microhemorrhages are identified. There is no cerebellar tonsillar herniation. Expected arterial flow-voids are present. No evidence of abnormal enhancement. The paranasal sinuses, mastoid air cells, and middle ears are clear. The orbital  contents are within normal limits. No significant osseous or scalp lesions are  identified. Impression IMPRESSION:     1. 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 2. Small left hippocampus with associated signal abnormality, favored to  represent left mesial temporal sclerosis. 3. No acute intracranial abnormality. 23X           PET Results (maximum last 3): No results found for this or any previous visit. Assessment and Plan   Diagnoses and all orders for this visit:    1. 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 Memory loss due to medical condition  -     PHENOBARBITAL  -     EEG AWAKE AND ASLEEP; Future      78-year-old gentleman with intellectual disability who also has epilepsy and psychiatric disease. Unclear if he had true brain injury or if all of his symptoms are organic from congenital malformation of the brain.   The findings of the brain are likely congenital.  Today we need to check a phenobarbital level. Check EEG to make sure he is not having subclinical seizures potentially exacerbating and causing his memory loss to get worse. I do not feel strongly about continuing Namenda as I am not sure if it is going to be helpful with delaying progression of memory loss given that this most likely is not Alzheimer's. He is overall clinically stable per his . I will plan to see him in follow-up next year or sooner if anything changes. If the EEG has any discharges concerning we might adjust medication. Questions were answered in detail. We also talked about the abnormalities of the brain in detail using anatomical models. I reviewed and decided to continue the current medications. A notice of this visit/encounter being completed has been sent electronically to the patient's PCP and/or referring provider.      [address] [place] [address], [facility] [address]  Diplomate NIK

## 2019-07-10 NOTE — PATIENT INSTRUCTIONS

## 2019-07-11 LAB — PHENOBARB SERPL-MCNC: 22 UG/ML (ref 15–40)

## 2019-07-26 ENCOUNTER — HOSPITAL ENCOUNTER (OUTPATIENT)
Dept: NEUROLOGY | Age: 59
Discharge: HOME | End: 2019-07-26
Attending: PSYCHIATRY & NEUROLOGY
Payer: MEDICARE

## 2019-07-26 DIAGNOSIS — R41.3 MEMORY LOSS DUE TO MEDICAL CONDITION: ICD-10-CM

## 2019-07-26 DIAGNOSIS — G40.109 TEMPORAL LOBE EPILEPSY WITH MESIAL TEMPORAL SCLEROSIS (HCC): ICD-10-CM

## 2019-07-26 DIAGNOSIS — G40.909 SEIZURE DISORDER (HCC): ICD-10-CM

## 2019-07-26 DIAGNOSIS — G93.81 TEMPORAL LOBE EPILEPSY WITH MESIAL TEMPORAL SCLEROSIS (HCC): ICD-10-CM

## 2019-07-26 PROCEDURE — 95819 EEG AWAKE AND ASLEEP: CPT

## 2019-07-28 NOTE — PROCEDURES
[facility][note 3]  EEG    Name:  Nicolette Simons  MR#:  [id number]  :  1960  ACCOUNT #:  [de-identified]  DATE OF SERVICE:  2019      REQUESTING PHYSICIAN:  Dr. Suzanna Toledo. HISTORY:  The patient is a 58-year-old male who is being evaluated for history of seizure disorder and to rule out subclinical seizures. DESCRIPTION:  This is an 18-channel EEG performed on an awake patient. The dominant posterior background rhythm consists of symmetric, fairly well-modulated medium voltage rhythms in the 8-9 Hz frequency range. Faster beta frequencies were seen in the frontal head region. Drowsiness and sleep states are not recorded. Photic stimulation elicits a symmetric driving response. Hyperventilation was not performed. EEG SUMMARY:  Normal study. CLINICAL INTERPRETATION:  This was a normal EEG during wakeful state of the patient. No lateralizing or epileptiform features were noted.         Andry Mello MD      [id number]  D:  2019 17:46  T:  2019 21:40  JOB #:  [id number]

## 2019-08-21 ENCOUNTER — TELEPHONE (OUTPATIENT)
Dept: INTERNAL MEDICINE CLINIC | Age: 59
End: 2019-08-21

## 2019-08-23 ENCOUNTER — TELEPHONE (OUTPATIENT)
Dept: INTERNAL MEDICINE CLINIC | Age: 59
End: 2019-08-23

## 2019-08-26 NOTE — TELEPHONE ENCOUNTER
Call placed to Damien Smith and she stated that 8/21/2019 patient did not receive 7 of his medications, she was unable to provide list of medications due to not at location but states as soon as she gets back to the group home she will fax over a list, states no adverse reaction or concern noted from patient missing his medication x 1 on 8/21/2019 but will continue to monitor

## 2019-08-29 RX ORDER — HYDRALAZINE HYDROCHLORIDE 100 MG/1
TABLET, FILM COATED ORAL
Qty: 93 TAB | Refills: 0 | Status: SHIPPED | OUTPATIENT
Start: 2019-08-29 | End: 2019-09-30 | Stop reason: SDUPTHER

## 2019-09-17 ENCOUNTER — OFFICE VISIT (OUTPATIENT)
Dept: INTERNAL MEDICINE CLINIC | Age: 59
End: 2019-09-17

## 2019-09-17 VITALS
TEMPERATURE: 97.6 F | WEIGHT: 209.6 LBS | SYSTOLIC BLOOD PRESSURE: 120 MMHG | BODY MASS INDEX: 31.77 KG/M2 | DIASTOLIC BLOOD PRESSURE: 76 MMHG | RESPIRATION RATE: 16 BRPM | OXYGEN SATURATION: 97 % | HEART RATE: 60 BPM | HEIGHT: 68 IN

## 2019-09-17 DIAGNOSIS — R90.89 ABNORMAL BRAIN MRI: ICD-10-CM

## 2019-09-17 DIAGNOSIS — I10 ESSENTIAL HYPERTENSION: Primary | ICD-10-CM

## 2019-09-17 DIAGNOSIS — R41.3 MEMORY IMPAIRMENT: ICD-10-CM

## 2019-09-17 DIAGNOSIS — G40.909 SEIZURE DISORDER (HCC): ICD-10-CM

## 2019-09-17 DIAGNOSIS — F79 INTELLECTUAL DISABILITY: ICD-10-CM

## 2019-09-17 DIAGNOSIS — Z87.820 HISTORY OF TRAUMATIC BRAIN INJURY: ICD-10-CM

## 2019-09-17 DIAGNOSIS — F31.70 BIPOLAR DISORDER IN FULL REMISSION, MOST RECENT EPISODE UNSPECIFIED TYPE (HCC): ICD-10-CM

## 2019-09-17 DIAGNOSIS — R13.19 ESOPHAGEAL DYSPHAGIA: ICD-10-CM

## 2019-09-17 DIAGNOSIS — E66.9 OBESITY (BMI 30.0-34.9): ICD-10-CM

## 2019-09-17 RX ORDER — CYCLOSPORINE 0.5 MG/ML
1 EMULSION OPHTHALMIC EVERY 12 HOURS
COMMUNITY

## 2019-09-17 RX ORDER — GUAIFENESIN/DEXTROMETHORPHAN 100-5 MG/5
LIQUID (ML) ORAL
COMMUNITY
End: 2020-09-29 | Stop reason: ALTCHOICE

## 2019-09-17 RX ORDER — AZELASTINE HYDROCHLORIDE 0.5 MG/ML
1 SOLUTION/ DROPS OPHTHALMIC EVERY EVENING
COMMUNITY

## 2019-09-17 NOTE — PROGRESS NOTES
Gemma Keane is a 59 y.o. male     Chief Complaint   Patient presents with    Cholesterol Problem     follow up    Hypertension     follow up       Visit Vitals  /76 (BP 1 Location: Left arm, BP Patient Position: Sitting)   Pulse 60   Temp 97.6 °F (36.4 °C) (Oral)   Resp 16   Ht 5' 8\" (1.727 m)   Wt 209 lb 9.6 oz (95.1 kg)   SpO2 97%   BMI 31.87 kg/m²       Health Maintenance Due   Topic Date Due    Shingrix Vaccine Age 50> (1 of 2) 10/22/2008    FOBT Q 1 YEAR AGE 50-75  04/07/2017       1. Have you been to the ER, urgent care clinic since your last visit? Hospitalized since your last visit? No    2. Have you seen or consulted any other health care providers outside of the [address] [place] [address] since your last visit? Include any pap smears or colon screening.  No

## 2019-09-30 NOTE — PROGRESS NOTES
HISTORY OF PRESENT ILLNESS  Renu Landis is a 59 y.o. male. Pt. comes in for f/u. Has multiple medical problems. BP is stable. Has some cognitive deficit and memory impairment with history of TBI. No recent seizures. Followed by psychiatrist for bipolar disorder. Medications help. Reports continued blood with swallowing. Denies tobacco or alcohol use. Reports compliance with medications and diet. Med list and most recent labs/studies reviewed with pt. Trying to be active physically to control weight. Reports no other new c/o. HPI    Review of Systems   Constitutional: Negative. HENT: Negative. Eyes: Negative. Respiratory: Negative for shortness of breath. Cardiovascular: Negative for chest pain and leg swelling. Gastrointestinal: Positive for heartburn. Negative for abdominal pain, nausea and vomiting. Dysphagia   Genitourinary: Negative for dysuria. Musculoskeletal: Negative for falls and joint pain. Skin: Negative. Neurological: Positive for seizures. Negative for dizziness, sensory change and headaches. Endo/Heme/Allergies: Negative. Psychiatric/Behavioral: Positive for memory loss. Negative for depression. The patient is nervous/anxious. The patient does not have insomnia. All other systems reviewed and are negative. Physical Exam   Constitutional: He appears well-developed and well-nourished. No distress. obese   HENT:   Head: Normocephalic and atraumatic. Mouth/Throat: Oropharynx is clear and moist.   Eyes: Conjunctivae are normal. No scleral icterus. Poor vision   Neck: Normal range of motion. Neck supple. No JVD present. No thyromegaly present. Cardiovascular: Normal rate, regular rhythm, normal heart sounds and intact distal pulses. No murmur heard. Pulmonary/Chest: Effort normal and breath sounds normal. No respiratory distress. He has no wheezes. He has no rales. Abdominal: Soft. Bowel sounds are normal. He exhibits no distension.  There is tenderness. obese   Musculoskeletal: He exhibits no edema or tenderness. Neurological: He is alert. Coordination normal.   A+O to name, Wes, not date   Doesn't know president's name   Skin: Skin is warm and dry. No rash noted. Psychiatric: He has a normal mood and affect. His behavior is normal.   Slow speech  Chronic cognitive deficit   Nursing note and vitals reviewed. ASSESSMENT and PLAN  Diagnoses and all orders for this visit:    1. Essential hypertension    2. History of traumatic brain injury    3. Obesity (BMI 30.0-34.9)    4. Memory impairment    5. Intellectual disability    6. Bipolar disorder in full remission, most recent episode unspecified type ([facility] [address]    7. Seizure disorder ([facility] [address]    8. Abnormal brain MRI    9. Esophageal dysphagia  -     REFERRAL TO GASTROENTEROLOGY      Follow-up and Dispositions    · Return in about 4 months (around 1/17/2020).      lab results and schedule of future lab studies reviewed with patient  reviewed diet, exercise and weight control  reviewed medications and side effects in detail  F/u with other MD's as scheduled  Reassurance  Overall stable

## 2019-10-22 ENCOUNTER — OFFICE VISIT (OUTPATIENT)
Dept: INTERNAL MEDICINE CLINIC | Age: 59
End: 2019-10-22

## 2019-10-22 VITALS
SYSTOLIC BLOOD PRESSURE: 130 MMHG | TEMPERATURE: 98.3 F | OXYGEN SATURATION: 96 % | DIASTOLIC BLOOD PRESSURE: 70 MMHG | BODY MASS INDEX: 31.22 KG/M2 | HEART RATE: 64 BPM | WEIGHT: 206 LBS | RESPIRATION RATE: 18 BRPM | HEIGHT: 68 IN

## 2019-10-22 DIAGNOSIS — I10 ESSENTIAL HYPERTENSION: Primary | ICD-10-CM

## 2019-10-22 DIAGNOSIS — Z00.00 MEDICARE ANNUAL WELLNESS VISIT, SUBSEQUENT: ICD-10-CM

## 2019-10-22 DIAGNOSIS — E78.2 MIXED HYPERLIPIDEMIA: ICD-10-CM

## 2019-10-22 DIAGNOSIS — Z87.820 HISTORY OF TRAUMATIC BRAIN INJURY: ICD-10-CM

## 2019-10-22 DIAGNOSIS — G40.909 SEIZURE DISORDER (HCC): ICD-10-CM

## 2019-10-22 DIAGNOSIS — R41.3 MEMORY IMPAIRMENT: ICD-10-CM

## 2019-10-22 DIAGNOSIS — Z96.642 STATUS POST LEFT HIP REPLACEMENT: ICD-10-CM

## 2019-10-22 DIAGNOSIS — E66.9 OBESITY (BMI 30.0-34.9): ICD-10-CM

## 2019-10-22 DIAGNOSIS — F31.70 BIPOLAR DISORDER IN FULL REMISSION, MOST RECENT EPISODE UNSPECIFIED TYPE (HCC): ICD-10-CM

## 2019-10-22 NOTE — PROGRESS NOTES
This is the Subsequent Medicare Annual Wellness Exam, performed 12 months or more after the Initial AWV or the last Subsequent AWV    I have reviewed the patient's medical history in detail and updated the computerized patient record. History     Past Medical History:   Diagnosis Date    Anxiety     Arthritis     Brain trauma ([facility] [address]     yuri kelly md psychiatry    Constipation     Glaucoma     Hypercholesterolemia     Hypertension     Intellectual disability     [facility] nelson luna [facility]    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     rp  abhay walker -[phone]    S/P colonoscopy 4-6-12    rt n    S/P total hip arthroplasty     left    Seizures ([facility] [address]       History reviewed. No pertinent surgical history. Current Outpatient Medications   Medication Sig Dispense Refill    indapamide (LOZOL) 1.25 mg tablet TAKE 1 TABLET BY MOUTH DAILY 30 Tab 5    hydrALAZINE (APRESOLINE) 100 mg tablet TAKE ONE (1) TABLET BY MOUTH THREE TIMES A DAY. 90 Tab 3    azelastine (OPTIVAR) 0.05 % ophthalmic solution Administer 1 Drop to both eyes every evening. Use in affected eye(s)      miconazole nitrate (LOTRIMIN AF JOCK ITCH POWDER) 2 % aerp by Apply Externally route.  cycloSPORINE (RESTASIS) 0.05 % dpet Administer 1 Drop to both eyes every twelve (12) hours.  multivit with calcium,iron,min (TAB A JOANNE PO) Take 1 Tab by mouth daily.  cetirizine (ZYRTEC) 10 mg tablet TAKE 1 TABLET BY MOUTH DAILY 31 Tab 3    amLODIPine (NORVASC) 10 mg tablet TAKE 1 TABLET BY MOUTH DAILY 30 Tab 11    lisinopril (PRINIVIL, ZESTRIL) 40 mg tablet TAKE 1 TABLET BY MOUTH DAILY 30 Tab PRN    memantine (NAMENDA) 10 mg tablet Take 10 mg by mouth two (2) times a day.  OLANZapine (ZYPREXA) 7.5 mg tablet Take 7.5 mg by mouth nightly.       potassium chloride SR (KLOR-CON 10) 10 mEq tablet TAKE 1 TABLET BY MOUTH DAILY 28 Tab PRN    metoprolol succinate (TOPROL-XL) 50 mg XL tablet TAKE ONE TABLET BY MOUTH AT BEDTIME 31 Tab PRN    fluticasone (FLONASE) 50 mcg/actuation nasal spray BLOW NOSE: 2 SPRAYS IN EACH NOSTRIL DAILY FOR ALLERGY. 16 g 11    cloNIDine HCl (CATAPRES) 0.3 mg tablet TAKE 1 TABLET BY MOUTH TWICE A DAY 62 Tab PRN    vit A/vit C/vit E/zinc/copper (PRESERVISION AREDS PO) Take  by mouth.  omeprazole (PRILOSEC) 20 mg capsule TAKE (1) CAPSULE BY MOUTH DAILY 31 Cap 10    erythromycin (ILOTYCIN) ophthalmic ointment Administer  to both eyes nightly.  latanoprost (XALATAN) 0.005 % ophthalmic solution       PHENobarbital (LUMINAL) 60 mg tablet Take 2 Tabs by mouth nightly. Max Daily Amount: 120 mg. 60 Tab 5    alprazolam (XANAX) 0.25 mg tablet Take 0.125 mg by mouth two (2) times a day.  busPIRone (BUSPAR) 15 mg tablet Take 15 mg by mouth two (2) times a day.  ONE DAILY MULTIVITAMIN tablet TAKE 1 TABLET BY MOUTH DAILY 30 Tab PRN    calcium phosphate-vitamin D3 (CITRACAL + D3, CALCIUM PHOS,) 250 mg calcium- 500 unit chew Take 1 Tab by mouth daily. 90 Tab 1     No Known Allergies  Family History   Problem Relation Age of Onset    No Known Problems Mother     No Known Problems Father      Social History     Tobacco Use    Smoking status: Never Smoker    Smokeless tobacco: Never Used   Substance Use Topics    Alcohol use: No     Alcohol/week: 0.0 standard drinks     Patient Active Problem List   Diagnosis Code    Hip arthritis M16.10    Seizure disorder ([facility] [address] G40.909    Seizures ([facility] [address] R56.9   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 F70    Hypertension I10    Intellectual disability F68    ACP (advance care planning) Z71.89    DOROTA (generalized anxiety disorder) F41.1    Dementia with behavioral disturbance (HCC) F03.91    Prediabetes R73.03    Obesity (BMI 30.0-34. 9) E66.9    Mixed hyperlipidemia E78.2    Memory impairment R41.3    Bipolar disorder in full remission ([facility] [address] F31.70    Hyperglycemia R73.9    Esophageal dysphagia R13.10    Gastroesophageal reflux disease without esophagitis K21.9    History of traumatic brain injury Z87.820    Abnormal brain MRI R90.89    Status post left hip replacement Z96.642       Depression Risk Factor Screening:     3 most recent PHQ Screens 7/7/2017   Little interest or pleasure in doing things Not at all   Feeling down, depressed, irritable, or hopeless Not at all   Total Score PHQ 2 0     Alcohol Risk Factor Screening: You do not drink alcohol or very rarely. Functional Ability and Level of Safety:   Hearing Loss  Hearing is good. Activities of Daily Living  The home contains: no safety equipment. Patient needs help with:  phone, transportation, shopping, preparing meals, laundry, housework, managing medications, managing money, dressing and bathing    Fall Risk  Fall Risk Assessment, last 12 mths 7/7/2017   Able to walk? Yes   Fall in past 12 months? No       Abuse Screen  Patient is not abused    Cognitive Screening   Evaluation of Cognitive Function:  Has your family/caregiver stated any concerns about your memory: yes  Normal, Abnormal    Patient Care Team   Patient Care Team:  Terri Boyle DO as PCP - General (Internal Medicine)  Jennifer Resendiz MD as Physician (Ophthalmology)    Assessment/Plan   Education and counseling provided:  Are appropriate based on today's review and evaluation    Diagnoses and all orders for this visit:    1. Essential hypertension    2. Seizure disorder ([address]    3. Mixed hyperlipidemia    4. Obesity (BMI 30.0-34.9)    5. History of traumatic brain injury    6. Memory impairment    7. Bipolar disorder in full remission, most recent episode unspecified type ([address]    8.  Status post left hip replacement        Health Maintenance Due   Topic Date Due    Shingrix Vaccine Age 49> (1 of 2) 10/22/2008

## 2019-10-22 NOTE — PROGRESS NOTES
Health Maintenance Due   Topic Date Due    Shingrix Vaccine Age 49> (1 of 2) 10/22/2008    MEDICARE YEARLY EXAM  10/25/2019       Chief Complaint   Patient presents with    Hypertension     Annual visit    GERD    Cholesterol Problem    Annual Wellness Visit       1. Have you been to the ER, urgent care clinic since your last visit? Hospitalized since your last visit? No    2. Have you seen or consulted any other health care providers outside of the [address] [place] [address] since your last visit? Include any pap smears or colon screening. No    3) Do you have an Advance Directive on file? no    4) Are you interested in receiving information on Advance Directives? NO      Patient is accompanied by self and  I have received verbal consent from Jose Coello to discuss any/all medical information while they are present in the room. 149.86

## 2019-10-22 NOTE — PROGRESS NOTES
Schedule of Personalized Health Plan  (Provide Copy to Patient)  The best way to stay healthy is to live a healthy lifestyle. A healthy lifestyle includes regular exercise, eating a well-balanced diet, keeping a healthy weight and not smoking. Regular physical exams and screening tests are another important way to take care of yourself. Preventive exams provided by health care providers can find health problems early when treatment works best and can keep you from getting certain diseases or illnesses. Preventive services include exams, lab tests, screenings, shots, monitoring and information to help you take care of your own health. All people over 65 should have a pneumonia shot. Pneumonia shots are usually only needed once in a lifetime unless your doctor decides differently. All people over 65 should have a yearly flu shot. People over 65 are at medium to high risk for Hepatitis B. Three shots are needed for complete protection. In addition to your physical exam, some screening tests are recommended:    Bone mass measurement (dexa scan) is recommended every two years if you have certain risk factors, such as personal history of vertebral fracture or chronic steroid medication use    Diabetes Mellitus screening is recommended every year. Glaucoma is an eye disease caused by high pressure in the eye. An eye exam is recommended every year. Cardiovascular screening tests that check your cholesterol and other blood fat (lipid) levels are recommended every five years. Colorectal Cancer screening tests help to find pre-cancerous polyps (growths in the colon) so they can be removed before they turn into cancer. Tests ordered for screening depend on your personal and family history risk factors.     Screening for Prostate Cancer is recommended yearly with a digital rectal exam and/or a PSA test    Here is a list of your current Health Maintenance items with a due date:  Health Maintenance Topic Date Due    Shingrix Vaccine Age 49> (1 of 2) 10/22/2008    Influenza Age 5 to Adult  10/31/2019 (Originally 8/1/2019)    FOBT Q 1 YEAR AGE 50-75  11/17/2019 (Originally 4/7/2017)    MEDICARE YEARLY EXAM  10/22/2020    DTaP/Tdap/Td series (2 - Td) 05/20/2026    Hepatitis C Screening  Addressed    Pneumococcal 0-64 years  Aged Out

## 2019-10-31 NOTE — PROGRESS NOTES
HISTORY OF PRESENT ILLNESS  Thi Hoover is a 62 y.o. male. Pt. comes in with his  for f/u. Has multiple medical problems. BP is stable. Has some cognitive deficit and memory impairment with history of TBI. No recent seizures. Followed by psychiatrist for bipolar disorder. Medications help. Reports continued blood with swallowing. Denies tobacco or alcohol use. Reports compliance with medications and diet. Med list and most recent labs/studies reviewed with pt. Trying to be active physically to control weight. Reports no other new c/o. Hypertension    Pertinent negatives include no chest pain, no headaches, no dizziness, no shortness of breath, no nausea and no vomiting. GERD   Pertinent negatives include no chest pain, no abdominal pain, no headaches and no shortness of breath. Cholesterol Problem   Pertinent negatives include no chest pain, no abdominal pain, no headaches and no shortness of breath. Review of Systems   Constitutional: Negative. HENT: Negative. Eyes: Negative. Respiratory: Negative for shortness of breath. Cardiovascular: Negative for chest pain and leg swelling. Gastrointestinal: Positive for heartburn. Negative for abdominal pain, nausea and vomiting. Genitourinary: Negative for dysuria. Musculoskeletal: Negative for falls and joint pain. Skin: Negative. Neurological: Positive for seizures. Negative for dizziness, sensory change and headaches. Endo/Heme/Allergies: Negative. Psychiatric/Behavioral: Positive for memory loss. Negative for depression. The patient is nervous/anxious. The patient does not have insomnia. All other systems reviewed and are negative. Physical Exam   Constitutional: He appears well-developed and well-nourished. No distress. obese   HENT:   Head: Normocephalic and atraumatic. Mouth/Throat: Oropharynx is clear and moist.   Eyes: Conjunctivae are normal. No scleral icterus.    Poor vision   Neck: Normal range of motion. Neck supple. No JVD present. No thyromegaly present. Cardiovascular: Normal rate, regular rhythm, normal heart sounds and intact distal pulses. No murmur heard. Pulmonary/Chest: Effort normal and breath sounds normal. No respiratory distress. He has no wheezes. He has no rales. Abdominal: Soft. Bowel sounds are normal. He exhibits no distension. There is tenderness. obese   Musculoskeletal: He exhibits no edema or tenderness. Neurological: He is alert. Coordination normal.   A+O to name, Wes, not date   Doesn't know president's name   Skin: Skin is warm and dry. No rash noted. Psychiatric: He has a normal mood and affect. His behavior is normal.   Slow speech  Chronic cognitive deficit   Nursing note and vitals reviewed. ASSESSMENT and PLAN  Diagnoses and all orders for this visit:    1. Essential hypertension    2. Seizure disorder ([address]    3. Mixed hyperlipidemia    4. Obesity (BMI 30.0-34.9)    5. History of traumatic brain injury    6. Memory impairment    7. Bipolar disorder in full remission, most recent episode unspecified type ([address]    8. Status post left hip replacement    9. Medicare annual wellness visit, subsequent      Follow-up and Dispositions    · Return in about 4 months (around 2/22/2020).      lab results and schedule of future lab studies reviewed with patient  reviewed diet, exercise and weight control  reviewed medications and side effects in detail  F/u with other MD's as scheduled  Reassurance  Overall stable

## 2019-12-10 ENCOUNTER — HOSPITAL ENCOUNTER (OUTPATIENT)
Age: 59
Setting detail: OUTPATIENT SURGERY
Discharge: HOME | End: 2019-12-10
Attending: INTERNAL MEDICINE | Admitting: INTERNAL MEDICINE
Payer: MEDICARE

## 2019-12-10 ENCOUNTER — ANESTHESIA (OUTPATIENT)
Dept: ENDOSCOPY | Age: 59
End: 2019-12-10
Payer: MEDICARE

## 2019-12-10 ENCOUNTER — ANESTHESIA EVENT (OUTPATIENT)
Dept: ENDOSCOPY | Age: 59
End: 2019-12-10
Payer: MEDICARE

## 2019-12-10 VITALS
HEIGHT: 70 IN | OXYGEN SATURATION: 97 % | RESPIRATION RATE: 9 BRPM | HEART RATE: 63 BPM | TEMPERATURE: 97.9 F | SYSTOLIC BLOOD PRESSURE: 128 MMHG | WEIGHT: 199.9 LBS | DIASTOLIC BLOOD PRESSURE: 72 MMHG | BODY MASS INDEX: 28.62 KG/M2

## 2019-12-10 PROCEDURE — 77030029384 HC SNR POLYP CAPTVR II BSC -B: Performed by: INTERNAL MEDICINE

## 2019-12-10 PROCEDURE — 74011250636 HC RX REV CODE- 250/636: Performed by: NURSE ANESTHETIST, CERTIFIED REGISTERED

## 2019-12-10 PROCEDURE — 74011000250 HC RX REV CODE- 250: Performed by: NURSE ANESTHETIST, CERTIFIED REGISTERED

## 2019-12-10 PROCEDURE — 88305 TISSUE EXAM BY PATHOLOGIST: CPT

## 2019-12-10 PROCEDURE — 76040000007: Performed by: INTERNAL MEDICINE

## 2019-12-10 PROCEDURE — 88342 IMHCHEM/IMCYTCHM 1ST ANTB: CPT

## 2019-12-10 PROCEDURE — 77030021593 HC FCPS BIOP ENDOSC BSC -A: Performed by: INTERNAL MEDICINE

## 2019-12-10 PROCEDURE — 76060000032 HC ANESTHESIA 0.5 TO 1 HR: Performed by: INTERNAL MEDICINE

## 2019-12-10 RX ORDER — SODIUM CHLORIDE 0.9 % (FLUSH) 0.9 %
5-40 SYRINGE (ML) INJECTION AS NEEDED
Status: DISCONTINUED | OUTPATIENT
Start: 2019-12-10 | End: 2019-12-10 | Stop reason: HOSPADM

## 2019-12-10 RX ORDER — MIDAZOLAM HYDROCHLORIDE 1 MG/ML
.25-5 INJECTION, SOLUTION INTRAMUSCULAR; INTRAVENOUS
Status: DISCONTINUED | OUTPATIENT
Start: 2019-12-10 | End: 2019-12-10 | Stop reason: HOSPADM

## 2019-12-10 RX ORDER — SODIUM CHLORIDE 9 MG/ML
INJECTION, SOLUTION INTRAVENOUS
Status: DISCONTINUED | OUTPATIENT
Start: 2019-12-10 | End: 2019-12-10 | Stop reason: HOSPADM

## 2019-12-10 RX ORDER — PHENYLEPHRINE HCL IN 0.9% NACL 0.4MG/10ML
SYRINGE (ML) INTRAVENOUS AS NEEDED
Status: DISCONTINUED | OUTPATIENT
Start: 2019-12-10 | End: 2019-12-10 | Stop reason: HOSPADM

## 2019-12-10 RX ORDER — PROPOFOL 10 MG/ML
INJECTION, EMULSION INTRAVENOUS AS NEEDED
Status: DISCONTINUED | OUTPATIENT
Start: 2019-12-10 | End: 2019-12-10 | Stop reason: HOSPADM

## 2019-12-10 RX ORDER — SODIUM CHLORIDE 0.9 % (FLUSH) 0.9 %
5-40 SYRINGE (ML) INJECTION EVERY 8 HOURS
Status: DISCONTINUED | OUTPATIENT
Start: 2019-12-10 | End: 2019-12-10 | Stop reason: HOSPADM

## 2019-12-10 RX ORDER — EPINEPHRINE 0.1 MG/ML
1 INJECTION INTRACARDIAC; INTRAVENOUS
Status: DISCONTINUED | OUTPATIENT
Start: 2019-12-10 | End: 2019-12-10 | Stop reason: HOSPADM

## 2019-12-10 RX ORDER — SODIUM CHLORIDE 9 MG/ML
150 INJECTION, SOLUTION INTRAVENOUS CONTINUOUS
Status: DISCONTINUED | OUTPATIENT
Start: 2019-12-10 | End: 2019-12-10 | Stop reason: HOSPADM

## 2019-12-10 RX ORDER — LIDOCAINE HYDROCHLORIDE 20 MG/ML
INJECTION, SOLUTION EPIDURAL; INFILTRATION; INTRACAUDAL; PERINEURAL AS NEEDED
Status: DISCONTINUED | OUTPATIENT
Start: 2019-12-10 | End: 2019-12-10 | Stop reason: HOSPADM

## 2019-12-10 RX ORDER — FENTANYL CITRATE 50 UG/ML
200 INJECTION, SOLUTION INTRAMUSCULAR; INTRAVENOUS
Status: DISCONTINUED | OUTPATIENT
Start: 2019-12-10 | End: 2019-12-10 | Stop reason: HOSPADM

## 2019-12-10 RX ORDER — DEXTROMETHORPHAN/PSEUDOEPHED 2.5-7.5/.8
1.2 DROPS ORAL
Status: DISCONTINUED | OUTPATIENT
Start: 2019-12-10 | End: 2019-12-10 | Stop reason: HOSPADM

## 2019-12-10 RX ORDER — ATROPINE SULFATE 0.1 MG/ML
0.5 INJECTION INTRAVENOUS
Status: DISCONTINUED | OUTPATIENT
Start: 2019-12-10 | End: 2019-12-10 | Stop reason: HOSPADM

## 2019-12-10 RX ADMIN — SODIUM CHLORIDE: 900 INJECTION, SOLUTION INTRAVENOUS at 08:37

## 2019-12-10 RX ADMIN — SODIUM CHLORIDE: 900 INJECTION, SOLUTION INTRAVENOUS at 09:11

## 2019-12-10 RX ADMIN — PROPOFOL 50 MG: 10 INJECTION, EMULSION INTRAVENOUS at 08:44

## 2019-12-10 RX ADMIN — PROPOFOL 50 MG: 10 INJECTION, EMULSION INTRAVENOUS at 08:54

## 2019-12-10 RX ADMIN — PROPOFOL 50 MG: 10 INJECTION, EMULSION INTRAVENOUS at 08:58

## 2019-12-10 RX ADMIN — PROPOFOL 50 MG: 10 INJECTION, EMULSION INTRAVENOUS at 08:50

## 2019-12-10 RX ADMIN — PHENYLEPHRINE HYDROCHLORIDE 80 MCG: 10 INJECTION INTRAVENOUS at 09:13

## 2019-12-10 RX ADMIN — PROPOFOL 50 MG: 10 INJECTION, EMULSION INTRAVENOUS at 08:40

## 2019-12-10 RX ADMIN — LIDOCAINE HYDROCHLORIDE 100 MG: 20 INJECTION, SOLUTION EPIDURAL; INFILTRATION; INTRACAUDAL; PERINEURAL at 08:40

## 2019-12-10 RX ADMIN — PHENYLEPHRINE HYDROCHLORIDE 80 MCG: 10 INJECTION INTRAVENOUS at 09:07

## 2019-12-10 RX ADMIN — PROPOFOL 50 MG: 10 INJECTION, EMULSION INTRAVENOUS at 09:10

## 2019-12-10 RX ADMIN — PHENYLEPHRINE HYDROCHLORIDE 40 MCG: 10 INJECTION INTRAVENOUS at 08:45

## 2019-12-10 RX ADMIN — PROPOFOL 50 MG: 10 INJECTION, EMULSION INTRAVENOUS at 09:03

## 2019-12-10 NOTE — PROCEDURES
Vinita Akhtar Jul 912 Kassie Bryson M.D.  Kirsten Vu, [address] [facility]  [phone]               Esophagogastroduodenoscopy (EGD) Procedure Note    NAME: Tashia Archibald  :  1960  MRN:  [id number]    Indications:  GERD; epigastric pain     : Irma Dong MD    Referring Provider:  Mable Frost DO    Medicine:  MAC anesthesia      Procedure Details:  After informed consent was obtained with all risks and benefits of the procedure explained and preprocedure exam completed, the patient was placed in the left lateral decubitus position. Universal protocol for patient identification was performed and documented in the nursing notes. Throughout the procedure, the patient's blood pressure was monitored at least every five minutes; pulse, and oxygen saturations were monitored continuously. All vital signs were documented in the nursing notes. The endoscope was inserted into the mouth and advanced under direct vision to second portion of the duodenum. A careful inspection was made as the gastroscope was withdrawn, including a retroflexed view of the proximal stomach; findings and interventions are described below.       Findings:   Esophagus: normal  Stomach: moderate diffuse erythema in the body and antrum s/p biopsies throughout the stomach; two sessile polyps in the antrum with greatest diameter of 8 mm s/p biopsies  Duodenum: pseudomelanosis duodeni-moderate throughout, 5 mm sessile polyp (no orifice seen or bile from this area) in the sweep s/p cold snare polypectomy    Interventions:    biopsy of stomach and duodenum    Specimens:   ID Type Source Tests Collected by Time Destination   1 : Duodenum Biopsies Rule Out Celiac Disease and Pseudomelanosis Duodeni Preservative   Lizbeth Franco MD 12/10/2019 4136 Pathology   2 : Duodenal Polyp Preservative   Lizbeth Franco MD 12/10/2019 4374 Pathology   3 : Gastric Polyps Biopsies Preservative   Lizbeth Franco MD 12/10/2019 0856 Pathology   4 : Gastric Biopsies Rule Out H. Pylori Preservative   Madelyn De Jesus MD 12/10/2019 1072 Pathology   5 : Ascending Colon Polyp Presajayative   Madelyn De Jesus MD 12/10/2019 6136 Pathology   6 : Sigmoid Colon Polyp Peaceative   Madelyn De Jesus MD 12/10/2019 0911 Pathology        EBL: None          Complications:     No immediate complications        Impression:  -See post-procedure diagnoses. Recommendations:  -Await pathology.  -Continue PPI. Signed by:  Ziyad Mcfadden MD         12/10/2019 9:18 AM

## 2019-12-10 NOTE — PROGRESS NOTES

## 2019-12-10 NOTE — ANESTHESIA POSTPROCEDURE EVALUATION
Procedure(s):  COLONOSCOPY  ESOPHAGOGASTRODUODENOSCOPY (EGD)  ESOPHAGOGASTRODUODENAL (EGD) BIOPSY  ENDOSCOPIC POLYPECTOMY. MAC    <BSHSIANPOST>    Vitals Value Taken Time   BP 0/0 12/10/2019  9:28 AM   Temp 36.6 °C (97.9 °F) 12/10/2019  9:26 AM   Pulse 0 12/10/2019  9:28 AM   Resp 0 12/10/2019  9:31 AM   SpO2 97 % 12/10/2019  9:26 AM   Vitals shown include unvalidated device data.

## 2019-12-10 NOTE — ROUTINE PROCESS
Vinnie Monroymargaret  1960  [id number]    Situation:  Verbal report received from: linda  Procedure: Procedure(s) with comments:  COLONOSCOPY  ESOPHAGOGASTRODUODENOSCOPY (EGD)  ESOPHAGOGASTRODUODENAL (EGD) BIOPSY  ENDOSCOPIC POLYPECTOMY - EGD    Background:    Preoperative diagnosis: GERD  EPIGASTRIC PAIN  COLON SCREENING  Postoperative diagnosis: 1. Psueudomelanosis Duodeni  2. Duodenal Polyp  3. Gastric Polyps  4. Gastritis  5.   Colon Polyps    :  Dr. Ness Medeiros  Assistant(s): Endoscopy Technician-1: Layman Bosworth C  Endoscopy RN-1: Nesha Freeman RN    Specimens:   ID Type Source Tests Collected by Time Destination   1 : Duodenum Biopsies Rule Out Celiac Disease and Pseudomelanosis Duodeni Preservative   Kyung Gonzales MD 12/10/2019 8137 Pathology   2 : Duodenal Polyp Preservative   Kyung Gonzales MD 12/10/2019 0585 Pathology   3 : Gastric Polyps Biopsies Preservative   Kyung Gonzales MD 12/10/2019 4582 Pathology   4 : Gastric Biopsies Rule Out H. Pylori Preservative   Kyung Gonzales MD 12/10/2019 7708 Pathology   5 : Ascending Colon Polyp Preservative   Kyung Gonzales MD 12/10/2019 8810 Pathology   6 : Sigmoid Colon Polyp Preservative   Kyung Gonzales MD 12/10/2019 0911 Pathology     H. Pylori  no    Assessment:  Intra-procedure medications       Anesthesia gave intra-procedure sedation and medications, see anesthesia flow sheet yes    Intravenous fluids: NS@ KVO     Vital signs stable yes    Abdominal assessment: round and soft yes    Recommendation:  Discharge patient per MD orde yes  Return to floor  Family or Friend yes  Permission to share finding with family or friend yes

## 2019-12-10 NOTE — DISCHARGE INSTRUCTIONS
Vinita Sinha Formerly [facility] [address] Angélica Harry M.D.  Radha Schulz, [address]  [phone]                      EGD/COLONOSCOPY [address]  [id number]  1960    DISCOMFORT:  Redness at IV site- apply warm compress to area; if redness or soreness persist- contact your physician  There may be a slight amount of blood passed from the rectum  Gaseous discomfort- walking, belching will help relieve any discomfort  You may not operate a vehicle for 12 hours  You may not  engage in an occupation involving machinery or appliances for rest of today  You may not  drink alcoholic beverages for at least 12 hours  Avoid making any critical decisions for at least 24 hour    DIET:   You may resume your normal diet, but some patients find that heavy or large  meals may lead to indigestion or vomiting. I suggest a light meal as first food  Intake. I recommend a whole food, plant-based diet for your overall health. ACTIVITY:  You may resume your normal daily activities. It is recommended that you spend the remainder of the day resting - avoid any strenuous activity. CALL M.D. ANY SIGN OF   Increasing pain, nausea, vomiting  Abdominal distension (swelling)  New increased bleeding (oral or rectal)  Fever (chills)  Pain in chest area  Bloody discharge from nose or mouth  Shortness of breath    Additional Instructions:   Call Dr. Angélica Harry if any questions or problems at [phone]   You should receive the biopsy results by phone or mail within 3 weeks, if not, call my office for the results. Should have a repeat colonoscopy in 5-10 years based on pathology. EGD showed gastritis, gastric polyps, duodenal polyp. Colonoscopy showed 2 polyps removed. Continue PPI. Patient Education        Colon Polyps: Care Instructions  Your Care Instructions    Colon polyps are growths in the colon or the rectum.  The cause of most colon polyps is not known, and most people who get them do not have any problems. But a certain kind can turn into cancer. For this reason, regular testing for colon polyps is important for people as they get older. It is also important for anyone who has an increased risk for colon cancer. Polyps are usually found through routine colon cancer screening tests. Although most colon polyps are not cancerous, they are usually removed and then tested for cancer. Screening for colon cancer saves lives because the cancer can usually be cured if it is caught early. If you have a polyp that is the type that can turn into cancer, you may need more tests to examine your entire colon. The doctor will remove any other polyps that he or she finds, and you will be tested more often. Follow-up care is a key part of your treatment and safety. Be sure to make and go to all appointments, and call your doctor if you are having problems. It's also a good idea to know your test results and keep a list of the medicines you take. How can you care for yourself at home? Regular exams to look for colon polyps are the best way to prevent polyps from turning into colon cancer. These can include stool tests, sigmoidoscopy, colonoscopy, and CT colonography. Talk with your doctor about a testing schedule that is right for you. To prevent polyps  There is no home treatment that can prevent colon polyps. But these steps may help lower your risk for cancer. · Stay active. Being active can help you get to and stay at a healthy weight. Try to exercise on most days of the week. Walking is a good choice. · Eat well. Choose a variety of vegetables, fruits, legumes (such as peas and beans), fish, poultry, and whole grains. · Do not smoke. If you need help quitting, talk to your doctor about stop-smoking programs and medicines. These can increase your chances of quitting for good. · If you drink alcohol, limit how much you drink.  Limit alcohol to 2 drinks a day for men and 1 drink a day for women.  When should you call for help? Call your doctor now or seek immediate medical care if:    · You have severe belly pain.     · Your stools are maroon or very bloody.    Watch closely for changes in your health, and be sure to contact your doctor if:    · You have a fever.     · You have nausea or vomiting.     · You have a change in bowel habits (new constipation or diarrhea).     · Your symptoms get worse or are not improving as expected. Where can you learn more? Go to http://jeanette-alyssia.info/. Enter 95 [id number] in the search box to learn more about \"Colon Polyps: Care Instructions. \"  Current as of: December 19, 2018  Content Version: 12.2  © 0004-0306 [employer]. Care instructions adapted under license by [employer] (which disclaims liability or warranty for this information). If you have questions about a medical condition or this instruction, always ask your healthcare professional. [address] any warranty or liability for your use of this information.

## 2019-12-10 NOTE — H&P
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 No pertinent surgical history. Allergies:  No Known Allergies    Home Medications:  Prior to Admission Medications   Prescriptions Last Dose Informant Patient Reported? Taking? OLANZapine (ZYPREXA) 7.5 mg tablet   Yes No   Sig: Take 7.5 mg by mouth nightly. ONE DAILY MULTIVITAMIN tablet 2019 at Unknown time  No Yes   Sig: TAKE 1 TABLET BY MOUTH DAILY   PHENobarbital (LUMINAL) 60 mg tablet 2019 at Unknown time  No Yes   Sig: Take 2 Tabs by mouth nightly. Max Daily Amount: 120 mg.   alprazolam (XANAX) 0.25 mg tablet 2019 at Unknown time Other Yes Yes   Sig: Take 0.125 mg by mouth two (2) times a day. amLODIPine (NORVASC) 10 mg tablet 12/10/2019 at Unknown time  No Yes   Sig: TAKE 1 TABLET BY MOUTH DAILY   azelastine (OPTIVAR) 0.05 % ophthalmic solution 2019 at Unknown time  Yes Yes   Sig: Administer 1 Drop to both eyes every evening. Use in affected eye(s)   busPIRone (BUSPAR) 15 mg tablet 2019 at Unknown time  Yes Yes   Sig: Take 15 mg by mouth two (2) times a day. calcium phosphate-vitamin D3 (CITRACAL + D3, CALCIUM PHOS,) 250 mg calcium- 500 unit chew   No No   Sig: Take 1 Tab by mouth daily.    cetirizine (ZYRTEC) 10 mg tablet 2019 at Unknown time  No Yes   Sig: TAKE 1 TABLET BY MOUTH DAILY   cloNIDine HCl (CATAPRES) 0.3 mg tablet 12/10/2019 at Unknown time  No Yes   Sig: TAKE 1 TABLET BY MOUTH TWICE A DAY   cycloSPORINE (RESTASIS) 0.05 % dpet 12/9/2019 at Unknown time  Yes Yes   Sig: Administer 1 Drop to both eyes every twelve (12) hours. erythromycin (ILOTYCIN) ophthalmic ointment 12/9/2019 at Unknown time  Yes Yes   Sig: Administer  to both eyes nightly. fluticasone (FLONASE) 50 mcg/actuation nasal spray 12/9/2019 at Unknown time  No Yes   Sig: BLOW NOSE: 2 SPRAYS IN EACH NOSTRIL DAILY FOR ALLERGY.   hydrALAZINE (APRESOLINE) 100 mg tablet 12/9/2019 at Unknown time  No Yes   Sig: TAKE ONE (1) TABLET BY MOUTH THREE TIMES A DAY. indapamide (LOZOL) 1.25 mg tablet 12/9/2019 at Unknown time  No Yes   Sig: TAKE 1 TABLET BY MOUTH DAILY   latanoprost (XALATAN) 0.005 % ophthalmic solution 12/9/2019 at Unknown time  Yes Yes   lisinopril (PRINIVIL, ZESTRIL) 40 mg tablet 12/10/2019 at Unknown time  No Yes   Sig: TAKE 1 TABLET BY MOUTH DAILY   memantine (NAMENDA) 10 mg tablet 12/9/2019 at Unknown time  Yes Yes   Sig: Take 10 mg by mouth two (2) times a day. metoprolol succinate (TOPROL-XL) 50 mg XL tablet 12/10/2019 at Unknown time  No Yes   Sig: TAKE ONE TABLET BY MOUTH AT BEDTIME   miconazole nitrate (LOTRIMIN AF JOCK ITCH POWDER) 2 % aerp 12/9/2019 at Unknown time  Yes Yes   Sig: by Apply Externally route. multivit with calcium,iron,min (TAB A JOANNE PO) 12/9/2019 at Unknown time  Yes Yes   Sig: Take 1 Tab by mouth daily. omeprazole (PRILOSEC) 20 mg capsule 12/9/2019 at Unknown time  No Yes   Sig: TAKE (1) CAPSULE BY MOUTH DAILY   omeprazole (PRILOSEC) 20 mg capsule   No No   Sig: TAKE 1 CAPSULE BY MOUTH DAILY   potassium chloride SR (KLOR-CON 10) 10 mEq tablet 12/9/2019 at Unknown time  No Yes   Sig: TAKE 1 TABLET BY MOUTH DAILY   vit A/vit C/vit E/zinc/copper (PRESERVISION AREDS PO) 12/9/2019 at Unknown time  Yes Yes   Sig: Take  by mouth.       Facility-Administered Medications: None       Hospital Medications:  Current Facility-Administered Medications   Medication Dose Route Frequency    0.9% sodium chloride infusion  150 mL/hr IntraVENous CONTINUOUS    sodium chloride (NS) flush 5-40 mL  5-40 mL IntraVENous Q8H    sodium chloride (NS) flush 5-40 mL  5-40 mL IntraVENous PRN    midazolam (VERSED) injection 0.25-5 mg  0.25-5 mg IntraVENous Multiple    fentaNYL citrate (PF) injection 200 mcg  200 mcg IntraVENous Multiple    simethicone (MYLICON) 16ZX/3.8NH oral drops 80 mg  1.2 mL Oral Multiple    atropine injection 0.5 mg  0.5 mg IntraVENous ONCE PRN    EPINEPHrine (ADRENALIN) 0.1 mg/mL syringe 1 mg  1 mg Endoscopically ONCE PRN       Family History:  Family History   Problem Relation Age of Onset    No Known Problems Mother     No Known Problems Father        Social History:  Social History     Tobacco Use    Smoking status: Never Smoker    Smokeless tobacco: Never Used   Substance Use Topics    Alcohol use: No     Alcohol/week: 0.0 standard drinks         PHYSICAL EXAM PRIOR TO SEDATION:  General: Alert, in no acute distress    Lungs:            CTA bilaterally  Heart:  Normal S1, S2    Abdomen: Soft, Non distended, Non tender. Normoactive bowel sounds. Assessment:   Stable for sedation administration.   Date of last colonoscopy: 10 yrs, Polyps  No    Plan:   · Endoscopic procedure with sedation     Signed By: Ainsley Moncada MD     12/10/2019  8:38 AM

## 2019-12-10 NOTE — PROCEDURES
Vinita Rosas 912 Emily Gutierrez M.D.  [address] [place] [address], [address] [place] [address]  [phone]               Colonoscopy Procedure Note    NAME: Marian Jones  :  1960  MRN:  [id number]    Indications:   Screening colonoscopy     : Wilfrido Meredith MD    Referring Provider:  Arthur Singletary DO    Medicines:  MAC anesthesia      Procedure Details:  After informed consent was obtained with all risks and benefits of the procedure explained and preprocedure exam completed, the patient was placed in the left lateral decubitus position. Universal protocol for patient identification was performed and documented in the nursing notes. Throughout the procedure, the patient's blood pressure was monitored at least every five minutes; pulse, and oxygen saturations were monitored continuously. All vital signs were documented in the nursing notes. A digital rectal exam was performed and was normal.  The Olympus videocolonoscope  was inserted in the rectum and carefully advanced to the cecum, which was identified by the ileocecal valve and appendiceal orifice. The colonoscope was slowly withdrawn with careful evaluation between folds. Retroflexion in the rectum was performed; findings and interventions are described below. Procedure start time, extent reached time/cecum time, and procedure end time are documented in the nursing notes. The quality of preparation was good.        Findings:   Rectum: normal  Sigmoid: 1  Sessile polyp(s), the largest 5 mm in size; s/p cold snare polypectomy  Descending Colon: normal  Transverse Colon: normal  Ascending Colon: 1  Sessile polyp(s), the largest 8 mm in size; s/p cold snare polypectomy  Cecum: normal    Interventions:    2 complete polypectomy were performed using cold snare  and the polyps were  retrieved    Specimens:   ID Type Source Tests Collected by Time Destination   1 : Duodenum Biopsies Rule Out Celiac Disease and Pseudomelanosis Duodeni Preservative   Samira Woody MD 12/10/2019 9400 Pathology   2 : Duodenal Polyp Preservative   Samira Woody MD 12/10/2019 2619 Pathology   3 : Gastric Polyps Biopsies Preservative   Samira Woody MD 12/10/2019 5845 Pathology   4 : Gastric Biopsies Rule Out H. Pylori Preservative   Samira Woody MD 12/10/2019 6610 Pathology   5 : Ascending Colon Polyp Preservative   Samira Woody MD 12/10/2019 4882 Pathology   6 : Sigmoid Colon Polyp Preservative   Samira Woody MD 12/10/2019 0911 Pathology       EBL:  None. Complications:   No immediate complications     Impression:  -Benign appearing colon polyps, removed as above. Recommendations:   -If adenoma is present, repeat colonoscopy in 5 years. If < 10 years, reason:  above average risk patient    Resume normal medication(s). Signed by:  Zamzam Whitt MD          12/10/2019  9:25 AM

## 2020-01-20 ENCOUNTER — OFFICE VISIT (OUTPATIENT)
Dept: INTERNAL MEDICINE CLINIC | Age: 60
End: 2020-01-20

## 2020-01-20 ENCOUNTER — HOSPITAL ENCOUNTER (OUTPATIENT)
Dept: LAB | Age: 60
Discharge: HOME | End: 2020-01-20
Payer: MEDICARE

## 2020-01-20 VITALS
SYSTOLIC BLOOD PRESSURE: 120 MMHG | BODY MASS INDEX: 28.35 KG/M2 | DIASTOLIC BLOOD PRESSURE: 72 MMHG | HEIGHT: 70 IN | HEART RATE: 68 BPM | OXYGEN SATURATION: 99 % | TEMPERATURE: 97.3 F | WEIGHT: 198 LBS | RESPIRATION RATE: 18 BRPM

## 2020-01-20 DIAGNOSIS — I10 ESSENTIAL HYPERTENSION: Primary | ICD-10-CM

## 2020-01-20 DIAGNOSIS — G40.909 SEIZURE DISORDER (HCC): ICD-10-CM

## 2020-01-20 DIAGNOSIS — F31.70 BIPOLAR DISORDER IN FULL REMISSION, MOST RECENT EPISODE UNSPECIFIED TYPE (HCC): ICD-10-CM

## 2020-01-20 DIAGNOSIS — E66.9 OBESITY (BMI 30.0-34.9): ICD-10-CM

## 2020-01-20 DIAGNOSIS — Z87.820 HISTORY OF TRAUMATIC BRAIN INJURY: ICD-10-CM

## 2020-01-20 DIAGNOSIS — R41.3 MEMORY IMPAIRMENT: ICD-10-CM

## 2020-01-20 DIAGNOSIS — E78.2 MIXED HYPERLIPIDEMIA: ICD-10-CM

## 2020-01-20 PROCEDURE — 80053 COMPREHEN METABOLIC PANEL: CPT

## 2020-01-20 PROCEDURE — 36415 COLL VENOUS BLD VENIPUNCTURE: CPT

## 2020-01-20 PROCEDURE — 80061 LIPID PANEL: CPT

## 2020-01-20 PROCEDURE — 85027 COMPLETE CBC AUTOMATED: CPT

## 2020-01-20 NOTE — PROGRESS NOTES
HISTORY OF PRESENT ILLNESS  Alexx Tanner is a 62 y.o. male. Pt. comes in with his  for f/u. Has a few chronic medical issues as documented. BP looks great. He is obese but trying to lose weight. Followed by psychiatrist for bipolar disorder. Medications help. Has not had seizures in a long time. History of TBI with memory impairment and some behavior issues. Sun Ching is helping some. Had recent MRI and evaluation by neurologist.  Lives independently with close supervision. Needs a form filled for [employer]. PMH/PSH/Allergies/Social History/medication list and most recent studies reviewed with patient. Tobacco use: No  Alcohol use: No    Reports compliance with medications and diet. Trying to be active physically to control weight. Reports no other new c/o. HPI    Review of Systems   Constitutional: Negative. HENT: Negative. Eyes: Negative. Respiratory: Negative for shortness of breath. Cardiovascular: Negative for chest pain and leg swelling. Gastrointestinal: Positive for heartburn. Negative for abdominal pain and nausea. Genitourinary: Negative for dysuria. Musculoskeletal: Negative for falls and joint pain. Skin: Negative. Neurological: Positive for seizures. Negative for dizziness, sensory change and headaches. Endo/Heme/Allergies: Negative. Psychiatric/Behavioral: Positive for memory loss. Negative for depression. The patient is nervous/anxious. The patient does not have insomnia. All other systems reviewed and are negative. Physical Exam  Vitals signs and nursing note reviewed. Constitutional:       General: He is not in acute distress. Appearance: He is well-developed. Comments: obese   HENT:      Head: Normocephalic and atraumatic. Nose: Nose normal.      Mouth/Throat:      Mouth: Mucous membranes are moist.   Eyes:      General: No scleral icterus.      Conjunctiva/sclera: Conjunctivae normal.      Comments: Poor vision Neck:      Musculoskeletal: Normal range of motion and neck supple. Thyroid: No thyromegaly. Vascular: No JVD. Cardiovascular:      Rate and Rhythm: Normal rate and regular rhythm. Heart sounds: Normal heart sounds. No murmur. Pulmonary:      Effort: Pulmonary effort is normal. No respiratory distress. Breath sounds: Normal breath sounds. No wheezing or rales. Abdominal:      General: Bowel sounds are normal. There is no distension. Palpations: Abdomen is soft. Tenderness: There is no tenderness. Comments: obese   Musculoskeletal:         General: No tenderness. Skin:     General: Skin is warm and dry. Findings: No rash. Neurological:      Mental Status: He is alert. Coordination: Coordination normal.      Comments: A+O to name, Wes, not date   Doesn't know president's name   Psychiatric:         Behavior: Behavior normal.      Comments: Slow speech  Chronic cognitive deficit         ASSESSMENT and PLAN  Diagnoses and all orders for this visit:    1. Essential hypertension  -     LIPID PANEL  -     METABOLIC PANEL, COMPREHENSIVE  -     CBC W/O DIFF    2. Mixed hyperlipidemia  -     LIPID PANEL    3. Obesity (BMI 30.0-34.9)    4. Bipolar disorder in full remission, most recent episode unspecified type ([address]    5. Seizure disorder ([address]    6. Memory impairment    7. History of traumatic brain injury      Follow-up and Dispositions    · Return in about 4 months (around 5/20/2020).      lab results and schedule of future lab studies reviewed with patient  reviewed diet, exercise and weight control  reviewed medications and side effects in detail  F/u with other MD's as scheduled  Overall stable  Form for [employer] will be filled and faxed

## 2020-01-20 NOTE — PROGRESS NOTES
Health Maintenance Due   Topic Date Due    Shingrix Vaccine Age 49> (1 of 2) 10/22/2008    FOBT Q 1 YEAR AGE 50-75  04/07/2017    Influenza Age 5 to Adult  08/01/2019       Chief Complaint   Patient presents with    Hypertension     4 month follow up    Cholesterol Problem    Dementia       1. Have you been to the ER, urgent care clinic since your last visit? Hospitalized since your last visit? No    2. Have you seen or consulted any other health care providers outside of the [address] [place] [address] since your last visit? Include any pap smears or colon screening. No    3) Do you have an Advance Directive on file? no    4) Are you interested in receiving information on Advance Directives? NO      Patient is accompanied by adult caretaker I have received verbal consent from Alicia Crooks to discuss any/all medical information while they are present in the room.

## 2020-01-21 LAB
ALBUMIN SERPL-MCNC: 4.3 G/DL (ref 3.8–4.8)
ALBUMIN/GLOB SERPL: 1.7 {RATIO} (ref 1.2–2.2)
ALP SERPL-CCNC: 70 IU/L (ref 39–117)
ALT SERPL-CCNC: 16 IU/L (ref 0–44)
AST SERPL-CCNC: 19 IU/L (ref 0–40)
BILIRUB SERPL-MCNC: <0.2 MG/DL (ref 0–1.2)
BUN SERPL-MCNC: 12 MG/DL (ref 8–27)
BUN/CREAT SERPL: 11 (ref 10–24)
CALCIUM SERPL-MCNC: 9.1 MG/DL (ref 8.6–10.2)
CHLORIDE SERPL-SCNC: 105 MMOL/L (ref 96–106)
CHOLEST SERPL-MCNC: 223 MG/DL (ref 100–199)
CO2 SERPL-SCNC: 22 MMOL/L (ref 20–29)
CREAT SERPL-MCNC: 1.06 MG/DL (ref 0.76–1.27)
ERYTHROCYTE [DISTWIDTH] IN BLOOD BY AUTOMATED COUNT: 12.4 % (ref 11.6–15.4)
GLOBULIN SER CALC-MCNC: 2.6 G/DL (ref 1.5–4.5)
GLUCOSE SERPL-MCNC: 85 MG/DL (ref 65–99)
HCT VFR BLD AUTO: 39 % (ref 37.5–51)
HDLC SERPL-MCNC: 43 MG/DL
HGB BLD-MCNC: 13.6 G/DL (ref 13–17.7)
INTERPRETATION, 910389: NORMAL
LDLC SERPL CALC-MCNC: 156 MG/DL (ref 0–99)
MCH RBC QN AUTO: 32 PG (ref 26.6–33)
MCHC RBC AUTO-ENTMCNC: 34.9 G/DL (ref 31.5–35.7)
MCV RBC AUTO: 92 FL (ref 79–97)
PLATELET # BLD AUTO: 280 X10E3/UL (ref 150–450)
POTASSIUM SERPL-SCNC: 3.6 MMOL/L (ref 3.5–5.2)
PROT SERPL-MCNC: 6.9 G/DL (ref 6–8.5)
RBC # BLD AUTO: 4.25 X10E6/UL (ref 4.14–5.8)
SODIUM SERPL-SCNC: 144 MMOL/L (ref 134–144)
TRIGL SERPL-MCNC: 121 MG/DL (ref 0–149)
VLDLC SERPL CALC-MCNC: 24 MG/DL (ref 5–40)
WBC # BLD AUTO: 7.2 X10E3/UL (ref 3.4–10.8)

## 2020-01-21 RX ORDER — ATORVASTATIN CALCIUM 10 MG/1
10 TABLET, FILM COATED ORAL DAILY
Qty: 30 TAB | Refills: 5 | Status: SHIPPED | OUTPATIENT
Start: 2020-01-21 | End: 2020-06-30

## 2020-01-21 NOTE — PROGRESS NOTES
High cholesterol ---- watch fatty food/exercise  Start Lipitor 10 mg nightly --I sent to pharmacy  Recheck lipids, ALT, AST in 6 to 8 weeks    All other labs are stable

## 2020-01-31 RX ORDER — HYDRALAZINE HYDROCHLORIDE 100 MG/1
TABLET, FILM COATED ORAL
Qty: 93 TAB | Status: SHIPPED | OUTPATIENT
Start: 2020-01-31 | End: 2020-12-29

## 2020-02-18 RX ORDER — FLUTICASONE PROPIONATE 50 MCG
2 SPRAY, SUSPENSION (ML) NASAL DAILY
Qty: 1 BOTTLE | Refills: 11 | Status: SHIPPED | OUTPATIENT
Start: 2020-02-18

## 2020-03-02 RX ORDER — POTASSIUM CHLORIDE 750 MG/1
TABLET, FILM COATED, EXTENDED RELEASE ORAL
Qty: 29 TAB | Status: SHIPPED | OUTPATIENT
Start: 2020-03-02 | End: 2021-02-26

## 2020-03-04 RX ORDER — CLONIDINE HYDROCHLORIDE 0.3 MG/1
TABLET ORAL
Qty: 58 TAB | Status: SHIPPED | OUTPATIENT
Start: 2020-03-04 | End: 2020-03-04

## 2020-03-04 RX ORDER — CLONIDINE HYDROCHLORIDE 0.3 MG/1
TABLET ORAL
Qty: 58 TAB | Status: SHIPPED | OUTPATIENT
Start: 2020-03-04 | End: 2021-02-26

## 2020-03-05 DIAGNOSIS — E78.2 MIXED HYPERLIPIDEMIA: Primary | ICD-10-CM

## 2020-03-27 DIAGNOSIS — J30.2 SEASONAL ALLERGIC RHINITIS, UNSPECIFIED TRIGGER: ICD-10-CM

## 2020-03-30 RX ORDER — CETIRIZINE HCL 10 MG
TABLET ORAL
Qty: 31 TAB | Status: SHIPPED | OUTPATIENT
Start: 2020-03-30 | End: 2021-03-31

## 2020-03-30 RX ORDER — OMEPRAZOLE 20 MG/1
CAPSULE, DELAYED RELEASE ORAL
Qty: 31 CAP | Status: SHIPPED | OUTPATIENT
Start: 2020-03-30 | End: 2020-05-19 | Stop reason: SDUPTHER

## 2020-03-31 ENCOUNTER — OFFICE VISIT (OUTPATIENT)
Dept: INTERNAL MEDICINE CLINIC | Age: 60
End: 2020-03-31

## 2020-03-31 VITALS
WEIGHT: 201.1 LBS | BODY MASS INDEX: 28.79 KG/M2 | DIASTOLIC BLOOD PRESSURE: 68 MMHG | OXYGEN SATURATION: 98 % | SYSTOLIC BLOOD PRESSURE: 110 MMHG | RESPIRATION RATE: 16 BRPM | TEMPERATURE: 98.2 F | HEIGHT: 70 IN | HEART RATE: 57 BPM

## 2020-03-31 DIAGNOSIS — F31.70 BIPOLAR DISORDER IN FULL REMISSION, MOST RECENT EPISODE UNSPECIFIED TYPE (HCC): ICD-10-CM

## 2020-03-31 DIAGNOSIS — L08.9 INFECTED SEBACEOUS CYST: Primary | ICD-10-CM

## 2020-03-31 DIAGNOSIS — I10 ESSENTIAL HYPERTENSION: ICD-10-CM

## 2020-03-31 DIAGNOSIS — E78.2 MIXED HYPERLIPIDEMIA: ICD-10-CM

## 2020-03-31 DIAGNOSIS — L72.3 INFECTED SEBACEOUS CYST: Primary | ICD-10-CM

## 2020-03-31 RX ORDER — DOXYCYCLINE 100 MG/1
100 TABLET ORAL 2 TIMES DAILY
Qty: 20 TAB | Refills: 0 | Status: SHIPPED | OUTPATIENT
Start: 2020-03-31 | End: 2020-04-10

## 2020-03-31 NOTE — PROGRESS NOTES
Chief Complaint   Patient presents with    Cyst     on left side of chest         1. Have you been to the ER, urgent care clinic since your last visit? Hospitalized since your last visit? no    2. Have you seen or consulted any other health care providers outside of the [address] [place] [address] since your last visit? Include any pap smears or colon screening.   Yes colonscopy 2 month ago had polyps took antibotics

## 2020-03-31 NOTE — PROGRESS NOTES
HISTORY OF PRESENT ILLNESS  Jeane Garcia is a 62 y.o. male. Pt. comes in with his  for f/u. Has a few chronic medical issues as documented. Reports having a large cyst on left upper chest.  He just noticed it yesterday. Reports some tenderness with touching otherwise no pain. No drainage. Denies chest pain, dyspnea, fever, shortness of breath. BP is stable. Followed by psychiatry for bipolar disorder. He is overweight and has gained more weight. No seizures in a long time. Has been staying home because of coronavirus. PMH/PSH/Allergies/Social History/medication list and most recent studies reviewed with patient. Tobacco use: No  Alcohol use: No  Reports compliance with medications and diet. Trying to be active physically to control weight. Reports no other new c/o. HPI    Review of Systems   Constitutional: Negative. HENT: Negative. Eyes: Negative. Respiratory: Negative for shortness of breath. Cardiovascular: Negative for chest pain and leg swelling. Gastrointestinal: Positive for heartburn. Negative for abdominal pain and nausea. Genitourinary: Negative for dysuria. Musculoskeletal: Negative for falls and joint pain. Skin: Negative. Negative for itching and rash. cyst   Neurological: Positive for seizures. Negative for dizziness, sensory change and headaches. Endo/Heme/Allergies: Negative. Psychiatric/Behavioral: Positive for memory loss. Negative for depression. The patient is nervous/anxious. The patient does not have insomnia. All other systems reviewed and are negative. Physical Exam  Vitals signs and nursing note reviewed. Constitutional:       General: He is not in acute distress. Appearance: He is well-developed. Comments: obese   HENT:      Head: Normocephalic and atraumatic. Nose: Nose normal.      Mouth/Throat:      Mouth: Mucous membranes are moist.   Eyes:      General: No scleral icterus.      Conjunctiva/sclera: Conjunctivae normal.      Comments: Poor vision   Neck:      Musculoskeletal: Normal range of motion and neck supple. Thyroid: No thyromegaly. Vascular: No JVD. Cardiovascular:      Rate and Rhythm: Normal rate and regular rhythm. Heart sounds: Normal heart sounds. No murmur. Pulmonary:      Effort: Pulmonary effort is normal. No respiratory distress. Breath sounds: Normal breath sounds. No wheezing or rales. Abdominal:      General: Bowel sounds are normal. There is no distension. Palpations: Abdomen is soft. Tenderness: There is no abdominal tenderness. Comments: obese   Musculoskeletal:         General: No tenderness. Skin:     General: Skin is warm and dry. Findings: No erythema or rash. Comments: 2.5 x 2.5 sebaceous cyst in left upper chest area with a central spot but no opening or drainage, minimal tenderness   Neurological:      Mental Status: He is alert. Coordination: Coordination normal.      Comments: A+O to name, Wes, not date   Doesn't know president's name   Psychiatric:         Behavior: Behavior normal.      Comments: Slow speech  Chronic cognitive deficit         ASSESSMENT and PLAN  Diagnoses and all orders for this visit:    1. Infected sebaceous cyst  -     REFERRAL TO GENERAL SURGERY    2. Essential hypertension    3. Mixed hyperlipidemia    4. Bipolar disorder in full remission, most recent episode unspecified type ([address]    Other orders  -     doxycycline (ADOXA) 100 mg tablet; Take 1 Tab by mouth two (2) times a day for 10 days.   Apply warm compress to the area as needed    Follow-up and Dispositions    · Return if symptoms worsen or fail to improve.     lab results and schedule of future lab studies reviewed with patient  reviewed diet, exercise and weight control  reviewed medications and side effects in detail  F/u with other MD's as scheduled  Etiology and treatment of sebaceous cyst discussed with patient and his   Will refer patient to general surgery since this is a large cyst he need to be removed  Printed information about sebaceous cyst given to patient  COVID-19 precautions discussed with pt

## 2020-03-31 NOTE — PATIENT INSTRUCTIONS
Epidermoid Cyst: Care Instructions Your Care Instructions An epidermoid (say \"jc-ehq-TJU-isac\") cyst is a lump just under the skin. These cysts can form when a hair follicle becomes blocked. They are common in acne and may occur on the face, neck, back, and genitals. However, they can form anywhere on the body. These cysts are not cancer and do not lead to cancer. They tend not to hurt, but they can sometimes become swollen and painful. They also may break open (rupture) and cause scarring. These cysts sometimes do not cause problems and may not need treatment. If you have a cyst that is swollen and hurts, your doctor may inject it with a medicine to help it heal. But it is more likely that a painful cyst will need to be removed. Your doctor will give you a shot of numbing medicine and cut into the cyst to drain it or remove it. This makes the symptoms go away. Follow-up care is a key part of your treatment and safety. Be sure to make and go to all appointments, and call your doctor if you are having problems. It's also a good idea to know your test results and keep a list of the medicines you take. How can you care for yourself at home? · Do not squeeze the cyst or poke it with a needle to open it. This can cause swelling, redness, and infection. · Always have a doctor look at any new lumps you get to make sure that they are not serious. When should you call for help? Watch closely for changes in your health, and be sure to contact your doctor if: 
  · You have a fever, redness, or swelling after you get a shot of medicine in the cyst.  
  · You see or feel a new lump on your skin. Where can you learn more? Go to http://jeanette-alyssia.info/ Enter X198 in the search box to learn more about \"Epidermoid Cyst: Care Instructions. \" Current as of: October 30, 2019Content Version: 12.4 © 4182-5081 [employer]wise[employer]orporated. Care instructions adapted under license by [employer] (which disclaims liability or warranty for this information). If you have questions about a medical condition or this instruction, always ask your healthcare professional. [address] any warranty or liability for your use of this information.

## 2020-04-27 ENCOUNTER — OFFICE VISIT (OUTPATIENT)
Dept: SURGERY | Age: 60
End: 2020-04-27

## 2020-04-27 VITALS
RESPIRATION RATE: 18 BRPM | SYSTOLIC BLOOD PRESSURE: 117 MMHG | DIASTOLIC BLOOD PRESSURE: 65 MMHG | TEMPERATURE: 98.3 F | OXYGEN SATURATION: 97 % | HEIGHT: 70 IN | BODY MASS INDEX: 28.35 KG/M2 | WEIGHT: 198 LBS | HEART RATE: 73 BPM

## 2020-04-27 DIAGNOSIS — L72.3 SEBACEOUS CYST: Primary | ICD-10-CM

## 2020-04-27 NOTE — PROGRESS NOTES
Sammy Hicks is a 62 y.o. male who is referred by Dr. Jovani Barfield for further evaluation of a sebaceous cyst on the left side of his chest.    Mr. Tawnya Campbell tells me that he has had a subcutaneous mass on the left side of his chest for approximately one month now. Found to have an infected sebaceous cyst. Started on abx which he has completed. Mr. Tawnya Campbell reports drainage from the cyst which is neither purulent nor foul smelling. No fevers or chills. He has otherwise been in his usual state of health. 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     rp  abhay walker -[phone]    S/P colonoscopy 4-6-12    rt n    S/P total hip arthroplasty     left    Sebaceous cyst 4/27/2020    Seizures ([facility] [address]      Past Surgical History:   Procedure Laterality Date    COLONOSCOPY N/A 12/10/2019    COLONOSCOPY performed by Clinton Yan MD at [facility] ENDOSCOPY     Family History   Problem Relation Age of Onset    No Known Problems Mother     No Known Problems Father      Social History     Socioeconomic History    Marital status: SINGLE     Spouse name: Not on file    Number of children: Not on file    Years of education: Not on file    Highest education level: Not on file   Tobacco Use    Smoking status: Never Smoker    Smokeless tobacco: Never Used   Substance and Sexual Activity    Alcohol use: No     Alcohol/week: 0.0 standard drinks    Drug use: No    Sexual activity: Never     Review of systems negative except as noted. Review of Systems   Constitutional: Negative for chills and fever. Gastrointestinal: Positive for heartburn. Negative for nausea and vomiting.    Musculoskeletal:        Discomfort at site of sebaceous cyst. Psychiatric/Behavioral: The patient is nervous/anxious. Physical Exam  Vitals signs reviewed. Constitutional:       General: He is not in acute distress. Appearance: Normal appearance. HENT:      Head: Normocephalic and atraumatic. Eyes:      General: No scleral icterus. Neck:      Musculoskeletal: Neck supple. Cardiovascular:      Rate and Rhythm: Normal rate and regular rhythm. Pulmonary:      Effort: Pulmonary effort is normal.      Breath sounds: Normal breath sounds. Abdominal:      General: There is no distension. Palpations: Abdomen is soft. Tenderness: There is no abdominal tenderness. There is no guarding or rebound. Musculoskeletal: Normal range of motion. Lymphadenopathy:      Cervical: No cervical adenopathy. Skin:            Comments: Approximately 2cm x 3cm, well circumscribed, freely movable, subcutaneous mass. No purulent drainage is noted. There is no surrounding cellulitis or induration. Clinically, this is c/w a sebaceous cyst.   Neurological:      General: No focal deficit present. Mental Status: He is alert. ASSESSMENT and PLAN  Denis Angel is a 62 y.o. male with a sebaceous cyst on the left side of his chest. In view of the findings on history and physical exam he should benefit from excision. Discussed procedure with him including risks of bleeding, infection, recurrence. He understands and wishes to proceed. Consent on chart.      [facility]  OFFICE PROCEDURE PROGRESS NOTE        Chart reviewed for the following:   Mora JACKSON MD, have reviewed the History, Physical and updated the Allergic reactions for [address] [place] [address] Street performed immediately prior to start of procedure:   Mora JACKSON MD, have performed the following reviews on Geradine Layer prior to the start of the procedure:            * Patient was identified by name and date of birth   * Agreement on procedure being performed was verified  * Risks and Benefits explained to the patient  * Procedure site verified and marked as necessary  * Patient was positioned for comfort  * Consent was signed and verified     Time: 11:40 AM      Date of procedure: 4/27/2020    Procedure performed by:  Darling Mcwilliams MD    Provider assisted by: Mejia Mesa LPN    How tolerated by patient: tolerated the procedure well with no complications    Post Procedural Pain Scale: 0 - No Hurt    Comments: None. Procedure: Following betadine prep and drape, local anesthetic was infiltrated and an incision over the sebaceous cyst was opened sharply. The sebaceous cyst was dissected free circumferentially and excised. Hemostasis with pressure. Incision closed with interrupted 3-0 Vicryl suture followed by 3-0 Nylon vertical mattress sutures to skin. Antibiotic ointment and pressure dressing applied. Told Mr. Mook Roper that he can remove the dressing on April 29, 2020 and get the incision wet. Asked him to keep a dry dressing over incision. Do not believe that there is a need for further antibiotic therapy. Tylenol or Motrin for pain. Will see in one more week or earlier if need be.       CC: Ray Mccollum, DO

## 2020-04-27 NOTE — PATIENT INSTRUCTIONS
Remove bandage 4/29/2020 Patient can then shower get wound wet Pat dry and cover with bandage Epidermoid Cyst: Care Instructions Your Care Instructions An epidermoid (say \"Saturnino\") cyst is a lump just under the skin. These cysts can form when a hair follicle becomes blocked. They are common in acne and may occur on the face, neck, back, and genitals. However, they can form anywhere on the body. These cysts are not cancer and do not lead to cancer. They tend not to hurt, but they can sometimes become swollen and painful. They also may break open (rupture) and cause scarring. These cysts sometimes do not cause problems and may not need treatment. If you have a cyst that is swollen and hurts, your doctor may inject it with a medicine to help it heal. But it is more likely that a painful cyst will need to be removed. Your doctor will give you a shot of numbing medicine and cut into the cyst to drain it or remove it. This makes the symptoms go away. Follow-up care is a key part of your treatment and safety. Be sure to make and go to all appointments, and call your doctor if you are having problems. It's also a good idea to know your test results and keep a list of the medicines you take. How can you care for yourself at home? · Do not squeeze the cyst or poke it with a needle to open it. This can cause swelling, redness, and infection. · Always have a doctor look at any new lumps you get to make sure that they are not serious. When should you call for help? Watch closely for changes in your health, and be sure to contact your doctor if: 
  · You have a fever, redness, or swelling after you get a shot of medicine in the cyst.  
  · You see or feel a new lump on your skin. Where can you learn more? Go to http://jeanette-alyssia.info/ Enter L592 in the search box to learn more about \"Epidermoid Cyst: Care Instructions. \" Current as of: October 30, 2019Content Version: 12.4 © 7855-5744 Healthwise, Incorporated. Care instructions adapted under license by [facility] (which disclaims liability or warranty for this information). If you have questions about a medical condition or this instruction, always ask your healthcare professional. [address] any warranty or liability for your use of this information.

## 2020-04-27 NOTE — PROGRESS NOTES
1. Have you been to the ER, urgent care clinic since your last visit? Hospitalized since your last visit? No    2. Have you seen or consulted any other health care providers outside of the [address] [place] [address] since your last visit? Include any pap smears or colon screening.  No

## 2020-05-01 RX ORDER — AMLODIPINE BESYLATE 10 MG/1
TABLET ORAL
Qty: 30 TAB | Refills: 11 | Status: SHIPPED | OUTPATIENT
Start: 2020-05-01 | End: 2021-04-30

## 2020-05-04 ENCOUNTER — OFFICE VISIT (OUTPATIENT)
Dept: SURGERY | Age: 60
End: 2020-05-04

## 2020-05-04 VITALS
DIASTOLIC BLOOD PRESSURE: 79 MMHG | OXYGEN SATURATION: 98 % | RESPIRATION RATE: 16 BRPM | WEIGHT: 198 LBS | BODY MASS INDEX: 28.35 KG/M2 | HEART RATE: 82 BPM | TEMPERATURE: 98.3 F | SYSTOLIC BLOOD PRESSURE: 138 MMHG | HEIGHT: 70 IN

## 2020-05-04 DIAGNOSIS — L72.3 SEBACEOUS CYST: Primary | ICD-10-CM

## 2020-05-04 NOTE — PROGRESS NOTES
Sonia Cain is a 62 y.o. male who returns for post-operative evaluation. Mr. Tamara Lema is s/p excision of a sebaceous cyst from the left side of his chest on April 27, 2020. Doing well since then. No complaints today. He reports no pain at the surgical site. No redness or drainage at surgical site. No fevers or chills. He has otherwise been in his usual state of health. 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     rp  abhay walker -[phone]    S/P colonoscopy 4-6-12    rt n    S/P total hip arthroplasty     left    Sebaceous cyst 4/27/2020    Seizures ([facility] [address]      Past Surgical History:   Procedure Laterality Date    COLONOSCOPY N/A 12/10/2019    COLONOSCOPY performed by Inés Bridges MD at [facility] ENDOSCOPY     Family History   Problem Relation Age of Onset    No Known Problems Mother     No Known Problems Father      Social History     Socioeconomic History    Marital status: SINGLE     Spouse name: Not on file    Number of children: Not on file    Years of education: Not on file    Highest education level: Not on file   Tobacco Use    Smoking status: Never Smoker    Smokeless tobacco: Never Used   Substance and Sexual Activity    Alcohol use: No     Alcohol/week: 0.0 standard drinks    Drug use: No    Sexual activity: Never     Review of systems negative except as noted. Review of Systems   Constitutional: Negative for chills and fever. Musculoskeletal:        Denies pain at surgical site. Physical Exam  Vitals signs reviewed. Constitutional:       General: He is not in acute distress. Appearance: Normal appearance. HENT:      Head: Normocephalic and atraumatic.    Cardiovascular:      Rate and Rhythm: Normal rate and regular rhythm. Pulmonary:      Effort: Pulmonary effort is normal.      Breath sounds: Normal breath sounds. Abdominal:      General: There is no distension. Palpations: Abdomen is soft. Tenderness: There is no abdominal tenderness. Musculoskeletal: Normal range of motion. Skin:     Comments: Left chest incision is clean and well healed. Neurological:      General: No focal deficit present. Mental Status: He is alert. ASSESSMENT and PLAN  Skin sutures removed without incident. Reassured Mr. Hank Stroud that he is doing well and that the incision has healed nicely. Asked Mr. Hank Stroud to follow up with Dr. Jaquan Sanchez as scheduled. Will see as needed.        CC: Zaire Briceno, DO

## 2020-05-04 NOTE — PROGRESS NOTES
1. Have you been to the ER, urgent care clinic since your last visit? Hospitalized since your last visit? No    2. Have you seen or consulted any other health care providers outside of the [address] [place] [address] since your last visit? Include any pap smears or colon screening.  No

## 2020-05-19 ENCOUNTER — VIRTUAL VISIT (OUTPATIENT)
Dept: INTERNAL MEDICINE CLINIC | Age: 60
End: 2020-05-19

## 2020-05-19 VITALS — WEIGHT: 199 LBS | BODY MASS INDEX: 28.49 KG/M2 | HEIGHT: 70 IN

## 2020-05-19 DIAGNOSIS — F31.70 BIPOLAR DISORDER IN FULL REMISSION, MOST RECENT EPISODE UNSPECIFIED TYPE (HCC): ICD-10-CM

## 2020-05-19 DIAGNOSIS — G40.909 SEIZURE DISORDER (HCC): ICD-10-CM

## 2020-05-19 DIAGNOSIS — E78.2 MIXED HYPERLIPIDEMIA: ICD-10-CM

## 2020-05-19 DIAGNOSIS — Z87.820 HISTORY OF TRAUMATIC BRAIN INJURY: ICD-10-CM

## 2020-05-19 DIAGNOSIS — I10 ESSENTIAL HYPERTENSION: Primary | ICD-10-CM

## 2020-05-19 NOTE — PROGRESS NOTES
Tennille John is a 62 y.o. male who was seen by synchronous (real-time) audio-video technology on 5/19/2020. Consent: Tennille John, who was seen by synchronous (real-time) audio-video technology, and/or his healthcare decision maker, is aware that this patient-initiated, Telehealth encounter on 5/19/2020 is a billable service, with coverage as determined by his insurance carrier. He is aware that he may receive a bill and has provided verbal consent to proceed: Yes. Assessment & Plan:   Diagnoses and all orders for this visit:    1. Essential hypertension    2. Mixed hyperlipidemia  -     LIPID PANEL  -     ALT  -     AST    3. Seizure disorder ([facility]    4. Bipolar disorder in full remission, most recent episode unspecified type ([facility]    5. History of traumatic brain injury          I spent at least 23 minutes on this visit with this established patient. ([id number])    Subjective:   Tennille John is a 62 y.o. male who was seen for Hypertension; Cholesterol Problem; and Mental Health Problem. Patient is seen with his caregiver at home. He has a few chronic medical issues including HTN, HLD, seizures, bipolar disorder, memory impairment, and history of TBI. Depends on others for some ADLs. Followed by other specialists including neurologist and psychiatrist as well. Also has glaucoma. Followed by ophthalmologist and remains on eye drops. Med list and most recent studies reviewed. Cholesterol has been high. I started him on Lipitor a few months ago. Has not done follow-up labs yet. Denies any significant medications. Has been mostly at home because of COVID-19. Denies any related symptoms including fever, cough, dyspnea, chest pain, GI or . Denies tobacco or alcohol use. No other new complaints.     Plan:  Continue current medications  Repeat lipids, ALT, AST  Watch diet especially fatty foods, remain active physically and control weight  F/u with other MD's as scheduled  COVID-19 precautions discussed with pt  Follow-up 4 to 6 months or as needed    Prior to Admission medications    Medication Sig Start Date End Date Taking? Authorizing Provider   amLODIPine (NORVASC) 10 mg tablet TAKE 1 TABLET BY MOUTH DAILY 5/1/20  Yes Yolanda Lerner NP   lisinopriL (PRINIVIL, ZESTRIL) 40 mg tablet TAKE 1 TABLET BY MOUTH DAILY 5/1/20  Yes Lincoln Stout DO   cetirizine (ZYRTEC) 10 mg tablet TAKE 1 TABLET BY MOUTH DAILY 3/30/20  Yes Filemon Lerner NP   One Daily Multivitamin tablet TAKE 1 TABLET BY MOUTH DAILY 3/30/20  Yes Lincoln Stout DO   indapamide (LOZOL) 1.25 mg tablet TAKE 1 TABLET BY MOUTH DAILY 3/30/20  Yes Linclon Stout DO   cloNIDine HCL (CATAPRES) 0.3 mg tablet TAKE 1 TABLET BY MOUTH TWICE A DAY 3/4/20  Yes Filemon Lerner NP   potassium chloride SR (KLOR-CON 10) 10 mEq tablet TAKE 1 TABLET BY MOUTH DAILY 3/2/20  Yes Lincoln Stout DO   fluticasone propionate (FLONASE) 50 mcg/actuation nasal spray 2 Sprays by Both Nostrils route daily. 2/18/20  Yes Lincoln Stout DO   hydrALAZINE (APRESOLINE) 100 mg tablet TAKE ONE (1) TABLET BY MOUTH THREE TIMES A DAY. 1/31/20  Yes Filemon Lerner NP   atorvastatin (LIPITOR) 10 mg tablet Take 1 Tab by mouth daily. 1/21/20  Yes Lincoln Stout DO   azelastine (OPTIVAR) 0.05 % ophthalmic solution Administer 1 Drop to both eyes every evening. Use in affected eye(s)   Yes Provider, Historical   miconazole nitrate (LOTRIMIN AF JOCK ITCH POWDER) 2 % aerp by Apply Externally route. Yes Provider, Historical   cycloSPORINE (RESTASIS) 0.05 % dpet Administer 1 Drop to both eyes every twelve (12) hours. Yes Provider, Historical   memantine (NAMENDA) 10 mg tablet Take 10 mg by mouth two (2) times a day. Yes Provider, Historical   OLANZapine (ZYPREXA) 7.5 mg tablet Take 7.5 mg by mouth nightly.    Yes Provider, Historical   metoprolol succinate (TOPROL-XL) 50 mg XL tablet TAKE ONE TABLET BY MOUTH AT BEDTIME 1/30/19  Yes Shirlene Garza MD   calcium phosphate-vitamin D3 (CITRACAL + D3, CALCIUM PHOS,) 250 mg calcium- 500 unit chew Take 1 Tab by mouth daily. 10/24/18  Yes Mame Mcdonnell NP   vit A/vit C/vit E/zinc/copper (PRESERVISION AREDS PO) Take  by mouth. Yes Provider, Historical   omeprazole (PRILOSEC) 20 mg capsule TAKE (1) CAPSULE BY MOUTH DAILY 5/31/18  Yes Zahira Mcdonnell NP   erythromycin (ILOTYCIN) ophthalmic ointment Administer  to both eyes nightly. Yes Provider, Historical   latanoprost (XALATAN) 0.005 % ophthalmic solution  9/25/15  Yes Provider, Historical   PHENobarbital (LUMINAL) 60 mg tablet Take 2 Tabs by mouth nightly. Max Daily Amount: 120 mg. 10/27/15  Yes Maria Elena Thomas MD   alprazolam Radha Gather) 0.25 mg tablet Take 0.125 mg by mouth two (2) times a day. Yes Other, MD [place]   busPIRone (BUSPAR) 15 mg tablet Take 15 mg by mouth two (2) times a day.    Yes Other, MD [place]   omeprazole (PRILOSEC) 20 mg capsule TAKE 1 CAPSULE BY MOUTH DAILY 3/30/20 5/19/20  Rob Sofia NP     No Known Allergies    Patient Active Problem List    Diagnosis Date Noted    Se[facility] disorder ([facility] 07/25/2013     Priority: 3 - Three    Sebaceous cyst 04/27/2020    Status post left hip replacement 10/22/2019    Abnormal brain MRI 09/17/2019    History of traumatic brain injury 04/05/2019    Gastroesophageal reflux disease without esophagitis 10/24/2018    Esophageal dysphagia 04/20/2018    Hyperglycemia 07/07/2017    Memory impairment 06/20/2017    Bipolar disorder in full remission ([facility] 06/20/2017    DOROTA (generalized anxiety disorder) 12/06/2016    Prediabetes 12/06/2016    Obesity (BMI 30.0-34.9) 12/06/2016    Mixed hyperlipidemia 12/06/2016    ACP (advance care planning) 03/07/2016    Intellectual disability     Seizures ([facility]    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     Hypertension     Hip arthritis 07/23/2013     Current Outpatient Medications   Medication Sig Dispense Refill    amLODIPine (NORVASC) 10 mg tablet TAKE 1 TABLET BY MOUTH DAILY 30 Tab 11    lisinopriL (PRINIVIL, ZESTRIL) 40 mg tablet TAKE 1 TABLET BY MOUTH DAILY 30 Tab 5    cetirizine (ZYRTEC) 10 mg tablet TAKE 1 TABLET BY MOUTH DAILY 31 Tab PRN    One Daily Multivitamin tablet TAKE 1 TABLET BY MOUTH DAILY 31 Tab PRN    indapamide (LOZOL) 1.25 mg tablet TAKE 1 TABLET BY MOUTH DAILY 31 Tab 5    cloNIDine HCL (CATAPRES) 0.3 mg tablet TAKE 1 TABLET BY MOUTH TWICE A DAY 58 Tab PRN    potassium chloride SR (KLOR-CON 10) 10 mEq tablet TAKE 1 TABLET BY MOUTH DAILY 29 Tab PRN    fluticasone propionate (FLONASE) 50 mcg/actuation nasal spray 2 Sprays by Both Nostrils route daily. 1 Bottle 11    hydrALAZINE (APRESOLINE) 100 mg tablet TAKE ONE (1) TABLET BY MOUTH THREE TIMES A DAY. 93 Tab PRN    atorvastatin (LIPITOR) 10 mg tablet Take 1 Tab by mouth daily. 30 Tab 5    azelastine (OPTIVAR) 0.05 % ophthalmic solution Administer 1 Drop to both eyes every evening. Use in affected eye(s)      miconazole nitrate (LOTRIMIN AF JOCK ITCH POWDER) 2 % aerp by Apply Externally route.  cycloSPORINE (RESTASIS) 0.05 % dpet Administer 1 Drop to both eyes every twelve (12) hours.  memantine (NAMENDA) 10 mg tablet Take 10 mg by mouth two (2) times a day.  OLANZapine (ZYPREXA) 7.5 mg tablet Take 7.5 mg by mouth nightly.  metoprolol succinate (TOPROL-XL) 50 mg XL tablet TAKE ONE TABLET BY MOUTH AT BEDTIME 31 Tab PRN    calcium phosphate-vitamin D3 (CITRACAL + D3, CALCIUM PHOS,) 250 mg calcium- 500 unit chew Take 1 Tab by mouth daily. 90 Tab 1    vit A/vit C/vit E/zinc/copper (PRESERVISION AREDS PO) Take  by mouth.  omeprazole (PRILOSEC) 20 mg capsule TAKE (1) CAPSULE BY MOUTH DAILY 31 Cap 10    erythromycin (ILOTYCIN) ophthalmic ointment Administer  to both eyes nightly.  latanoprost (XALATAN) 0.005 % ophthalmic solution       PHENobarbital (LUMINAL) 60 mg tablet Take 2 Tabs by mouth nightly.  Max Daily Amount: 120 mg. 60 Tab 5    alprazolam (XANAX) 0.25 mg tablet Take 0.125 mg by mouth two (2) times a day.  busPIRone (BUSPAR) 15 mg tablet Take 15 mg by mouth two (2) times a day.        No Known Allergies  Past Medical History:   Diagnosis Date    Anxiety     Arthritis     Brain trauma ([facility]     yuri kelly md psychiatry    Constipation     Glaucoma     Hypercholesterolemia     Hypertension     Intellectual disability     [facility]    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     rp  abhay walker -[phone]    S/P colonoscopy 4-6-12    rt n    S/P total hip arthroplasty     left    Sebaceous cyst 4/27/2020    Seizures ([facility]      Social History     Tobacco Use    Smoking status: Never Smoker    Smokeless tobacco: Never Used   Substance Use Topics    Alcohol use: No     Alcohol/week: 0.0 standard drinks       ROS    Objective:   Vital Signs: (As obtained by patient/caregiver at home)  Visit Vitals  Height 5' 10\" (1.778 m)   Weight 199 lb (90.3 kg)   Body Mass Index 28.55 kg/m²        [INSTRUCTIONS:  \"[x]\" Indicates a positive item  \"[]\" Indicates a negative item  -- DELETE ALL ITEMS NOT EXAMINED]    Constitutional: [x] Appears well-developed and well-nourished [x] No apparent distress      [] Abnormal -     Mental status: [x] Alert and awake  [x] Oriented to person/place/time [x] Able to follow commands    [] Abnormal -     Eyes:   EOM    [x]  Normal    [] Abnormal -   Sclera  [x]  Normal    [] Abnormal -          Discharge [x]  None visible   [] Abnormal -     HENT: [x] Normocephalic, atraumatic  [] Abnormal -   [x] Mouth/Throat: Mucous membranes are moist    External Ears [x] Normal  [] Abnormal -    Neck: [x] No visualized mass [] Abnormal -     Pulmonary/Chest: [x] Respiratory effort normal   [x] No visualized signs of difficulty breathing or respiratory distress        [] Abnormal -      Musculoskeletal:   [x] Normal gait with no signs of ataxia         [x] Normal range of motion of neck        [] Abnormal -     Neurological:        [x] No Facial Asymmetry (Cranial nerve 7 motor function) (limited exam due to video visit)          [x] No gaze palsy        [] Abnormal -          Skin:        [x] No significant exanthematous lesions or discoloration noted on facial skin         [] Abnormal -            Psychiatric:       [x] Normal Affect [] Abnormal -        [x] No Hallucinations    Other pertinent observable physical exam findings:-        We discussed the expected course, resolution and complications of the diagnosis(es) in detail. Medication risks, benefits, costs, interactions, and alternatives were discussed as indicated. I advised him to contact the office if his condition worsens, changes or fails to improve as anticipated. He expressed understanding with the diagnosis(es) and plan. Brian Navarro is a 62 y.o. male who was evaluated by a video visit encounter for concerns as above. Patient identification was verified prior to start of the visit. A caregiver was present when appropriate. Due to this being a TeleHealth encounter (During BAVT-76 public health emergency), evaluation of the following organ systems was limited: Vitals/Constitutional/EENT/Resp/CV/GI//MS/Neuro/Skin/Heme-Lymph-Imm. Pursuant to the emergency declaration under the [facility] [place], [facility] waiver authority and the [employer] General Act, this Virtual  Visit was conducted, with patient's (and/or legal guardian's) consent, to reduce the patient's risk of exposure to COVID-19 and provide necessary medical care. Services were provided through a video synchronous discussion virtually to substitute for in-person clinic visit. Patient and provider were located at their individual homes.       Nawaf Diehl DO

## 2020-05-19 NOTE — PROGRESS NOTES
Health Maintenance Due   Topic Date Due    Shingrix Vaccine Age 49> (1 of 2) 10/22/2008    FOBT Q1Y Age 54-65  04/07/2017       Chief Complaint   Patient presents with    Hypertension    Cholesterol Problem    Mental Health Problem       1. Have you been to the ER, urgent care clinic since your last visit? Hospitalized since your last visit? No    2. Have you seen or consulted any other health care providers outside of the [address] [place] [address] since your last visit? Include any pap smears or colon screening. No    3) Do you have an Advance Directive on file? no    4) Are you interested in receiving information on Advance Directives? NO      Patient is accompanied by self I have received verbal consent from Willard Bourgeois to discuss any/all medical information while they are present in the room.

## 2020-06-09 ENCOUNTER — OFFICE VISIT (OUTPATIENT)
Dept: NEUROLOGY | Age: 60
End: 2020-06-09

## 2020-06-09 VITALS
DIASTOLIC BLOOD PRESSURE: 80 MMHG | TEMPERATURE: 98.1 F | OXYGEN SATURATION: 98 % | HEART RATE: 68 BPM | HEIGHT: 70 IN | SYSTOLIC BLOOD PRESSURE: 138 MMHG | WEIGHT: 195.6 LBS | BODY MASS INDEX: 28 KG/M2

## 2020-06-09 DIAGNOSIS — G40.109 TEMPORAL LOBE EPILEPSY WITH MESIAL TEMPORAL SCLEROSIS (HCC): Primary | ICD-10-CM

## 2020-06-09 DIAGNOSIS — G40.909 SEIZURE DISORDER (HCC): ICD-10-CM

## 2020-06-09 DIAGNOSIS — R41.3 MEMORY LOSS DUE TO MEDICAL CONDITION: ICD-10-CM

## 2020-06-09 DIAGNOSIS — G93.81 TEMPORAL LOBE EPILEPSY WITH MESIAL TEMPORAL SCLEROSIS (HCC): Primary | ICD-10-CM

## 2020-06-09 RX ORDER — PHENOBARBITAL 60 MG/1
120 TABLET ORAL
Qty: 180 TAB | Refills: 3 | Status: SHIPPED | OUTPATIENT
Start: 2020-06-09 | End: 2021-03-31

## 2020-06-09 NOTE — PROGRESS NOTES
Chief Complaint   Patient presents with    Follow-up     brain injury, here with caregiver who states \"no new issues\"

## 2020-06-09 NOTE — PROGRESS NOTES
Chief Complaint   Patient presents with    Follow-up     brain injury, here with caregiver who states \"no new issues\"       HPI    24-year-old gentleman following up. He is here with his . I saw him a year ago and his condition has been stable since then. He lives in a group home. Normally he works at the [employer] but due to the current pandemic he has not been working yet. he has memory loss and he needs to be reminded that he is not working. He stays busy by keeping his apartment clean. He has social peers. He does not drive. No seizures being reported.  says there have been no new developments and he is stable. Since I saw him last EEG was negative. MRI does have some congenital findings. He can do his own ADLs such as dressing toileting and feeding. Medications are administered to him. Background:  Mr. Tamara Lema is a 56-year-old gentleman who lives in group home,independently, here for memory loss. He is here with his , Janee Ruiz. She has known him since 2008. He has history of psychiatric disease manifesting as bipolar disorder, history of epilepsy, history of brain injury and intellectual disability. The nature of his brain injury per  is something from birth but we do not know the details. 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 Overall his condition is stable. His last known seizure is unknown. His  tells me he is essentially his baseline. He can do his own ADLs such as hygiene, eating, dressing. 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 He does not drive. His medications are administered to him. He does have supervision where he lives due to occasional wandering but no behavioral issues. He is followed by Dr. Katrin Vance in psychiatry. Namenda had been started last year for memory loss.   He is on phenobarbital at bedtime. Review of Systems   Neurological: Negative for seizures. Psychiatric/Behavioral: Positive for memory loss. All other systems reviewed and are negative.       Past Medical History:   Diagnosis Date    Anxiety     Arthritis     [facility]     yuri kelly md psychiatry    Constipation     Glaucoma     Hypercholesterolemia     Hypertension     Intellectual disability     [facility] nelson [facility]    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     rp  abhay walker -[phone]    S/P colonoscopy 4-6-12    rt n    S/P total hip arthroplasty     left    Sebaceous cyst 4/27/2020    Seizures ([facility]      Family History   Problem Relation Age of Onset    No Known Problems Mother     No Known Problems Father      Social History     Socioeconomic History    Marital status: SINGLE     Spouse name: Not on file    Number of children: Not on file    Years of education: Not on file    Highest education level: Not on file   Occupational History    Not on file   Social Needs    Financial resource strain: Not on file    Food insecurity     Worry: Not on file     Inability: Not on file    Transportation needs     Medical: Not on file     Non-medical: Not on file   Tobacco Use    Smoking status: Never Smoker    Smokeless tobacco: Never Used   Substance and Sexual Activity    Alcohol use: No     Alcohol/week: 0.0 standard drinks    Drug use: No    Sexual activity: Never   Lifestyle    Physical activity     Days per week: Not on file     Minutes per session: Not on file    Stress: Not on file   Relationships    Social connections     Talks on phone: Not on file     Gets together: Not on file     Attends Restoration service: Not on file     Active member of club or organization: Not on file     Attends meetings of clubs or organizations: Not on file     Relationship status: Not on file    Intimate partner violence     Fear of current or ex partner: Not on file Emotionally abused: Not on file     Physically abused: Not on file     Forced sexual activity: Not on file   Other Topics Concern    Not on file   Social History Narrative    Not on file     No Known Allergies      Current Outpatient Medications   Medication Sig    PHENobarbitaL (LUMINAL) 60 mg tablet Take 2 Tabs by mouth nightly. Max Daily Amount: 120 mg.    amLODIPine (NORVASC) 10 mg tablet TAKE 1 TABLET BY MOUTH DAILY    lisinopriL (PRINIVIL, ZESTRIL) 40 mg tablet TAKE 1 TABLET BY MOUTH DAILY    cetirizine (ZYRTEC) 10 mg tablet TAKE 1 TABLET BY MOUTH DAILY    One Daily Multivitamin tablet TAKE 1 TABLET BY MOUTH DAILY    indapamide (LOZOL) 1.25 mg tablet TAKE 1 TABLET BY MOUTH DAILY    cloNIDine HCL (CATAPRES) 0.3 mg tablet TAKE 1 TABLET BY MOUTH TWICE A DAY    potassium chloride SR (KLOR-CON 10) 10 mEq tablet TAKE 1 TABLET BY MOUTH DAILY    fluticasone propionate (FLONASE) 50 mcg/actuation nasal spray 2 Sprays by Both Nostrils route daily.  hydrALAZINE (APRESOLINE) 100 mg tablet TAKE ONE (1) TABLET BY MOUTH THREE TIMES A DAY.  atorvastatin (LIPITOR) 10 mg tablet Take 1 Tab by mouth daily.  azelastine (OPTIVAR) 0.05 % ophthalmic solution Administer 1 Drop to both eyes every evening. Use in affected eye(s)    miconazole nitrate (LOTRIMIN AF JOCK ITCH POWDER) 2 % aerp by Apply Externally route.  cycloSPORINE (RESTASIS) 0.05 % dpet Administer 1 Drop to both eyes every twelve (12) hours.  memantine (NAMENDA) 10 mg tablet Take 10 mg by mouth two (2) times a day.  OLANZapine (ZYPREXA) 7.5 mg tablet Take 7.5 mg by mouth nightly.  metoprolol succinate (TOPROL-XL) 50 mg XL tablet TAKE ONE TABLET BY MOUTH AT BEDTIME    calcium phosphate-vitamin D3 (CITRACAL + D3, CALCIUM PHOS,) 250 mg calcium- 500 unit chew Take 1 Tab by mouth daily.  vit A/vit C/vit E/zinc/copper (PRESERVISION AREDS PO) Take  by mouth.     omeprazole (PRILOSEC) 20 mg capsule TAKE (1) CAPSULE BY MOUTH DAILY    erythromycin (ILOTYCIN) ophthalmic ointment Administer  to both eyes nightly.  latanoprost (XALATAN) 0.005 % ophthalmic solution     alprazolam (XANAX) 0.25 mg tablet Take 0.125 mg by mouth two (2) times a day.  busPIRone (BUSPAR) 15 mg tablet Take 15 mg by mouth two (2) times a day. No current facility-administered medications for this visit. Neurologic Exam    Visit Vitals  /80   Pulse 68   Temp 98.1 °F (36.7 °C) (Oral)   Ht 5' 10\" (1.778 m)   Wt 88.7 kg (195 lb 9.6 oz)   SpO2 98%   BMI 28.07 kg/m²       Assessment and Plan   Diagnoses and all orders for this visit:    1. 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 Memory loss due to medical condition    3. Seizure disorder (HCC)  -     PHENobarbitaL (LUMINAL) 60 mg tablet; Take 2 Tabs by mouth nightly. Max Daily Amount: 120 mg.  -     PHENOBARBITAL    26-year-old gentleman who has likely lesional epilepsy currently stable and controlled. Memory loss is persistent and he is disabled. No change to phenobarbital at bedtime but I will check a current level. His safety seems intact. 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 No signs of toxicity on exam.  Overall he is doing very well. We will see him annually or sooner if something acutely changes. I reviewed and decided to continue the current medications. This clinical note was dictated with an electronic dictation software that can make unintentional errors. If there are any questions, please contact me directly for clarification.       2 [facility], [facility] [address]  Diplomate ABPN

## 2020-06-11 LAB — PHENOBARB SERPL-MCNC: 24 UG/ML (ref 15–40)

## 2020-06-30 RX ORDER — ATORVASTATIN CALCIUM 10 MG/1
TABLET, FILM COATED ORAL
Qty: 30 TAB | Refills: 11 | Status: SHIPPED | OUTPATIENT
Start: 2020-06-30 | End: 2021-05-28

## 2020-06-30 RX ORDER — METOPROLOL SUCCINATE 50 MG/1
TABLET, EXTENDED RELEASE ORAL
Qty: 30 TAB | Refills: 11 | Status: SHIPPED | OUTPATIENT
Start: 2020-06-30 | End: 2021-05-28

## 2020-07-01 ENCOUNTER — HOSPITAL ENCOUNTER (OUTPATIENT)
Dept: LAB | Age: 60
Discharge: HOME | End: 2020-07-01
Payer: MEDICARE

## 2020-07-01 PROCEDURE — 84450 TRANSFERASE (AST) (SGOT): CPT

## 2020-07-01 PROCEDURE — 84460 ALANINE AMINO (ALT) (SGPT): CPT

## 2020-07-01 PROCEDURE — 36415 COLL VENOUS BLD VENIPUNCTURE: CPT

## 2020-07-01 PROCEDURE — 80061 LIPID PANEL: CPT

## 2020-07-02 LAB
ALT SERPL-CCNC: 18 IU/L (ref 0–44)
AST SERPL-CCNC: 19 IU/L (ref 0–40)
CHOLEST SERPL-MCNC: 144 MG/DL (ref 100–199)
HDLC SERPL-MCNC: 36 MG/DL
INTERPRETATION, 910389: NORMAL
LDLC SERPL CALC-MCNC: 71 MG/DL (ref 0–99)
TRIGL SERPL-MCNC: 185 MG/DL (ref 0–149)
VLDLC SERPL CALC-MCNC: 37 MG/DL (ref 5–40)

## 2020-07-22 NOTE — PROGRESS NOTES
Improvements in total cholesterol and LDL; liver enzymes stable. Triglycerides are elevated. Watch diet for foods high in sugar. Exercise as tolerated. Continue Lipitor, as ordered.

## 2020-09-29 ENCOUNTER — OFFICE VISIT (OUTPATIENT)
Dept: INTERNAL MEDICINE CLINIC | Age: 60
End: 2020-09-29
Payer: MEDICARE

## 2020-09-29 VITALS
WEIGHT: 200 LBS | DIASTOLIC BLOOD PRESSURE: 82 MMHG | HEIGHT: 70 IN | HEART RATE: 60 BPM | TEMPERATURE: 97.7 F | BODY MASS INDEX: 28.63 KG/M2 | SYSTOLIC BLOOD PRESSURE: 132 MMHG | OXYGEN SATURATION: 98 % | RESPIRATION RATE: 18 BRPM

## 2020-09-29 DIAGNOSIS — Z87.820 HISTORY OF TRAUMATIC BRAIN INJURY: ICD-10-CM

## 2020-09-29 DIAGNOSIS — R41.3 MEMORY IMPAIRMENT: ICD-10-CM

## 2020-09-29 DIAGNOSIS — E78.2 MIXED HYPERLIPIDEMIA: ICD-10-CM

## 2020-09-29 DIAGNOSIS — G40.909 SEIZURE DISORDER (HCC): ICD-10-CM

## 2020-09-29 DIAGNOSIS — F79 INTELLECTUAL DISABILITY: ICD-10-CM

## 2020-09-29 DIAGNOSIS — I10 ESSENTIAL HYPERTENSION: Primary | ICD-10-CM

## 2020-09-29 DIAGNOSIS — Z00.00 MEDICARE ANNUAL WELLNESS VISIT, SUBSEQUENT: ICD-10-CM

## 2020-09-29 DIAGNOSIS — F31.70 BIPOLAR DISORDER IN FULL REMISSION, MOST RECENT EPISODE UNSPECIFIED TYPE (HCC): ICD-10-CM

## 2020-09-29 PROCEDURE — 3017F COLORECTAL CA SCREEN DOC REV: CPT | Performed by: INTERNAL MEDICINE

## 2020-09-29 PROCEDURE — G9717 DOC PT DX DEP/BP F/U NT REQ: HCPCS | Performed by: INTERNAL MEDICINE

## 2020-09-29 PROCEDURE — 99214 OFFICE O/P EST MOD 30 MIN: CPT | Performed by: INTERNAL MEDICINE

## 2020-09-29 PROCEDURE — G8419 CALC BMI OUT NRM PARAM NOF/U: HCPCS | Performed by: INTERNAL MEDICINE

## 2020-09-29 PROCEDURE — G8752 SYS BP LESS 140: HCPCS | Performed by: INTERNAL MEDICINE

## 2020-09-29 PROCEDURE — G8427 DOCREV CUR MEDS BY ELIG CLIN: HCPCS | Performed by: INTERNAL MEDICINE

## 2020-09-29 PROCEDURE — G8754 DIAS BP LESS 90: HCPCS | Performed by: INTERNAL MEDICINE

## 2020-09-29 PROCEDURE — G0463 HOSPITAL OUTPT CLINIC VISIT: HCPCS | Performed by: INTERNAL MEDICINE

## 2020-09-29 PROCEDURE — G0439 PPPS, SUBSEQ VISIT: HCPCS | Performed by: INTERNAL MEDICINE

## 2020-09-29 NOTE — PROGRESS NOTES
Schedule of Personalized Health Plan  (Provide Copy to Patient)  The best way to stay healthy is to live a healthy lifestyle. A healthy lifestyle includes regular exercise, eating a well-balanced diet, keeping a healthy weight and not smoking. Regular physical exams and screening tests are another important way to take care of yourself. Preventive exams provided by health care providers can find health problems early when treatment works best and can keep you from getting certain diseases or illnesses. Preventive services include exams, lab tests, screenings, shots, monitoring and information to help you take care of your own health. All people over 65 should have a pneumonia shot. Pneumonia shots are usually only needed once in a lifetime unless your doctor decides differently. All people over 65 should have a yearly flu shot. People over 65 are at medium to high risk for Hepatitis B. Three shots are needed for complete protection. In addition to your physical exam, some screening tests are recommended:    Bone mass measurement (dexa scan) is recommended every two years if you have certain risk factors, such as personal history of vertebral fracture or chronic steroid medication use    Diabetes Mellitus screening is recommended every year. Glaucoma is an eye disease caused by high pressure in the eye. An eye exam is recommended every year. Cardiovascular screening tests that check your cholesterol and other blood fat (lipid) levels are recommended every five years. Colorectal Cancer screening tests help to find pre-cancerous polyps (growths in the colon) so they can be removed before they turn into cancer. Tests ordered for screening depend on your personal and family history risk factors.     Screening for Prostate Cancer is recommended yearly with a digital rectal exam and/or a PSA test    Here is a list of your current Health Maintenance items with a due date:  Health Maintenance Topic Date Due    Shingrix Vaccine Age 49> (1 of 2) 10/22/2008    FOBT Q1Y Age 50-75  04/07/2017    Flu Vaccine (1) 06/30/2021 (Originally 9/1/2020)    A1C test (Diabetic or Prediabetic)  10/01/2021 (Originally 10/2/2019)    Lipid Screen  07/01/2021    Medicare Yearly Exam  09/30/2021    DTaP/Tdap/Td series (2 - Td) 05/20/2026    Hepatitis C Screening  Addressed    Pneumococcal 0-64 years  Aged Out       This is the Subsequent Medicare Annual Wellness Exam, performed 12 months or more after the Initial AWV or the last Subsequent AWV    I have reviewed the patient's medical history in detail and updated the computerized patient record. History     Patient Active Problem List   Diagnosis Code    Hip arthritis M16.10    Seizure disorder ([facility] G40.909    Seizures ([facility] R56.9   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 F70    Hypertension I10    Intellectual disability F68    ACP (advance care planning) Z70.80    DOROTA (generalized anxiety disorder) F41.1    Prediabetes R73.03    Obesity (BMI 30.0-34. 9) E66.9    Mixed hyperlipidemia E78.2    Memory impairment R41.3    Bipolar disorder in full remission ([address] F31.70    Hyperglycemia R73.9    Esophageal dysphagia R13.10    Gastroesophageal reflux disease without esophagitis K21.9    History of traumatic brain injury Z87.820    Abnormal brain MRI R90.89    Status post left hip replacement Z96.642    Sebaceous cyst L72.3     Past Medical History:   Diagnosis Date    Anxiety     Arthritis     Brain trauma ([facility]     yuri kelly md psychiatry    Constipation     Glaucoma     Hypercholesterolemia     Hypertension     Intellectual disability     [facility]    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     rp  abhay walker -[phone]    S/P colonoscopy 4-6-12    rt n    S/P total hip arthroplasty     left    Sebaceous cyst 4/27/2020    Seizures ([address]       Past Surgical History:   Procedure Laterality Date  COLONOSCOPY N/A 12/10/2019    COLONOSCOPY performed by Medora Gitelman, MD at [address] [place] [address] ENDOSCOPY     Current Outpatient Medications   Medication Sig Dispense Refill    metoprolol succinate (TOPROL-XL) 50 mg XL tablet TAKE ONE TABLET BY MOUTH AT BEDTIME 30 Tab 11    atorvastatin (LIPITOR) 10 mg tablet TAKE ONE TABLET DAILY (FOR CHOLESTEROL) 30 Tab 11    PHENobarbitaL (LUMINAL) 60 mg tablet Take 2 Tabs by mouth nightly. Max Daily Amount: 120 mg. 180 Tab 3    amLODIPine (NORVASC) 10 mg tablet TAKE 1 TABLET BY MOUTH DAILY 30 Tab 11    lisinopriL (PRINIVIL, ZESTRIL) 40 mg tablet TAKE 1 TABLET BY MOUTH DAILY 30 Tab 5    cetirizine (ZYRTEC) 10 mg tablet TAKE 1 TABLET BY MOUTH DAILY 31 Tab PRN    One Daily Multivitamin tablet TAKE 1 TABLET BY MOUTH DAILY 31 Tab PRN    indapamide (LOZOL) 1.25 mg tablet TAKE 1 TABLET BY MOUTH DAILY 31 Tab 5    cloNIDine HCL (CATAPRES) 0.3 mg tablet TAKE 1 TABLET BY MOUTH TWICE A DAY 58 Tab PRN    potassium chloride SR (KLOR-CON 10) 10 mEq tablet TAKE 1 TABLET BY MOUTH DAILY 29 Tab PRN    fluticasone propionate (FLONASE) 50 mcg/actuation nasal spray 2 Sprays by Both Nostrils route daily. 1 Bottle 11    hydrALAZINE (APRESOLINE) 100 mg tablet TAKE ONE (1) TABLET BY MOUTH THREE TIMES A DAY. 93 Tab PRN    azelastine (OPTIVAR) 0.05 % ophthalmic solution Administer 1 Drop to both eyes every evening. Use in affected eye(s)      cycloSPORINE (RESTASIS) 0.05 % dpet Administer 1 Drop to both eyes every twelve (12) hours.  memantine (NAMENDA) 10 mg tablet Take 10 mg by mouth two (2) times a day.  OLANZapine (ZYPREXA) 7.5 mg tablet Take 7.5 mg by mouth nightly.  calcium phosphate-vitamin D3 (CITRACAL + D3, CALCIUM PHOS,) 250 mg calcium- 500 unit chew Take 1 Tab by mouth daily. 90 Tab 1    vit A/vit C/vit E/zinc/copper (PRESERVISION AREDS PO) Take  by mouth.       omeprazole (PRILOSEC) 20 mg capsule TAKE (1) CAPSULE BY MOUTH DAILY 31 Cap 10    latanoprost (XALATAN) 0.005 % ophthalmic solution       alprazolam (XANAX) 0.25 mg tablet Take 0.125 mg by mouth two (2) times a day.  busPIRone (BUSPAR) 15 mg tablet Take 15 mg by mouth two (2) times a day. No Known Allergies    Family History   Problem Relation Age of Onset    No Known Problems Mother     No Known Problems Father      Social History     Tobacco Use    Smoking status: Never Smoker    Smokeless tobacco: Never Used   Substance Use Topics    Alcohol use: No     Alcohol/week: 0.0 standard drinks       Depression Risk Factor Screening:     3 most recent PHQ Screens 9/29/2020   Little interest or pleasure in doing things Not at all   Feeling down, depressed, irritable, or hopeless Not at all   Total Score PHQ 2 0   Trouble falling or staying asleep, or sleeping too much Not at all   Feeling tired or having little energy Not at all   Poor appetite, weight loss, or overeating Not at all   Feeling bad about yourself - or that you are a failure or have let yourself or your family down Not at all   Trouble concentrating on things such as school, work, reading, or watching TV Not at all   Moving or speaking so slowly that other people could have noticed; or the opposite being so fidgety that others notice Not at all   Thoughts of being better off dead, or hurting yourself in some way Not at all   PHQ 9 Score 0       Alcohol Risk Screen   Do you average more than 2 drinks per night or 14 drinks a week: No    On any one occasion in the past three months have you have had more than 4 drinks containing alcohol:  No        Functional Ability and Level of Safety:   Hearing: Hearing is good. Activities of Daily Living: The home contains: no safety equipment. Patient needs help with:  transportation, shopping, preparing meals, laundry, housework, managing medications and managing money     Ambulation: with no difficulty     Fall Risk:  Fall Risk Assessment, last 12 mths 9/29/2020   Able to walk? Yes   Fall in past 12 months? No     Abuse Screen:  Patient is not abused       Cognitive Screening   Has your family/caregiver stated any concerns about your memory: yes - A+O x 2     Cognitive Screening: Abnormal - A+O x 2    Patient Care Team   Patient Care Team:  Ailyn Damon DO as PCP - General (Internal Medicine)  Ailyn Damon DO as PCP - [facility]  Hansel Dill MD as Physician (Ophthalmology)    Assessment/Plan   Education and counseling provided:  Are appropriate based on today's review and evaluation    Diagnoses and all orders for this visit:    1. Essential hypertension    2. Mixed hyperlipidemia    3. Seizure disorder ([address]    4. History of traumatic brain injury    5. Bipolar disorder in full remission, most recent episode unspecified type ([address]    6. Memory impairment    7. Intellectual disability    8. Medicare annual wellness visit, subsequent        Health Maintenance Due   Topic Date Due    Shingrix Vaccine Age 49> (1 of 2) 10/22/2008    FOBT Q1Y Age 54-65  04/07/2017     This is the Subsequent Medicare Annual Wellness Exam, performed 12 months or more after the Initial AWV or the last Subsequent AWV    I have reviewed the patient's medical history in detail and updated the computerized patient record. History     Patient Active Problem List   Diagnosis Code    Hip arthritis M16.10    Seizure disorder ([address] G40.909    Seizures ([address] R56.9   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 F70    Hypertension I10    Intellectual disability F68    ACP (advance care planning) Z70.80    DOROTA (generalized anxiety disorder) F41.1    Prediabetes R73.03    Obesity (BMI 30.0-34. 9) E66.9    Mixed hyperlipidemia E78.2    Memory impairment R41.3    Bipolar disorder in full remission ([address] F31.70    Hyperglycemia R73.9    Esophageal dysphagia R13.10    Gastroesophageal reflux disease without esophagitis K21.9    History of traumatic brain injury Z87.820    Abnormal brain MRI R90.89    Status post left hip replacement Z96.642    Sebaceous cyst L72.3     Past Medical History:   Diagnosis Date    Anxiety     Arthritis     Brain trauma ([facility]     yuri kelly md psychiatry    Constipation     Glaucoma     Hypercholesterolemia     Hypertension     Intellectual disability      - [facility]    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     len wright -[phone]    S/P colonoscopy 4-6-12    rt n    S/P total hip arthroplasty     left    Sebaceous cyst 4/27/2020    Seizures ([facility]       Past Surgical History:   Procedure Laterality Date    COLONOSCOPY N/A 12/10/2019    COL[facility]COPY performed by Ranjan Le [facility] at [facility] ENDOSCOPY     Current Outpatient Medications   Medication Sig Dispense Refill    metoprolol succinate (TOPROL-XL) 50 mg XL tablet TAKE ONE TABLET BY MOUTH AT BEDTIME 30 Tab 11    atorvastatin (LIPITOR) 10 mg tablet TAKE ONE TABLET DAILY (FOR CHOLESTEROL) 30 Tab 11    PHENobarbitaL (LUMINAL) 60 mg tablet Take 2 Tabs by mouth nightly. Max Daily Amount: 120 mg. 180 Tab 3    amLODIPine (NORVASC) 10 mg tablet TAKE 1 TABLET BY MOUTH DAILY 30 Tab 11    lisinopriL (PRINIVIL, ZESTRIL) 40 mg tablet TAKE 1 TABLET BY MOUTH DAILY 30 Tab 5    cetirizine (ZYRTEC) 10 mg tablet TAKE 1 TABLET BY MOUTH DAILY 31 Tab PRN    One Daily Multivitamin tablet TAKE 1 TABLET BY MOUTH DAILY 31 Tab PRN    indapamide (LOZOL) 1.25 mg tablet TAKE 1 TABLET BY MOUTH DAILY 31 Tab 5    cloNIDine HCL (CATAPRES) 0.3 mg tablet TAKE 1 TABLET BY MOUTH TWICE A DAY 58 Tab PRN    potassium chloride SR (KLOR-CON 10) 10 mEq tablet TAKE 1 TABLET BY MOUTH DAILY 29 Tab PRN    fluticasone propionate (FLONASE) 50 mcg/actuation nasal spray 2 Sprays by Both Nostrils route daily. 1 Bottle 11    hydrALAZINE (APRESOLINE) 100 mg tablet TAKE ONE (1) TABLET BY MOUTH THREE TIMES A DAY. 93 Tab PRN    azelastine (OPTIVAR) 0.05 % ophthalmic solution Administer 1 Drop to both eyes every evening.  Use in affected eye(s)      cycloSPORINE (RESTASIS) 0.05 % dpet Administer 1 Drop to both eyes every twelve (12) hours.  memantine (NAMENDA) 10 mg tablet Take 10 mg by mouth two (2) times a day.  OLANZapine (ZYPREXA) 7.5 mg tablet Take 7.5 mg by mouth nightly.  calcium phosphate-vitamin D3 (CITRACAL + D3, CALCIUM PHOS,) 250 mg calcium- 500 unit chew Take 1 Tab by mouth daily. 90 Tab 1    vit A/vit C/vit E/zinc/copper (PRESERVISION AREDS PO) Take  by mouth.  omeprazole (PRILOSEC) 20 mg capsule TAKE (1) CAPSULE BY MOUTH DAILY 31 Cap 10    latanoprost (XALATAN) 0.005 % ophthalmic solution       alprazolam (XANAX) 0.25 mg tablet Take 0.125 mg by mouth two (2) times a day.  busPIRone (BUSPAR) 15 mg tablet Take 15 mg by mouth two (2) times a day.        No Known Allergies    Family History   Problem Relation Age of Onset    No Known Problems Mother     No Known Problems Father      Social History     Tobacco Use    Smoking status: Never Smoker    Smokeless tobacco: Never Used   Substance Use Topics    Alcohol use: No     Alcohol/week: 0.0 standard drinks       Depression Risk Factor Screening:     3 most recent PHQ Screens 9/29/2020   Little interest or pleasure in doing things Not at all   Feeling down, depressed, irritable, or hopeless Not at all   Total Score PHQ 2 0   Trouble falling or staying asleep, or sleeping too much Not at all   Feeling tired or having little energy Not at all   Poor appetite, weight loss, or overeating Not at all   Feeling bad about yourself - or that you are a failure or have let yourself or your family down Not at all   Trouble concentrating on things such as school, work, reading, or watching TV Not at all   Moving or speaking so slowly that other people could have noticed; or the opposite being so fidgety that others notice Not at all   Thoughts of being better off dead, or hurting yourself in some way Not at all   PHQ 9 Score 0       Alcohol Risk Screen   Do you average more than 2 drinks per night or 14 drinks a week: No    On any one occasion in the past three months have you have had more than 4 drinks containing alcohol:  No        Functional Ability and Level of Safety:   Hearing: Hearing is good. Activities of Daily Living: The home contains: no safety equipment. Patient needs help with:  transportation, shopping, preparing meals, laundry, housework, managing medications and managing money     Ambulation: with no difficulty     Fall Risk:  Fall Risk Assessment, last 12 mths 9/29/2020   Able to walk? Yes   Fall in past 12 months? No     Abuse Screen:  Patient is not abused       Cognitive Screening   Has your family/caregiver stated any concerns about your memory: yes     Cognitive Screening: A+O x 2    Patient Care Team   Patient Care Team:  Hannah Pathak DO as PCP - General (Internal Medicine)  Hannah Pathak DO as PCP - [facility]  Guido Natarajan MD as Physician (Ophthalmology)    Assessment/Plan   Education and counseling provided:  Are appropriate based on today's review and evaluation    Diagnoses and all orders for this visit:    1. Essential hypertension    2. Mixed hyperlipidemia    3. Seizure disorder ([address]    4. History of traumatic brain injury    5. Bipolar disorder in full remission, most recent episode unspecified type ([address]    6. Memory impairment    7. Intellectual disability    8.  Medicare annual wellness visit, subsequent        Health Maintenance Due   Topic Date Due    Shingrix Vaccine Age 50> (1 of 2) 10/22/2008    FOBT Q1Y Age 54-65  04/07/2017

## 2020-09-29 NOTE — PROGRESS NOTES
ADVISED PATIENT OF THE FOLLOWING HEALTH MAINTAINCE DUE  Health Maintenance Due   Topic Date Due    Shingrix Vaccine Age 49> (1 of 2) 10/22/2008    FOBT Q1Y Age 50-75  04/07/2017    Flu Vaccine (1) 09/01/2020    Medicare Yearly Exam  10/22/2020      Chief Complaint   Patient presents with    Complete Physical     needs forms completed    Hypertension    Seizure    Other     MR        1. Have you been to the ER, urgent care clinic since your last visit? Hospitalized since your last visit? No    2. Have you seen or consulted any other health care providers outside of the [address] [place] [address] since your last visit? Include any DEXA scan, mammography  or colon screening. No    3. Do you have an Advance Directive on file? no    4. Do you have a DNR on file? no    Patient is accompanied by self and adult caretaker I have received verbal consent from Ula Boeck to discuss any/all medical information while they are present in the room. No flowsheet data found. [address] [place] [address]  Phone: [phone] Fax: [phone]        Patient reminded during visit to bring all medication bottles, OTC medications to all appointments.

## 2020-09-30 NOTE — PROGRESS NOTES
HISTORY OF PRESENT ILLNESS  Diana Davis is a 62 y.o. male. Pt. comes in with his  from his group home for f/u. Has a few chronic medical issues as documented. BP is stable on medications. History of TBI with intellectual disability and some memory impairment. Followed by neurology and psychiatry. No recent seizures. No recent ER or hospital is. Depends a staff for some ADLs. Denies any acute issues today. PMH/PSH/Allergies/Social History/medication list and most recent studies reviewed with patient. Tobacco use: No  Alcohol use: Social    Reports compliance with medications and diet. Trying to be active physically to control weight. Reports no other new c/o. HPI    Review of Systems   Constitutional: Negative. HENT: Negative. Eyes: Negative. Respiratory: Negative for shortness of breath. Cardiovascular: Negative for chest pain and leg swelling. Gastrointestinal: Positive for heartburn. Negative for abdominal pain and nausea. Genitourinary: Negative for dysuria. Musculoskeletal: Negative for falls and joint pain. Skin: Negative. Neurological: Positive for seizures. Negative for dizziness, sensory change and headaches. Endo/Heme/Allergies: Negative. Psychiatric/Behavioral: Positive for memory loss. Negative for depression. The patient is nervous/anxious. The patient does not have insomnia. All other systems reviewed and are negative. Physical Exam  Vitals signs and nursing note reviewed. Constitutional:       General: He is not in acute distress. Appearance: He is well-developed. Comments: Pleasant man overweight   HENT:      Head: Normocephalic and atraumatic. Mouth/Throat:      Mouth: Mucous membranes are moist.      Pharynx: Oropharynx is clear. Eyes:      General: No scleral icterus. Conjunctiva/sclera: Conjunctivae normal.      Comments: Poor vision   Neck:      Musculoskeletal: Normal range of motion and neck supple. Thyroid: No thyromegaly. Vascular: No JVD. Cardiovascular:      Rate and Rhythm: Normal rate and regular rhythm. Heart sounds: Normal heart sounds. No murmur. Pulmonary:      Effort: Pulmonary effort is normal. No respiratory distress. Breath sounds: Normal breath sounds. No wheezing or rales. Abdominal:      General: Bowel sounds are normal. There is no distension. Palpations: Abdomen is soft. Tenderness: There is no abdominal tenderness. Comments: obese   Musculoskeletal:         General: No tenderness. Skin:     General: Skin is warm and dry. Findings: No rash. Neurological:      Mental Status: He is alert. Coordination: Coordination normal.      Comments: A+O to name, Wes, not date   Doesn't know president's name   Psychiatric:         Behavior: Behavior normal.      Comments: Slow speech  Chronic cognitive deficit         ASSESSMENT and PLAN  Diagnoses and all orders for this visit:    1. Essential hypertension    2. Mixed hyperlipidemia    3. Seizure disorder ([facility]    4. History of traumatic brain injury    5. Bipolar disorder in full remission, most recent episode unspecified type ([facility]    6. Memory impairment    7. Intellectual disability    8. Medicare annual wellness visit, subsequent      Follow-up and Dispositions    · Return in about 4 months (around 1/29/2021).      lab results and schedule of future lab studies reviewed with patient  reviewed diet, exercise and weight control  reviewed medications and side effects in detail  F/u with other MD's as scheduled  Fall precautions discussed  Advised patient to lose weight by watching diet (decreasing sugars/carbs/fat, increasing fruits/vegetables), exercising at least 30 minutes daily, getting 7-8 hours of sleep daily, drinking plenty of water, and decreasing stress  COVID-19 precautions discussed with pt  Overall seems stable

## 2021-02-05 ENCOUNTER — VIRTUAL VISIT (OUTPATIENT)
Dept: INTERNAL MEDICINE CLINIC | Age: 61
End: 2021-02-05
Payer: MEDICARE

## 2021-02-05 DIAGNOSIS — F79 INTELLECTUAL DISABILITY: ICD-10-CM

## 2021-02-05 DIAGNOSIS — Z87.820 HISTORY OF TRAUMATIC BRAIN INJURY: ICD-10-CM

## 2021-02-05 DIAGNOSIS — K21.9 GASTROESOPHAGEAL REFLUX DISEASE WITHOUT ESOPHAGITIS: ICD-10-CM

## 2021-02-05 DIAGNOSIS — I10 ESSENTIAL HYPERTENSION: Primary | ICD-10-CM

## 2021-02-05 DIAGNOSIS — G40.909 SEIZURE DISORDER (HCC): ICD-10-CM

## 2021-02-05 DIAGNOSIS — F31.70 BIPOLAR DISORDER IN FULL REMISSION, MOST RECENT EPISODE UNSPECIFIED TYPE (HCC): ICD-10-CM

## 2021-02-05 PROCEDURE — G8428 CUR MEDS NOT DOCUMENT: HCPCS | Performed by: INTERNAL MEDICINE

## 2021-02-05 PROCEDURE — G9717 DOC PT DX DEP/BP F/U NT REQ: HCPCS | Performed by: INTERNAL MEDICINE

## 2021-02-05 PROCEDURE — G0463 HOSPITAL OUTPT CLINIC VISIT: HCPCS | Performed by: INTERNAL MEDICINE

## 2021-02-05 PROCEDURE — G8419 CALC BMI OUT NRM PARAM NOF/U: HCPCS | Performed by: INTERNAL MEDICINE

## 2021-02-05 PROCEDURE — G8756 NO BP MEASURE DOC: HCPCS | Performed by: INTERNAL MEDICINE

## 2021-02-05 PROCEDURE — 3017F COLORECTAL CA SCREEN DOC REV: CPT | Performed by: INTERNAL MEDICINE

## 2021-02-05 PROCEDURE — 99214 OFFICE O/P EST MOD 30 MIN: CPT | Performed by: INTERNAL MEDICINE

## 2021-02-05 NOTE — PROGRESS NOTES
Health Maintenance Due   Topic Date Due    Shingrix Vaccine Age 49> (1 of 2) 10/22/2008       Chief Complaint   Patient presents with    Hypertension    Other     follow up        1. Have you been to the ER, urgent care clinic since your last visit? Hospitalized since your last visit? No    2. Have you seen or consulted any other health care providers outside of the [address] [place] [address] since your last visit? Include any pap smears or colon screening. No    3) Do you have an Advance Directive on file? no    4) Are you interested in receiving information on Advance Directives? NO      Patient is accompanied by self I have received verbal consent from Charbel Perez to discuss any/all medical information while they are present in the room.

## 2021-02-05 NOTE — PROGRESS NOTES
Room:     Identified pt with two pt identifiers(name and ). Reviewed record in preparation for visit and have obtained necessary documentation. All patient medications has been reviewed. No chief complaint on file. Health Maintenance Due   Topic    Shingrix Vaccine Age 50> (1 of 2)       There were no vitals filed for this visit. 4.Have you been to the ER, urgent care clinic since your last visit? Hospitalized since your last visit? No    5. Have you seen or consulted any other health care providers outside of the [address] [place] [address] since your last visit? Include any pap smears or colon screening. No    Patient is accompanied by adult caretaker I have received verbal consent from José Miguel Welsh to discuss any/all medical information while they are present in the room.

## 2021-02-05 NOTE — PROGRESS NOTES
Willard Bourgeois is a 56 y.o. male who was seen by synchronous (real-time) audio-video technology on 2/5/2021 for Hypertension and Other (follow up )        Assessment & Plan:   Diagnoses and all orders for this visit:    1. Essential hypertension    2. Gastroesophageal reflux disease without esophagitis    3. History of traumatic brain injury    4. Seizure disorder ([address]    5. Intellectual disability    6. Bipolar disorder in full remission, most recent episode unspecified type ([address]        I spent at least 25 minutes on this visit with this established patient. Subjective:     Pt. is seen virtually with his  from his group home for f/u. Has a few chronic medical issues as documented including HTN, GERD, seizures, history of TBI, cognitive deficit and bipolar disorder. BP is stable on medications. Followed by neurology and psychiatry. No recent seizures. No recent ER or hospital visits. Overall feeling well. Not happy about staying home most of the time because of COVID-19. Depends a staff for some ADLs. Denies any signs or symptoms of COVID-19. PMH/PSH/Allergies/Social History/medication list and most recent studies reviewed with patient. Lipids in 7/2020 were stable. Tobacco use: No  Alcohol use: No    Reports compliance with medications and diet. Trying to be active physically to control weight. Reports no other new c/o. Plan:  Continue current medications  F/u with other MD's as scheduled  Watch diet and remain active physically  COVID-19 precautions discussed with pt  Advised patient to get Covid vaccination  Follow-up 4 months or as needed  Prior to Admission medications    Medication Sig Start Date End Date Taking? Authorizing Provider   hydrALAZINE (APRESOLINE) 100 mg tablet TAKE ONE (1) TABLET BY MOUTH THREE TIMES A DAY.  12/29/20  Yes Charis Bennett NP   lisinopriL (PRINIVIL, ZESTRIL) 40 mg tablet TAKE 1 TABLET BY MOUTH DAILY 11/27/20  Yes Charis Bennett NP indapamide (LOZOL) 1.25 mg tablet TAKE 1 TABLET BY MOUTH DAILY 11/4/20  Yes Yolanda Tong NP   metoprolol succinate (TOPROL-XL) 50 mg XL tablet TAKE ONE TABLET BY MOUTH AT BEDTIME 6/30/20  Yes Ray Barboza MD   atorvastatin (LIPITOR) 10 mg tablet TAKE ONE TABLET DAILY (FOR CHOLESTEROL) 6/30/20  Yes Lincoln Stout DO   PHENobarbitaL (LUMINAL) 60 mg tablet Take 2 Tabs by mouth nightly. Max Daily Amount: 120 mg. 6/9/20  Yes aCrroll Siegel DO   amLODIPine (NORVASC) 10 mg tablet TAKE 1 TABLET BY MOUTH DAILY 5/1/20  Yes Zuleyma Lerner NP   cetirizine (ZYRTEC) 10 mg tablet TAKE 1 TABLET BY MOUTH DAILY 3/30/20  Yes Zuleyma Lerner NP   One Daily Multivitamin tablet TAKE 1 TABLET BY MOUTH DAILY 3/30/20  Yes Lincoln Stout DO   cloNIDine HCL (CATAPRES) 0.3 mg tablet TAKE 1 TABLET BY MOUTH TWICE A DAY 3/4/20  Yes Zuleyma Lerner NP   potassium chloride SR (KLOR-CON 10) 10 mEq tablet TAKE 1 TABLET BY MOUTH DAILY 3/2/20  Yes Lincoln Stout DO   fluticasone propionate (FLONASE) 50 mcg/actuation nasal spray 2 Sprays by Both Nostrils route daily. 2/18/20  Yes Lincoln Stout DO   azelastine (OPTIVAR) 0.05 % ophthalmic solution Administer 1 Drop to both eyes every evening. Use in affected eye(s)   Yes Provider, Historical   cycloSPORINE (RESTASIS) 0.05 % dpet Administer 1 Drop to both eyes every twelve (12) hours. Yes Provider, Historical   memantine (NAMENDA) 10 mg tablet Take 10 mg by mouth two (2) times a day. Yes Provider, Historical   OLANZapine (ZYPREXA) 7.5 mg tablet Take 7.5 mg by mouth nightly. Yes Provider, Historical   calcium phosphate-vitamin D3 (CITRACAL + D3, CALCIUM PHOS,) 250 mg calcium- 500 unit chew Take 1 Tab by mouth daily. 10/24/18  Yes Antonio Mcdonnell NP   vit A/vit C/vit E/zinc/copper (PRESERVISION AREDS PO) Take  by mouth.    Yes Provider, Historical   omeprazole (PRILOSEC) 20 mg capsule TAKE (1) CAPSULE BY MOUTH DAILY 5/31/18  Yes Antonio Mcdonnell, NP latanoprost (XALATAN) 0.005 % ophthalmic solution  9/25/15  Yes Provider, Historical   alprazolam (XANAX) 0.25 mg tablet Take 0.125 mg by mouth two (2) times a day. Yes Shiv, MD [place]   busPIRone (BUSPAR) 15 mg tablet Take 15 mg by mouth two (2) times a day. Yes Other, MD [place]     Patient Active Problem List    Diagnosis Date Noted    Seizure disorder ([address] 07/25/2013     Priority: 3 - Three    Sebaceous cyst 04/27/2020    Status post left hip replacement 10/22/2019    Abnormal brain MRI 09/17/2019    History of traumatic brain injury 04/05/2019    Gastroesophageal reflux disease without esophagitis 10/24/2018    Esophageal dysphagia 04/20/2018    Hyperglycemia 07/07/2017    Memory impairment 06/20/2017    [facility] 06/20/2017    DOROTA (generalized anxiety disorder) 12/06/2016    Prediabetes 12/06/2016    Obesity (BMI 30.0-34.9) 12/06/2016    Mixed hyperlipidemia 12/06/2016    ACP (advance care planning) 03/07/2016    Intellectual disability     Se[facility]ures ([facility]    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     Hypertension     Hip arthritis 07/23/2013     Current Outpatient Medications   Medication Sig Dispense Refill    hydrALAZINE (APRESOLINE) 100 mg tablet TAKE ONE (1) TABLET BY MOUTH THREE TIMES A DAY. 93 Tab 3    lisinopriL (PRINIVIL, ZESTRIL) 40 mg tablet TAKE 1 TABLET BY MOUTH DAILY 30 Tab 5    indapamide (LOZOL) 1.25 mg tablet TAKE 1 TABLET BY MOUTH DAILY 31 Tab 5    metoprolol succinate (TOPROL-XL) 50 mg XL tablet TAKE ONE TABLET BY MOUTH AT BEDTIME 30 Tab 11    atorvastatin (LIPITOR) 10 mg tablet TAKE ONE TABLET DAILY (FOR CHOLESTEROL) 30 Tab 11    PHENobarbitaL (LUMINAL) 60 mg tablet Take 2 Tabs by mouth nightly.  Max Daily Amount: 120 mg. 180 Tab 3    amLODIPine (NORVASC) 10 mg tablet TAKE 1 TABLET BY MOUTH DAILY 30 Tab 11    cetirizine (ZYRTEC) 10 mg tablet TAKE 1 TABLET BY MOUTH DAILY 31 Tab PRN    One Daily Multivitamin tablet TAKE 1 TABLET BY MOUTH DAILY 31 Tab PRN    cloNIDine HCL (CATAPRES) 0.3 mg tablet TAKE 1 TABLET BY MOUTH TWICE A DAY 58 Tab PRN    potassium chloride SR (KLOR-CON 10) 10 mEq tablet TAKE 1 TABLET BY MOUTH DAILY 29 Tab PRN    fluticasone propionate (FLONASE) 50 mcg/actuation nasal spray 2 Sprays by Both Nostrils route daily. 1 Bottle 11    azelastine (OPTIVAR) 0.05 % ophthalmic solution Administer 1 Drop to both eyes every evening. Use in affected eye(s)      cycloSPORINE (RESTASIS) 0.05 % dpet Administer 1 Drop to both eyes every twelve (12) hours.  memantine (NAMENDA) 10 mg tablet Take 10 mg by mouth two (2) times a day.  OLANZapine (ZYPREXA) 7.5 mg tablet Take 7.5 mg by mouth nightly.  calcium phosphate-vitamin D3 (CITRACAL + D3, CALCIUM PHOS,) 250 mg calcium- 500 unit chew Take 1 Tab by mouth daily. 90 Tab 1    vit A/vit C/vit E/zinc/copper (PRESERVISION AREDS PO) Take  by mouth.  omeprazole (PRILOSEC) 20 mg capsule TAKE (1) CAPSULE BY MOUTH DAILY 31 Cap 10    latanoprost (XALATAN) 0.005 % ophthalmic solution       alprazolam (XANAX) 0.25 mg tablet Take 0.125 mg by mouth two (2) times a day.  busPIRone (BUSPAR) 15 mg tablet Take 15 mg by mouth two (2) times a day.        No Known Allergies  Past Medical History:   Diagnosis Date    Anxiety     Arthritis     [facility] ([facility]     yuri kelly md psychiatry    Constipation     Glaucoma     Hypercholesterolemia     Hypertension     Intellectual disability     [facility]    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     rp  abhay walker -[phone]    S/P colonoscopy 4-6-12    rt n    S/P total hip arthroplasty     left    Sebaceous cyst 4/27/2020    Seizures ([facility]      Past Surgical History:   Procedure Laterality Date    COLONOSCOPY N/A 12/10/2019    COLONOSCOPY performed by Cortes Childs MD at [address] [place] [address] ENDOSCOPY     Family History   Problem Relation Age of Onset    No Known Problems Mother     No Known Problems Father      Social History     Tobacco Use    Smoking status: Never Smoker    Smokeless tobacco: Never Used   Substance Use Topics    Alcohol use: No     Alcohol/week: 0.0 standard drinks       ROS    Objective:     Patient-Reported Vitals 2/5/2021   Patient-Reported Temperature 97.1        [INSTRUCTIONS:  \"[x]\" Indicates a positive item  \"[]\" Indicates a negative item  -- DELETE ALL ITEMS NOT EXAMINED]    Constitutional: [x] Appears well-developed and well-nourished [x] No apparent distress      [] Abnormal -     Mental status: [x] Alert and awake  [x] Oriented to person/place/time [x] Able to follow commands    [] Abnormal -     Eyes:   EOM    [x]  Normal    [] Abnormal -   Sclera  [x]  Normal    [] Abnormal -          Discharge [x]  None visible   [] Abnormal -     HENT: [x] Normocephalic, atraumatic  [] Abnormal -   [x] Mouth/Throat: Mucous membranes are moist    External Ears [x] Normal  [] Abnormal -    Neck: [x] No visualized mass [] Abnormal -     Pulmonary/Chest: [x] Respiratory effort normal   [x] No visualized signs of difficulty breathing or respiratory distress        [] Abnormal -      Musculoskeletal:   [x] Normal gait with no signs of ataxia         [x] Normal range of motion of neck        [] Abnormal -     Neurological:        [x] No Facial Asymmetry (Cranial nerve 7 motor function) (limited exam due to video visit)          [x] No gaze palsy        [] Abnormal -          Skin:        [x] No significant exanthematous lesions or discoloration noted on facial skin         [] Abnormal -            Psychiatric:       [x] Normal Affect [] Abnormal -        [x] No Hallucinations    Other pertinent observable physical exam findings:-        We discussed the expected course, resolution and complications of the diagnosis(es) in detail. Medication risks, benefits, costs, interactions, and alternatives were discussed as indicated.   I advised him to contact the office if his condition worsens, changes or fails to improve as anticipated. He expressed understanding with the diagnosis(es) and plan. Akanksha Marino, who was evaluated through a patient-initiated, synchronous (real-time) audio-video encounter, and/or his healthcare decision maker, is aware that it is a billable service, with coverage as determined by his insurance carrier. He provided verbal consent to proceed: Yes, and patient identification was verified. It was conducted pursuant to the emergency declaration under the [address] [place] [address], [address] [place] [address] authority and the [employer] Act. A caregiver was present when appropriate. Ability to conduct physical exam was limited. I was at home. The patient was at home.       Bekah Lab, DO

## 2021-03-31 DIAGNOSIS — J30.2 SEASONAL ALLERGIC RHINITIS, UNSPECIFIED TRIGGER: ICD-10-CM

## 2021-03-31 RX ORDER — CETIRIZINE HCL 10 MG
TABLET ORAL
Qty: 31 TAB | Refills: 12 | Status: SHIPPED | OUTPATIENT
Start: 2021-03-31 | End: 2022-03-29

## 2021-03-31 RX ORDER — OMEPRAZOLE 20 MG/1
CAPSULE, DELAYED RELEASE ORAL
Qty: 31 CAP | Refills: 12 | Status: SHIPPED | OUTPATIENT
Start: 2021-03-31 | End: 2022-03-29

## 2021-05-28 RX ORDER — METOPROLOL SUCCINATE 50 MG/1
TABLET, EXTENDED RELEASE ORAL
Qty: 31 TABLET | Refills: 12 | Status: SHIPPED | OUTPATIENT
Start: 2021-05-28 | End: 2022-06-03

## 2021-06-11 ENCOUNTER — OFFICE VISIT (OUTPATIENT)
Dept: INTERNAL MEDICINE CLINIC | Age: 61
End: 2021-06-11
Payer: MEDICARE

## 2021-06-11 VITALS
HEART RATE: 64 BPM | SYSTOLIC BLOOD PRESSURE: 132 MMHG | BODY MASS INDEX: 28.63 KG/M2 | RESPIRATION RATE: 18 BRPM | WEIGHT: 200 LBS | DIASTOLIC BLOOD PRESSURE: 74 MMHG | OXYGEN SATURATION: 95 % | TEMPERATURE: 97.7 F | HEIGHT: 70 IN

## 2021-06-11 DIAGNOSIS — K21.9 GASTROESOPHAGEAL REFLUX DISEASE WITHOUT ESOPHAGITIS: ICD-10-CM

## 2021-06-11 DIAGNOSIS — Z96.642 STATUS POST LEFT HIP REPLACEMENT: ICD-10-CM

## 2021-06-11 DIAGNOSIS — F79 INTELLECTUAL DISABILITY: ICD-10-CM

## 2021-06-11 DIAGNOSIS — R41.3 MEMORY IMPAIRMENT: ICD-10-CM

## 2021-06-11 DIAGNOSIS — Z87.820 HISTORY OF TRAUMATIC BRAIN INJURY: ICD-10-CM

## 2021-06-11 DIAGNOSIS — I10 ESSENTIAL HYPERTENSION: Primary | ICD-10-CM

## 2021-06-11 DIAGNOSIS — G40.909 SEIZURE DISORDER (HCC): ICD-10-CM

## 2021-06-11 DIAGNOSIS — E78.2 MIXED HYPERLIPIDEMIA: ICD-10-CM

## 2021-06-11 PROCEDURE — G8754 DIAS BP LESS 90: HCPCS | Performed by: INTERNAL MEDICINE

## 2021-06-11 PROCEDURE — G0463 HOSPITAL OUTPT CLINIC VISIT: HCPCS | Performed by: INTERNAL MEDICINE

## 2021-06-11 PROCEDURE — G8752 SYS BP LESS 140: HCPCS | Performed by: INTERNAL MEDICINE

## 2021-06-11 PROCEDURE — G9717 DOC PT DX DEP/BP F/U NT REQ: HCPCS | Performed by: INTERNAL MEDICINE

## 2021-06-11 PROCEDURE — 3017F COLORECTAL CA SCREEN DOC REV: CPT | Performed by: INTERNAL MEDICINE

## 2021-06-11 PROCEDURE — 99214 OFFICE O/P EST MOD 30 MIN: CPT | Performed by: INTERNAL MEDICINE

## 2021-06-11 PROCEDURE — G8419 CALC BMI OUT NRM PARAM NOF/U: HCPCS | Performed by: INTERNAL MEDICINE

## 2021-06-11 PROCEDURE — G8427 DOCREV CUR MEDS BY ELIG CLIN: HCPCS | Performed by: INTERNAL MEDICINE

## 2021-06-11 NOTE — PROGRESS NOTES
Health Maintenance Due   Topic Date Due    COVID-19 Vaccine (1) Never done    Shingrix Vaccine Age 50> (1 of 2) Never done       Chief Complaint   Patient presents with    Hypertension    GERD    Other     f/u        1. Have you been to the ER, urgent care clinic since your last visit? Hospitalized since your last visit? No    2. Have you seen or consulted any other health care providers outside of the [address] [place] [address] since your last visit? Include any pap smears or colon screening. No    3) Do you have an Advance Directive on file? no    4) Are you interested in receiving information on Advance Directives? NO      Patient is accompanied by self I have received verbal consent from Ann Mcknight to discuss any/all medical information while they are present in the room.

## 2021-06-15 LAB
ALBUMIN SERPL-MCNC: 4.3 G/DL (ref 3.8–4.8)
ALBUMIN/GLOB SERPL: 1.8 {RATIO} (ref 1.2–2.2)
ALP SERPL-CCNC: 81 IU/L (ref 48–121)
ALT SERPL-CCNC: 24 IU/L (ref 0–44)
AST SERPL-CCNC: 22 IU/L (ref 0–40)
BILIRUB SERPL-MCNC: <0.2 MG/DL (ref 0–1.2)
BUN SERPL-MCNC: 12 MG/DL (ref 8–27)
BUN/CREAT SERPL: 11 (ref 10–24)
CALCIUM SERPL-MCNC: 9 MG/DL (ref 8.6–10.2)
CHLORIDE SERPL-SCNC: 103 MMOL/L (ref 96–106)
CHOLEST SERPL-MCNC: 175 MG/DL (ref 100–199)
CO2 SERPL-SCNC: 24 MMOL/L (ref 20–29)
CREAT SERPL-MCNC: 1.09 MG/DL (ref 0.76–1.27)
ERYTHROCYTE [DISTWIDTH] IN BLOOD BY AUTOMATED COUNT: 12.4 % (ref 11.6–15.4)
GLOBULIN SER CALC-MCNC: 2.4 G/DL (ref 1.5–4.5)
GLUCOSE SERPL-MCNC: 104 MG/DL (ref 65–99)
HCT VFR BLD AUTO: 37.2 % (ref 37.5–51)
HDLC SERPL-MCNC: 41 MG/DL
HGB BLD-MCNC: 13 G/DL (ref 13–17.7)
IMP & REVIEW OF LAB RESULTS: NORMAL
LDLC SERPL CALC-MCNC: 108 MG/DL (ref 0–99)
MCH RBC QN AUTO: 32.3 PG (ref 26.6–33)
MCHC RBC AUTO-ENTMCNC: 34.9 G/DL (ref 31.5–35.7)
MCV RBC AUTO: 92 FL (ref 79–97)
PLATELET # BLD AUTO: 226 X10E3/UL (ref 150–450)
POTASSIUM SERPL-SCNC: 3.8 MMOL/L (ref 3.5–5.2)
PROT SERPL-MCNC: 6.7 G/DL (ref 6–8.5)
RBC # BLD AUTO: 4.03 X10E6/UL (ref 4.14–5.8)
SODIUM SERPL-SCNC: 141 MMOL/L (ref 134–144)
TRIGL SERPL-MCNC: 144 MG/DL (ref 0–149)
VLDLC SERPL CALC-MCNC: 26 MG/DL (ref 5–40)
WBC # BLD AUTO: 6.6 X10E3/UL (ref 3.4–10.8)

## 2021-06-22 ENCOUNTER — HOSPITAL ENCOUNTER (OUTPATIENT)
Dept: LAB | Age: 61
Discharge: HOME | End: 2021-06-22
Payer: MEDICARE

## 2021-06-22 ENCOUNTER — OFFICE VISIT (OUTPATIENT)
Dept: NEUROLOGY | Age: 61
End: 2021-06-22
Payer: MEDICARE

## 2021-06-22 VITALS
BODY MASS INDEX: 28.63 KG/M2 | OXYGEN SATURATION: 97 % | DIASTOLIC BLOOD PRESSURE: 60 MMHG | RESPIRATION RATE: 16 BRPM | SYSTOLIC BLOOD PRESSURE: 102 MMHG | WEIGHT: 200 LBS | HEIGHT: 70 IN | HEART RATE: 59 BPM

## 2021-06-22 DIAGNOSIS — R41.3 MEMORY LOSS DUE TO MEDICAL CONDITION: ICD-10-CM

## 2021-06-22 DIAGNOSIS — G40.109 TEMPORAL LOBE EPILEPSY WITH MESIAL TEMPORAL SCLEROSIS (HCC): Primary | ICD-10-CM

## 2021-06-22 DIAGNOSIS — G40.909 SEIZURE DISORDER (HCC): ICD-10-CM

## 2021-06-22 DIAGNOSIS — G93.81 TEMPORAL LOBE EPILEPSY WITH MESIAL TEMPORAL SCLEROSIS (HCC): Primary | ICD-10-CM

## 2021-06-22 PROCEDURE — G8752 SYS BP LESS 140: HCPCS | Performed by: PSYCHIATRY & NEUROLOGY

## 2021-06-22 PROCEDURE — 36415 COLL VENOUS BLD VENIPUNCTURE: CPT

## 2021-06-22 PROCEDURE — 99214 OFFICE O/P EST MOD 30 MIN: CPT | Performed by: PSYCHIATRY & NEUROLOGY

## 2021-06-22 PROCEDURE — G9717 DOC PT DX DEP/BP F/U NT REQ: HCPCS | Performed by: PSYCHIATRY & NEUROLOGY

## 2021-06-22 PROCEDURE — G8427 DOCREV CUR MEDS BY ELIG CLIN: HCPCS | Performed by: PSYCHIATRY & NEUROLOGY

## 2021-06-22 PROCEDURE — 3017F COLORECTAL CA SCREEN DOC REV: CPT | Performed by: PSYCHIATRY & NEUROLOGY

## 2021-06-22 PROCEDURE — G8419 CALC BMI OUT NRM PARAM NOF/U: HCPCS | Performed by: PSYCHIATRY & NEUROLOGY

## 2021-06-22 PROCEDURE — 80184 ASSAY OF PHENOBARBITAL: CPT

## 2021-06-22 PROCEDURE — G8754 DIAS BP LESS 90: HCPCS | Performed by: PSYCHIATRY & NEUROLOGY

## 2021-06-22 RX ORDER — PHENOBARBITAL 60 MG/1
TABLET ORAL
Qty: 62 TABLET | Refills: 5 | Status: SHIPPED | OUTPATIENT
Start: 2021-06-22 | End: 2021-12-28

## 2021-06-22 NOTE — PROGRESS NOTES
Chief Complaint   Patient presents with    Seizure       HPI    49-year-old gentleman following up. He is here with his  from his group home. I saw him last, he was unable to work due to the pandemic. Unfortunately he still remains homebound. Caretaker reports no seizures in the last year. However he is wandering more and becoming more restless because he is not as occupied with his time. Medications are administered to him nightly. He needs to be reminded to wear different clothing sometimes. He can do his own dressing and feeding and toileting. He has some friends where he lives. He doesn't drive. Background:  Mr. Lauryn Gudino is a 25-year-old gentleman who lives in group home,independently, here for memory loss. He is here with his , Steph Kohli. She has known him since 2008. He has history of psychiatric disease manifesting as bipolar disorder, history of epilepsy, history of brain injury and intellectual disability. The nature of his brain injury per  is something from birth but we do not know the details. 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 Overall his condition is stable. His last known seizure is unknown. His  tells me he is essentially his baseline. He can do his own ADLs such as hygiene, eating, dressing. 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 He does not drive. His medications are administered to him. He does have supervision where he lives due to occasional wandering but no behavioral issues. He is followed by Dr. Rebecca Weiss in psychiatry. Namenda had been started last year for memory loss. He is on phenobarbital at bedtime.       Review of Systems   Unable to perform ROS: Mental acuity       Past Medical History:   Diagnosis Date    Anxiety     Arthritis     [facility] ([facility]     yuri kelly md psychiatry    Constipation     Glaucoma     Hypercholesterolemia     Hypertension     Intellectual disability      - [facility]    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     rp  abhay wright -[phone]    S/P colonoscopy 4-6-12    rt n    S/P total hip arthroplasty     left    Sebaceous cyst 4/27/2020    Seizures ([facility]      Family History   Problem Relation Age of Onset    No Known Problems Mother     No Known Problems Father      Social History     Socioeconomic History    Marital status: SINGLE     Spouse name: Not on file    Number of children: Not on file    Years of education: Not on file    Highest education level: Not on file   Occupational History    Not on file   Tobacco Use    Smoking status: Never Smoker    Smokeless tobacco: Never Used   Substance and Sexual Activity    Alcohol use: No     Alcohol/week: 0.0 standard drinks    Drug use: No    Sexual activity: Never   Other Topics Concern    Not on file   Social History Narrative    Not on file     Social Determinants of Health     Financial Resource Strain:     Difficulty of Paying Living Expenses:    Food Insecurity:     Worried About Running Out of Food in the Last Year:     Ran Out of Food in the Last Year:    Transportation Needs:     Lack of Transportation (Medical):      Lack of Transportation (Non-Medical):    Physical Activity:     Days of Exercise per Week:     Minutes of Exercise per Session:    Stress:     Feeling of Stress :    Social Connections:     Frequency of Communication with Friends and Family:     Frequency of Social Gatherings with Friends and Family:     Attends Christian Services:     Active Member of Clubs or Organizations:     Attends Club or Organization Meetings:     Marital Status:    Intimate Partner Violence:     Fear of Current or Ex-Partner:     Emotionally Abused:     Physically Abused:     Sexually Abused:      No Known Allergies      Current Outpatient Medications   Medication Sig  OTHER Amonium Lactate 12% lotion twice daily.  PHENobarbitaL (LUMINAL) 60 mg tablet TAKE 2 TABLETS BY MOUTH AT BEDTIME.  metoprolol succinate (TOPROL-XL) 50 mg XL tablet TAKE ONE TABLET BY MOUTH AT BEDTIME    lisinopriL (PRINIVIL, ZESTRIL) 40 mg tablet TAKE 1 TABLET BY MOUTH DAILY    atorvastatin (LIPITOR) 10 mg tablet TAKE ONE TABLET DAILY (FOR CHOLESTEROL)    indapamide (LOZOL) 1.25 mg tablet TAKE 1 TABLET BY MOUTH DAILY    hydrALAZINE (APRESOLINE) 100 mg tablet TAKE ONE (1) TABLET BY MOUTH THREE TIMES A DAY.  amLODIPine (NORVASC) 10 mg tablet TAKE 1 TABLET BY MOUTH DAILY    cetirizine (ZYRTEC) 10 mg tablet TAKE 1 TABLET BY MOUTH DAILY    omeprazole (PRILOSEC) 20 mg capsule TAKE 1 CAPSULE BY MOUTH DAILY    cloNIDine HCL (CATAPRES) 0.3 mg tablet TAKE 1 TABLET BY MOUTH TWICE A DAY    potassium chloride SR (KLOR-CON 10) 10 mEq tablet TAKE 1 TABLET BY MOUTH DAILY    azelastine (OPTIVAR) 0.05 % ophthalmic solution Administer 1 Drop to both eyes every evening. Use in affected eye(s)    cycloSPORINE (RESTASIS) 0.05 % dpet Administer 1 Drop to both eyes every twelve (12) hours.  memantine (NAMENDA) 10 mg tablet Take 10 mg by mouth two (2) times a day.  OLANZapine (ZYPREXA) 7.5 mg tablet Take 7.5 mg by mouth nightly.  vit A/vit C/vit E/zinc/copper (PRESERVISION AREDS PO) Take  by mouth.  latanoprost (XALATAN) 0.005 % ophthalmic solution     alprazolam (XANAX) 0.25 mg tablet Take 0.125 mg by mouth two (2) times a day.  One Daily Multivitamin tablet TAKE 1 TABLET BY MOUTH DAILY (Patient not taking: Reported on 6/22/2021)    fluticasone propionate (FLONASE) 50 mcg/actuation nasal spray 2 Sprays by Both Nostrils route daily. (Patient not taking: Reported on 6/22/2021)    calcium phosphate-vitamin D3 (CITRACAL + D3, CALCIUM PHOS,) 250 mg calcium- 500 unit chew Take 1 Tab by mouth daily.  (Patient not taking: Reported on 6/22/2021)    busPIRone (BUSPAR) 15 mg tablet Take 15 mg by mouth two (2) times a day. No current facility-administered medications for this visit. Neurologic Exam     Mental Status   WD/WN adult in NAD, normal grooming  VSS  A&O smiling pleasant follows commands    PERRL, nonicteric  Face is masked  Speech is slurredbaseline  No limb ataxia. No abnl movements. Moving all extemities spontaneously and symmetric  Slow steady gait    CVS RRR  Lungs nonlabored  Skin is warm and dry         Visit Vitals  /60   Pulse (!) 59   Resp 16   Ht 5' 10\" (1.778 m)   Wt 200 lb (90.7 kg)   SpO2 97%   BMI 28.70 kg/m²       Assessment and Plan   Diagnoses and all orders for this visit:    1. 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 Memory loss due to medical condition    3. Seizure disorder (HCC)  -     PHENobarbitaL (LUMINAL) 60 mg tablet; TAKE 2 TABLETS BY MOUTH AT BEDTIME. 69-year-old gentleman with epilepsy which is stable. However it does sound like he is having some subtle behavioral changes manifesting as wandering and restlessness which could be due to the fact he has no occupation to keep him busy. I need him to be busier during the day with jigsaw puzzles or coloring. Hopefully he can try to find some new job that can keep him busy and keep him mentally stimulated. I do think there is high risk he is going to have worsening memory loss down the road. Continue phenobarbital as is, check a new level today. Recent LFTs okay. 30 minutes of time was spent reviewing the medical record today to include the recent blood work from primary care, speaking with the caregiver for collateral information, face-to-face time with the patient, completion of documentation. I reviewed and decided to continue the current medications. This clinical note was dictated with an electronic dictation software that can make unintentional errors.   If there are any questions, please contact me directly for clarification.       [address] [place] [address], [facility] [address]  Diplomate ABPN

## 2021-06-22 NOTE — PROGRESS NOTES
Mr. Usman Johns presents today to follow up seizures. He has not had any recent seizure activity but staff of the group home reported patient has had some recent confusion.

## 2021-06-22 NOTE — PATIENT INSTRUCTIONS
PRESCRIPTION REFILL POLICY [facility] Statement to Patients April 1, 2014 In an effort to ensure the large volume of patient prescription refills is processed in the most efficient and expeditious manner, we are asking our patients to assist us by calling your Pharmacy for all prescription refills, this will include also your  Mail Order Pharmacy. The pharmacy will contact our office electronically to continue the refill process. Please do not wait until the last minute to call your pharmacy. We need at least 48 hours (2days) to fill prescriptions. We also encourage you to call your pharmacy before going to  your prescription to make sure it is ready. With regard to controlled substance prescription refill requests (narcotic refills) that need to be picked up at our office, we ask your cooperation by providing us with at least 72 hours (3days) notice that you will need a refill. We will not refill narcotic prescription refill requests after 4:00pm on any weekday, Monday through Thursday, or after 2:00pm on Fridays, or on the weekends. We encourage everyone to explore another way of getting your prescription refill request processed using [employer], our patient web portal through our electronic medical record system. [employer] is an efficient and effective way to communicate your medication request directly to the office and  downloadable as an isabel on your smart phone . [employer] also features a review functionality that allows you to view your medication list as well as leave messages for your physician. Are you ready to get connected? If so please review the attatched instructions or speak to any of our staff to get you set up right away! Thank you so much for your cooperation. Should you have any questions please contact our Practice Administrator. The Physicians and Staff,  [facility]

## 2021-06-23 LAB — PHENOBARB SERPL-MCNC: 21 UG/ML (ref 15–40)

## 2021-06-23 NOTE — PROGRESS NOTES
HISTORY OF PRESENT ILLNESS  Yonathan Harvey is a 56 y.o. male. Pt. comes in with his  from his group home for f/u. Has a few chronic medical issues as documented. Vital signs including BP are stable. History of TBI with intellectual disability and some memory impairment. Followed by neurology and psychiatry. No recent seizures. No recent ER or hospital is. Depends a staff for some ADLs. No signs or symptoms of COVID-19. Reports feeling well overall. Denies any acute issues today. PMH/PSH/Allergies/Social History/medication list and most recent studies reviewed with patient. Tobacco use: No  Alcohol use: Social  Reports compliance with medications and diet. Trying to be active physically as tolerated. Reports no other new c/o. HPI    Review of Systems   Constitutional: Negative. HENT: Negative. Eyes: Negative. Respiratory: Negative for shortness of breath. Cardiovascular: Negative for chest pain and leg swelling. Gastrointestinal: Positive for heartburn. Negative for abdominal pain and nausea. Genitourinary: Negative for dysuria. Musculoskeletal: Negative for falls and joint pain. Skin: Negative. Neurological: Positive for seizures. Negative for dizziness, sensory change and headaches. Endo/Heme/Allergies: Negative. Psychiatric/Behavioral: Positive for memory loss. Negative for depression. The patient is nervous/anxious. The patient does not have insomnia. All other systems reviewed and are negative. Physical Exam  Vitals and nursing note reviewed. Constitutional:       General: He is not in acute distress. Appearance: He is well-developed. Comments: Pleasant man overweight   HENT:      Head: Normocephalic and atraumatic. Mouth/Throat:      Mouth: Mucous membranes are moist.      Pharynx: Oropharynx is clear. Eyes:      General: No scleral icterus.      Conjunctiva/sclera: Conjunctivae normal.      Comments: Poor vision   Neck:      Thyroid: No thyromegaly. Vascular: No JVD. Cardiovascular:      Rate and Rhythm: Normal rate and regular rhythm. Heart sounds: Normal heart sounds. No murmur heard. Pulmonary:      Effort: Pulmonary effort is normal. No respiratory distress. Breath sounds: Normal breath sounds. No wheezing or rales. Abdominal:      General: Bowel sounds are normal. There is no distension. Palpations: Abdomen is soft. Tenderness: There is no abdominal tenderness. Comments: obese   Musculoskeletal:         General: No tenderness. Cervical back: Normal range of motion and neck supple. Skin:     General: Skin is warm and dry. Findings: No rash. Neurological:      Mental Status: He is alert. Coordination: Coordination normal.      Comments: A+O to name, Wes, not date   Doesn't know president's name   Psychiatric:         Behavior: Behavior normal.      Comments: Slow speech  Chronic cognitive deficit         ASSESSMENT and PLAN  Diagnoses and all orders for this visit:    1. Essential hypertension  -     LIPID PANEL; Future  -     METABOLIC PANEL, COMPREHENSIVE; Future  -     CBC W/O DIFF; Future    2. Gastroesophageal reflux disease without esophagitis    3. Seizure disorder ([facility] [address]    4. History of traumatic brain injury    5. Intellectual disability    6. Mixed hyperlipidemia  -     LIPID PANEL; Future  -     METABOLIC PANEL, COMPREHENSIVE; Future  -     CBC W/O DIFF; Future    7. Memory impairment    8. Status post left hip replacement    Other orders  -     METABOLIC PANEL, COMPREHENSIVE  -     CBC W/O DIFF  -     LIPID PANEL  -     CVD REPORT      Follow-up and Dispositions    · Return in about 6 months (around 12/11/2021).      lab results and schedule of future lab studies reviewed with patient  reviewed diet, exercise and weight control  reviewed medications and side effects in detail  Monitor BP at home with goal of 140/90 or less  F/u with other MD's as scheduled  Fall precautions discussed  COVID-19 precautions discussed with pt  Overall stable

## 2021-06-30 RX ORDER — CLONIDINE HYDROCHLORIDE 0.3 MG/1
TABLET ORAL
Qty: 60 TABLET | Refills: 11 | Status: SHIPPED | OUTPATIENT
Start: 2021-06-30 | End: 2022-06-28

## 2021-11-29 RX ORDER — INDAPAMIDE 1.25 MG/1
TABLET, FILM COATED ORAL
Qty: 30 TABLET | Refills: 0 | Status: SHIPPED | OUTPATIENT
Start: 2021-11-29 | End: 2021-12-28

## 2021-11-29 RX ORDER — LISINOPRIL 40 MG/1
TABLET ORAL
Qty: 30 TABLET | Refills: 0 | Status: SHIPPED | OUTPATIENT
Start: 2021-11-29 | End: 2021-12-28

## 2021-11-29 RX ORDER — HYDRALAZINE HYDROCHLORIDE 100 MG/1
TABLET, FILM COATED ORAL
Qty: 90 TABLET | Refills: 0 | Status: SHIPPED | OUTPATIENT
Start: 2021-11-29 | End: 2021-12-28

## 2021-11-29 RX ORDER — ATORVASTATIN CALCIUM 10 MG/1
TABLET, FILM COATED ORAL
Qty: 30 TABLET | Status: SHIPPED | OUTPATIENT
Start: 2021-11-29

## 2021-12-10 ENCOUNTER — OFFICE VISIT (OUTPATIENT)
Dept: INTERNAL MEDICINE CLINIC | Age: 61
End: 2021-12-10
Payer: MEDICARE

## 2021-12-10 VITALS
WEIGHT: 216 LBS | DIASTOLIC BLOOD PRESSURE: 82 MMHG | RESPIRATION RATE: 20 BRPM | TEMPERATURE: 98.4 F | SYSTOLIC BLOOD PRESSURE: 132 MMHG | HEIGHT: 70 IN | HEART RATE: 78 BPM | BODY MASS INDEX: 30.92 KG/M2 | OXYGEN SATURATION: 98 %

## 2021-12-10 DIAGNOSIS — R41.3 MEMORY IMPAIRMENT: ICD-10-CM

## 2021-12-10 DIAGNOSIS — Z00.00 MEDICARE ANNUAL WELLNESS VISIT, SUBSEQUENT: ICD-10-CM

## 2021-12-10 DIAGNOSIS — E78.2 MIXED HYPERLIPIDEMIA: ICD-10-CM

## 2021-12-10 DIAGNOSIS — Z87.820 HISTORY OF TRAUMATIC BRAIN INJURY: ICD-10-CM

## 2021-12-10 DIAGNOSIS — I10 ESSENTIAL HYPERTENSION: Primary | ICD-10-CM

## 2021-12-10 DIAGNOSIS — G40.909 SEIZURE DISORDER (HCC): ICD-10-CM

## 2021-12-10 DIAGNOSIS — F31.70 BIPOLAR DISORDER IN FULL REMISSION, MOST RECENT EPISODE UNSPECIFIED TYPE (HCC): ICD-10-CM

## 2021-12-10 DIAGNOSIS — R73.9 HYPERGLYCEMIA: ICD-10-CM

## 2021-12-10 LAB — HBA1C MFR BLD HPLC: 5.1 %

## 2021-12-10 PROCEDURE — G8417 CALC BMI ABV UP PARAM F/U: HCPCS | Performed by: INTERNAL MEDICINE

## 2021-12-10 PROCEDURE — 83036 HEMOGLOBIN GLYCOSYLATED A1C: CPT | Performed by: INTERNAL MEDICINE

## 2021-12-10 PROCEDURE — G0463 HOSPITAL OUTPT CLINIC VISIT: HCPCS | Performed by: INTERNAL MEDICINE

## 2021-12-10 PROCEDURE — 99214 OFFICE O/P EST MOD 30 MIN: CPT | Performed by: INTERNAL MEDICINE

## 2021-12-10 PROCEDURE — 3017F COLORECTAL CA SCREEN DOC REV: CPT | Performed by: INTERNAL MEDICINE

## 2021-12-10 PROCEDURE — G8754 DIAS BP LESS 90: HCPCS | Performed by: INTERNAL MEDICINE

## 2021-12-10 PROCEDURE — G8752 SYS BP LESS 140: HCPCS | Performed by: INTERNAL MEDICINE

## 2021-12-10 PROCEDURE — G9717 DOC PT DX DEP/BP F/U NT REQ: HCPCS | Performed by: INTERNAL MEDICINE

## 2021-12-10 PROCEDURE — G8427 DOCREV CUR MEDS BY ELIG CLIN: HCPCS | Performed by: INTERNAL MEDICINE

## 2021-12-10 PROCEDURE — G0439 PPPS, SUBSEQ VISIT: HCPCS | Performed by: INTERNAL MEDICINE

## 2021-12-10 NOTE — PROGRESS NOTES
Health Maintenance Due   Topic Date Due    Shingrix Vaccine Age 49> (1 of 2) Never done    A1C test (Diabetic or Prediabetic)  10/02/2019    Medicare Yearly Exam  09/30/2021       Chief Complaint   Patient presents with    Annual Wellness Visit    Diabetes    Hypertension       1. Have you been to the ER, urgent care clinic since your last visit? Hospitalized since your last visit? No    2. Have you seen or consulted any other health care providers outside of the [address] [place] [address] since your last visit? Include any pap smears or colon screening. No    3) Do you have an Advance Directive on file? no    4) Are you interested in receiving information on Advance Directives? NO      Patient is accompanied by adult caretaker I have received verbal consent from Cris Will to discuss any/all medical information while they are present in the room.

## 2021-12-10 NOTE — PROGRESS NOTES
Schedule of Personalized Health Plan  (Provide Copy to Patient)  The best way to stay healthy is to live a healthy lifestyle. A healthy lifestyle includes regular exercise, eating a well-balanced diet, keeping a healthy weight and not smoking. Regular physical exams and screening tests are another important way to take care of yourself. Preventive exams provided by health care providers can find health problems early when treatment works best and can keep you from getting certain diseases or illnesses. Preventive services include exams, lab tests, screenings, shots, monitoring and information to help you take care of your own health. All people over 65 should have a pneumonia shot. Pneumonia shots are usually only needed once in a lifetime unless your doctor decides differently. All people over 65 should have a yearly flu shot. People over 65 are at medium to high risk for Hepatitis B. Three shots are needed for complete protection. In addition to your physical exam, some screening tests are recommended:    Bone mass measurement (dexa scan) is recommended every two years if you have certain risk factors, such as personal history of vertebral fracture or chronic steroid medication use    Diabetes Mellitus screening is recommended every year. Glaucoma is an eye disease caused by high pressure in the eye. An eye exam is recommended every year. Cardiovascular screening tests that check your cholesterol and other blood fat (lipid) levels are recommended every five years. Colorectal Cancer screening tests help to find pre-cancerous polyps (growths in the colon) so they can be removed before they turn into cancer. Tests ordered for screening depend on your personal and family history risk factors.     Screening for Prostate Cancer is recommended yearly with a digital rectal exam and/or a PSA test    Here is a list of your current Health Maintenance items with a due date:  Health Maintenance Topic Date Due    Shingrix Vaccine Age 49> (1 of 2) Never done    A1C test (Diabetic or Prediabetic)  10/02/2019    Lipid Screen  06/14/2022    Medicare Yearly Exam  12/11/2022    DTaP/Tdap/Td series (2 - Td or Tdap) 05/20/2026    Colorectal Cancer Screening Combo  12/10/2029    Flu Vaccine  Completed    COVID-19 Vaccine  Completed    Hepatitis C Screening  Addressed    Pneumococcal 0-64 years  Aged Out       This is the Subsequent Medicare Annual Wellness Exam, performed 12 months or more after the Initial AWV or the last Subsequent AWV    I have reviewed the patient's medical history in detail and updated the computerized patient record. Assessment/Plan   Education and counseling provided:  Are appropriate based on today's review and evaluation    1. Essential hypertension  2. Mixed hyperlipidemia  3. Seizure disorder ([facility] [address]  4. History of traumatic brain injury  5. Bipolar disorder in full remission, most recent episode unspecified type ([facility] [address]  6. Hyperglycemia  -     AMB POC HEMOGLOBIN A1C  7.  Medicare annual wellness visit, subsequent       Depression Risk Factor Screening     3 most recent PHQ Screens 12/10/2021   Little interest or pleasure in doing things Not at all   Feeling down, depressed, irritable, or hopeless Not at all   Total Score PHQ 2 0   Trouble falling or staying asleep, or sleeping too much -   Feeling tired or having little energy -   Poor appetite, weight loss, or overeating -   Feeling bad about yourself - or that you are a failure or have let yourself or your family down -   Trouble concentrating on things such as school, work, reading, or watching TV -   Moving or speaking so slowly that other people could have noticed; or the opposite being so fidgety that others notice -   Thoughts of being better off dead, or hurting yourself in some way -   PHQ 9 Score -       Alcohol Risk Screen    Do you average more than 2 drinks per night or 14 drinks a week: No    On any one occasion in the past three months have you have had more than 4 drinks containing alcohol:  No        Functional Ability and Level of Safety    Hearing: Hearing is good. Activities of Daily Living: The home contains: no safety equipment. Patient needs help with:  transportation, shopping, preparing meals, laundry, housework, managing medications and managing money      Ambulation: with no difficulty     Fall Risk:  Fall Risk Assessment, last 12 mths 12/10/2021   Able to walk? Yes   Fall in past 12 months? 0   Do you feel unsteady? 0   Are you worried about falling 0      Abuse Screen:  Patient is not abused       Cognitive Screening    Has your family/caregiver stated any concerns about your memory: no     Cognitive Screening: A+O x 2    Health Maintenance Due     Health Maintenance Due   Topic Date Due    Shingrix Vaccine Age 50> (1 of 2) Never done    A1C test (Diabetic or Prediabetic)  10/02/2019       Patient Care Team   Patient Care Team:  Sonido Sandoval DO as PCP - General (Internal Medicine)  Sonido Sandoval DO as PCP - REHABILITATION [facility] Empaneled Provider  Vania Skinner MD as Physician (Ophthalmology)    History     Patient Active Problem List   Diagnosis Code    Hip arthritis M16.10    Seizure disorder ([address] G40.909    Seizures ([address] R56.9   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 9) E66.9    Mixed hyperlipidemia E78.2    Memory impairment R41.3    Bipolar disorder in full remission ([address] F31.70    Hyperglycemia R73.9    Esophageal dysphagia R13.19    Gastroesophageal reflux disease without esophagitis K21.9    History of traumatic brain injury Z87.820    Abnormal brain MRI R90.89    Status post left hip replacement Z96.642    Sebaceous cyst L72.3     Past Medical History:   Diagnosis Date    Anxiety     Arthritis     Brain trauma ([facility] [address]     yuri kelly md psychiatry    Constipation     Glaucoma     Hypercholesterolemia     Hypertension     Intellectual disability     [facility]    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     rp  abhay walker -[phone]    S/P colonoscopy 4-6-12    rt n    S/P total hip arthroplasty     left    Sebaceous cyst 4/27/2020    Seizures ([facility] [address]       Past Surgical History:   Procedure Laterality Date    COLONOSCOPY N/A 12/10/2019    COLONOSCOPY performed by Gaston Orlando MD at [address] [place] [address] ENDOSCOPY     Current Outpatient Medications   Medication Sig Dispense Refill    indapamide (LOZOL) 1.25 mg tablet TAKE 1 TABLET BY MOUTH DAILY 30 Tablet 0    hydrALAZINE (APRESOLINE) 100 mg tablet TAKE ONE (1) TABLET BY MOUTH THREE TIMES A DAY. 90 Tablet 0    lisinopriL (PRINIVIL, ZESTRIL) 40 mg tablet TAKE 1 TABLET BY MOUTH DAILY 30 Tablet 0    atorvastatin (LIPITOR) 10 mg tablet TAKE ONE TABLET DAILY (FOR CHOLESTEROL) 30 Tablet PRN    amLODIPine (NORVASC) 10 mg tablet TAKE 1 TABLET BY MOUTH DAILY 31 Tablet 5    potassium chloride SR (KLOR-CON 10) 10 mEq tablet TAKE 1 TABLET BY MOUTH DAILY 30 Tablet 5    One Daily Multivitamin tablet TAKE 1 TABLET BY MOUTH DAILY 30 Tablet 5    cloNIDine HCL (CATAPRES) 0.3 mg tablet TAKE 1 TABLET BY MOUTH TWICE A DAY 60 Tablet 11    OTHER Amonium Lactate 12% lotion twice daily.  PHENobarbitaL (LUMINAL) 60 mg tablet TAKE 2 TABLETS BY MOUTH AT BEDTIME. 62 Tablet 5    metoprolol succinate (TOPROL-XL) 50 mg XL tablet TAKE ONE TABLET BY MOUTH AT BEDTIME 31 Tablet 12    cetirizine (ZYRTEC) 10 mg tablet TAKE 1 TABLET BY MOUTH DAILY 31 Tab 12    omeprazole (PRILOSEC) 20 mg capsule TAKE 1 CAPSULE BY MOUTH DAILY 31 Cap 12    fluticasone propionate (FLONASE) 50 mcg/actuation nasal spray 2 Sprays by Both Nostrils route daily.  1 Bottle 11    azelastine (OPTIVAR) 0.05 % ophthalmic solution Administer 1 Drop to both eyes every evening. Use in affected eye(s)      cycloSPORINE (RESTASIS) 0.05 % dpet Administer 1 Drop to both eyes every twelve (12) hours.  memantine (NAMENDA) 10 mg tablet Take 10 mg by mouth two (2) times a day.  OLANZapine (ZYPREXA) 7.5 mg tablet Take 7.5 mg by mouth nightly.  calcium phosphate-vitamin D3 (CITRACAL + D3, CALCIUM PHOS,) 250 mg calcium- 500 unit chew Take 1 Tab by mouth daily. 90 Tab 1    vit A/vit C/vit E/zinc/copper (PRESERVISION AREDS PO) Take  by mouth.  latanoprost (XALATAN) 0.005 % ophthalmic solution       alprazolam (XANAX) 0.25 mg tablet Take 0.125 mg by mouth two (2) times a day.  busPIRone (BUSPAR) 15 mg tablet Take 15 mg by mouth two (2) times a day.        No Known Allergies    Family History   Problem Relation Age of Onset    No Known Problems Mother     No Known Problems Father      Social History     Tobacco Use    Smoking status: Never Smoker    Smokeless tobacco: Never Used   Substance Use Topics    Alcohol use: No     Alcohol/week: 0.0 standard drinks         Lincoln Stout DO

## 2021-12-10 NOTE — PROGRESS NOTES
HISTORY OF PRESENT ILLNESS  Zuhair Hinojosa is a 59 y.o. male. Pt. comes in with his  from his group home for f/u. Has a few chronic medical issues as documented. Vital signs are stable. History of TBI with intellectual disability and some memory impairment. Followed by neurology and psychiatry. No recent seizures. No recent ER or hospital is. Depends a staff for some ADLs. No signs or symptoms of COVID-19. Reports feeling well overall. Denies any acute issues today. PMH/PSH/Allergies/Social History/medication list and most recent studies reviewed with patient. Tobacco use: No  Alcohol use: Social  Reports compliance with medications and diet. Trying to be active physically as tolerated. Reports no other new c/o. HPI    Review of Systems   Constitutional: Negative. HENT: Negative. Eyes: Negative. Respiratory: Negative for shortness of breath. Cardiovascular: Negative for chest pain and leg swelling. Gastrointestinal: Positive for heartburn. Negative for abdominal pain and nausea. Genitourinary: Negative for dysuria. Musculoskeletal: Negative for falls and joint pain. Skin: Negative. Neurological: Positive for seizures. Negative for dizziness, sensory change and headaches. Endo/Heme/Allergies: Negative. Psychiatric/Behavioral: Positive for memory loss. Negative for depression. The patient is nervous/anxious. The patient does not have insomnia. All other systems reviewed and are negative. Physical Exam  Vitals and nursing note reviewed. Constitutional:       General: He is not in acute distress. Appearance: He is well-developed. Comments: Pleasant man overweight   HENT:      Head: Normocephalic and atraumatic. Mouth/Throat:      Mouth: Mucous membranes are moist.      Pharynx: Oropharynx is clear. Eyes:      General: No scleral icterus.      Conjunctiva/sclera: Conjunctivae normal.      Comments: Poor vision   Neck:      Thyroid: No thyromegaly. Vascular: No carotid bruit or JVD. Cardiovascular:      Rate and Rhythm: Normal rate and regular rhythm. Heart sounds: Normal heart sounds. No murmur heard. Pulmonary:      Effort: Pulmonary effort is normal. No respiratory distress. Breath sounds: Normal breath sounds. No wheezing or rales. Abdominal:      General: Bowel sounds are normal. There is no distension. Palpations: Abdomen is soft. Tenderness: There is no abdominal tenderness. There is no right CVA tenderness or left CVA tenderness. Comments: obese   Musculoskeletal:         General: No tenderness. Cervical back: Normal range of motion and neck supple. Right lower leg: No edema. Left lower leg: No edema. Skin:     General: Skin is warm and dry. Findings: No rash. Neurological:      Mental Status: He is alert. Coordination: Coordination normal.      Comments: A+O to name, Wes, not date   Doesn't know president's name   Psychiatric:         Behavior: Behavior normal.      Comments: Slow speech  Chronic cognitive deficit         ASSESSMENT and PLAN  Diagnoses and all orders for this visit:    1. Essential hypertension  Stable chronic condition. Continue current treatment/medications. Monitor BP at home with goal of 140/90 or less    2. Mixed hyperlipidemia  Stable chronic condition. Continue current treatment/medications. 3. Seizure disorder (HCC)  Stable chronic condition. Continue current treatment/medications. 4. History of traumatic brain injury    5. Bipolar disorder in full remission, most recent episode unspecified type ([facility] [address]  Stable chronic condition. Continue current treatment/medications. 6. Hyperglycemia  -     AMB POC HEMOGLOBIN A1C -- 5.1     7. Medicare annual wellness visit, subsequent    8. Memory impairment  Stable chronic condition. Continue current treatment/medications.   Brain training exercises discussed    Follow-up and Dispositions · Return in about 6 months (around 6/10/2022). All chronic medical problems are stable  Continue with current medical management and plan  lab results and schedule of future lab studies reviewed with patient  reviewed diet, exercise and weight control  reviewed medications and side effects in detail  F/u with other MD's/ providers as scheduled  COVID-19 precautions discussed with pt  An After Visit Summary was printed and given to the patient.

## 2021-12-28 DIAGNOSIS — G40.909 SEIZURE DISORDER (HCC): ICD-10-CM

## 2021-12-28 RX ORDER — INDAPAMIDE 1.25 MG/1
TABLET, FILM COATED ORAL
Qty: 31 TABLET | Refills: 11 | Status: SHIPPED | OUTPATIENT
Start: 2021-12-28

## 2021-12-28 RX ORDER — HYDRALAZINE HYDROCHLORIDE 100 MG/1
TABLET, FILM COATED ORAL
Qty: 93 TABLET | Refills: 11 | Status: SHIPPED | OUTPATIENT
Start: 2021-12-28

## 2021-12-28 RX ORDER — PHENOBARBITAL 60 MG/1
TABLET ORAL
Qty: 62 TABLET | Refills: 4 | Status: SHIPPED | OUTPATIENT
Start: 2021-12-28

## 2021-12-28 RX ORDER — LISINOPRIL 40 MG/1
TABLET ORAL
Qty: 31 TABLET | Refills: 11 | Status: SHIPPED | OUTPATIENT
Start: 2021-12-28

## 2022-01-14 ENCOUNTER — OFFICE VISIT (OUTPATIENT)
Dept: INTERNAL MEDICINE CLINIC | Age: 62
End: 2022-01-14
Payer: MEDICARE

## 2022-01-14 VITALS
HEIGHT: 70 IN | TEMPERATURE: 97.7 F | RESPIRATION RATE: 20 BRPM | DIASTOLIC BLOOD PRESSURE: 80 MMHG | SYSTOLIC BLOOD PRESSURE: 132 MMHG | BODY MASS INDEX: 30.35 KG/M2 | WEIGHT: 212 LBS | HEART RATE: 68 BPM | OXYGEN SATURATION: 98 %

## 2022-01-14 DIAGNOSIS — L82.1 SEBORRHEIC KERATOSIS: ICD-10-CM

## 2022-01-14 DIAGNOSIS — R41.3 MEMORY IMPAIRMENT: ICD-10-CM

## 2022-01-14 DIAGNOSIS — Z87.820 HISTORY OF TRAUMATIC BRAIN INJURY: ICD-10-CM

## 2022-01-14 DIAGNOSIS — I10 ESSENTIAL HYPERTENSION: Primary | ICD-10-CM

## 2022-01-14 DIAGNOSIS — F31.70 BIPOLAR DISORDER IN FULL REMISSION, MOST RECENT EPISODE UNSPECIFIED TYPE (HCC): ICD-10-CM

## 2022-01-14 DIAGNOSIS — E78.2 MIXED HYPERLIPIDEMIA: ICD-10-CM

## 2022-01-14 PROCEDURE — G8754 DIAS BP LESS 90: HCPCS | Performed by: INTERNAL MEDICINE

## 2022-01-14 PROCEDURE — G0463 HOSPITAL OUTPT CLINIC VISIT: HCPCS | Performed by: INTERNAL MEDICINE

## 2022-01-14 PROCEDURE — G9717 DOC PT DX DEP/BP F/U NT REQ: HCPCS | Performed by: INTERNAL MEDICINE

## 2022-01-14 PROCEDURE — G8752 SYS BP LESS 140: HCPCS | Performed by: INTERNAL MEDICINE

## 2022-01-14 PROCEDURE — G8417 CALC BMI ABV UP PARAM F/U: HCPCS | Performed by: INTERNAL MEDICINE

## 2022-01-14 PROCEDURE — G8427 DOCREV CUR MEDS BY ELIG CLIN: HCPCS | Performed by: INTERNAL MEDICINE

## 2022-01-14 PROCEDURE — 99214 OFFICE O/P EST MOD 30 MIN: CPT | Performed by: INTERNAL MEDICINE

## 2022-01-14 PROCEDURE — 3017F COLORECTAL CA SCREEN DOC REV: CPT | Performed by: INTERNAL MEDICINE

## 2022-01-14 NOTE — PROGRESS NOTES
Health Maintenance Due   Topic Date Due    Shingrix Vaccine Age 49> (1 of 2) Never done       Chief Complaint   Patient presents with    Hypertension    Mole    GERD       1. Have you been to the ER, urgent care clinic since your last visit? Hospitalized since your last visit? No    2. Have you seen or consulted any other health care providers outside of the [address] [place] [address] since your last visit? Include any pap smears or colon screening. No    3) Do you have an Advance Directive on file? no    4) Are you interested in receiving information on Advance Directives? NO      Patient is accompanied by self I have received verbal consent from Ying Ibarra to discuss any/all medical information while they are present in the room.

## 2022-01-24 NOTE — PROGRESS NOTES
HISTORY OF PRESENT ILLNESS  Lb Anthony is a 59 y.o. male. Pt. comes in with his  from his group home for f/u. Has a few chronic medical issues as documented. Vital signs are stable. BMI 30.4. History of TBI with intellectual disability and some memory impairment. Rachel Martines is helping some. Followed by neurology. Has bipolar disorder. Followed by psychiatry. Psychiatric issues are stable on current medications. No recent ER or hospital is. Depends a staff for some ADLs. Has had Covid vaccination. No signs or symptoms of COVID-19. Reports feeling well overall. Denies any acute issues today. PMH/PSH/Allergies/Social History/medication list and most recent studies reviewed with patient. Tobacco use: No  Alcohol use: Social  Reports compliance with medications and diet. Trying to be active physically as tolerated. Reports no other new c/o. HPI    Review of Systems   Constitutional: Negative. HENT: Negative. Eyes: Negative. Respiratory: Negative for shortness of breath. Cardiovascular: Negative for chest pain and leg swelling. Gastrointestinal: Positive for heartburn. Negative for abdominal pain and nausea. Genitourinary: Negative for dysuria. Musculoskeletal: Negative for falls and joint pain. Skin: Negative. Neurological: Negative for dizziness, sensory change, seizures and headaches. Endo/Heme/Allergies: Negative. Psychiatric/Behavioral: Positive for memory loss. Negative for depression. The patient is nervous/anxious. The patient does not have insomnia. All other systems reviewed and are negative. Physical Exam  Vitals and nursing note reviewed. Constitutional:       General: He is not in acute distress. Appearance: He is well-developed. Comments: Pleasant man overweight   HENT:      Head: Normocephalic and atraumatic. Mouth/Throat:      Mouth: Mucous membranes are moist.      Pharynx: Oropharynx is clear.    Eyes:      General: No scleral icterus. Conjunctiva/sclera: Conjunctivae normal.      Comments: Poor vision   Neck:      Thyroid: No thyromegaly. Vascular: No carotid bruit or JVD. Cardiovascular:      Rate and Rhythm: Normal rate and regular rhythm. Heart sounds: Normal heart sounds. No murmur heard. Pulmonary:      Effort: Pulmonary effort is normal. No respiratory distress. Breath sounds: Normal breath sounds. No wheezing or rales. Abdominal:      General: Bowel sounds are normal. There is no distension. Palpations: Abdomen is soft. Tenderness: There is no abdominal tenderness. There is no right CVA tenderness or left CVA tenderness. Comments: obese   Musculoskeletal:         General: No tenderness. Cervical back: Normal range of motion and neck supple. Right lower leg: No edema. Left lower leg: No edema. Skin:     General: Skin is warm and dry. Findings: No rash. Neurological:      Mental Status: He is alert. Coordination: Coordination normal.      Comments: A+O to name, Wes, not date   Doesn't know president's name   Psychiatric:         Behavior: Behavior normal.      Comments: Slow speech  Chronic cognitive deficit         ASSESSMENT and PLAN  Diagnoses and all orders for this visit:    1. Essential hypertension  Stable chronic condition. Continue current treatment/medications. Monitor BP at home with goal of 140/90 or less    2. Mixed hyperlipidemia  Stable chronic condition. Continue current treatment/medications. 3. Memory impairment  Stable chronic condition. Continue current treatment/medications. Continue Namenda  Brain training exercises discussed  4. Bipolar disorder in full remission, most recent episode unspecified type ([facility] [address]  Stable chronic condition. Continue current treatment/medications. Follow-up with psychiatrist as scheduled  5. History of traumatic brain injury    6. Seborrheic keratosis  Stable chronic condition.   Continue current treatment/medications. Continue Nizoral shampoo    Follow-up and Dispositions    · Return in about 6 months (around 7/14/2022). All chronic medical problems are stable  Continue with current medical management and plan  lab results and schedule of future lab studies reviewed with patient  reviewed diet, exercise and weight control  reviewed medications and side effects in detail  F/u with other MD's/ providers as scheduled  COVID-19 precautions discussed with pt  An After Visit Summary was printed and given to the patient.

## 2022-03-18 PROBLEM — L72.3 SEBACEOUS CYST: Status: ACTIVE | Noted: 2020-04-27

## 2022-03-18 PROBLEM — Z87.820 HISTORY OF TRAUMATIC BRAIN INJURY: Status: ACTIVE | Noted: 2019-04-05

## 2022-03-19 PROBLEM — R73.9 HYPERGLYCEMIA: Status: ACTIVE | Noted: 2017-07-07

## 2022-03-19 PROBLEM — Z96.642 STATUS POST LEFT HIP REPLACEMENT: Status: ACTIVE | Noted: 2019-10-22

## 2022-03-19 PROBLEM — K21.9 GASTROESOPHAGEAL REFLUX DISEASE WITHOUT ESOPHAGITIS: Status: ACTIVE | Noted: 2018-10-24

## 2022-03-19 PROBLEM — R41.3 MEMORY IMPAIRMENT: Status: ACTIVE | Noted: 2017-06-20

## 2022-03-19 PROBLEM — L82.1 SEBORRHEIC KERATOSIS: Status: ACTIVE | Noted: 2022-01-14

## 2022-03-19 PROBLEM — F31.70 BIPOLAR DISORDER IN FULL REMISSION (HCC): Status: ACTIVE | Noted: 2017-06-20

## 2022-03-19 PROBLEM — R90.89 ABNORMAL BRAIN MRI: Status: ACTIVE | Noted: 2019-09-17

## 2022-03-20 PROBLEM — R13.19 ESOPHAGEAL DYSPHAGIA: Status: ACTIVE | Noted: 2018-04-20

## 2022-03-29 DIAGNOSIS — J30.2 SEASONAL ALLERGIC RHINITIS, UNSPECIFIED TRIGGER: ICD-10-CM

## 2022-03-29 RX ORDER — OMEPRAZOLE 20 MG/1
CAPSULE, DELAYED RELEASE ORAL
Qty: 31 CAPSULE | Refills: 11 | Status: SHIPPED | OUTPATIENT
Start: 2022-03-29

## 2022-03-29 RX ORDER — CETIRIZINE HCL 10 MG
TABLET ORAL
Qty: 31 TABLET | Refills: 11 | Status: SHIPPED | OUTPATIENT
Start: 2022-03-29

## 2022-06-01 RX ORDER — AMLODIPINE BESYLATE 10 MG/1
TABLET ORAL
Qty: 31 TABLET | Refills: 11 | Status: SHIPPED | OUTPATIENT
Start: 2022-06-01

## 2022-06-03 RX ORDER — METOPROLOL SUCCINATE 50 MG/1
TABLET, EXTENDED RELEASE ORAL
Qty: 31 TABLET | Refills: 11 | Status: SHIPPED | OUTPATIENT
Start: 2022-06-03

## 2022-06-28 RX ORDER — CLONIDINE HYDROCHLORIDE 0.3 MG/1
TABLET ORAL
Qty: 60 TABLET | Refills: 11 | Status: SHIPPED | OUTPATIENT
Start: 2022-06-28

## 2022-11-02 ENCOUNTER — OFFICE VISIT (OUTPATIENT)
Dept: INTERNAL MEDICINE CLINIC | Age: 62
End: 2022-11-02
Payer: MEDICARE

## 2022-11-02 VITALS
HEART RATE: 97 BPM | SYSTOLIC BLOOD PRESSURE: 120 MMHG | DIASTOLIC BLOOD PRESSURE: 82 MMHG | RESPIRATION RATE: 16 BRPM | HEIGHT: 70 IN | OXYGEN SATURATION: 98 % | BODY MASS INDEX: 29.38 KG/M2 | WEIGHT: 205.2 LBS | TEMPERATURE: 98.6 F

## 2022-11-02 DIAGNOSIS — R41.3 MEMORY IMPAIRMENT: ICD-10-CM

## 2022-11-02 DIAGNOSIS — Z87.820 HISTORY OF TRAUMATIC BRAIN INJURY: ICD-10-CM

## 2022-11-02 DIAGNOSIS — I10 ESSENTIAL HYPERTENSION: Primary | ICD-10-CM

## 2022-11-02 DIAGNOSIS — Z96.642 STATUS POST LEFT HIP REPLACEMENT: ICD-10-CM

## 2022-11-02 DIAGNOSIS — E78.2 MIXED HYPERLIPIDEMIA: ICD-10-CM

## 2022-11-02 DIAGNOSIS — F31.70 BIPOLAR DISORDER IN FULL REMISSION, MOST RECENT EPISODE UNSPECIFIED TYPE (HCC): ICD-10-CM

## 2022-11-02 DIAGNOSIS — G40.909 SEIZURE DISORDER (HCC): ICD-10-CM

## 2022-11-02 DIAGNOSIS — R73.03 PREDIABETES: ICD-10-CM

## 2022-11-02 DIAGNOSIS — H40.9 GLAUCOMA OF BOTH EYES, UNSPECIFIED GLAUCOMA TYPE: ICD-10-CM

## 2022-11-02 DIAGNOSIS — H25.9 AGE-RELATED CATARACT OF BOTH EYES, UNSPECIFIED AGE-RELATED CATARACT TYPE: ICD-10-CM

## 2022-11-02 PROCEDURE — G8417 CALC BMI ABV UP PARAM F/U: HCPCS | Performed by: INTERNAL MEDICINE

## 2022-11-02 PROCEDURE — 3017F COLORECTAL CA SCREEN DOC REV: CPT | Performed by: INTERNAL MEDICINE

## 2022-11-02 PROCEDURE — G8427 DOCREV CUR MEDS BY ELIG CLIN: HCPCS | Performed by: INTERNAL MEDICINE

## 2022-11-02 PROCEDURE — G9717 DOC PT DX DEP/BP F/U NT REQ: HCPCS | Performed by: INTERNAL MEDICINE

## 2022-11-02 PROCEDURE — G0463 HOSPITAL OUTPT CLINIC VISIT: HCPCS | Performed by: INTERNAL MEDICINE

## 2022-11-02 PROCEDURE — G8754 DIAS BP LESS 90: HCPCS | Performed by: INTERNAL MEDICINE

## 2022-11-02 PROCEDURE — G8752 SYS BP LESS 140: HCPCS | Performed by: INTERNAL MEDICINE

## 2022-11-02 PROCEDURE — 99214 OFFICE O/P EST MOD 30 MIN: CPT | Performed by: INTERNAL MEDICINE

## 2022-11-02 NOTE — PROGRESS NOTES
Health Maintenance Due   Topic Date Due    Shingrix Vaccine Age 49> (1 of 2) Never done    COVID-19 Vaccine (4 - Booster) 12/29/2021    Lipid Screen  06/14/2022    Flu Vaccine (1) 08/01/2022       Chief Complaint   Patient presents with    Physical    Medication Evaluation    Eye Problem     Eyes have been red    Wax in Ear       1. Have you been to the ER, urgent care clinic since your last visit? Hospitalized since your last visit? No    2. Have you seen or consulted any other health care providers outside of the [address] [place] [address] since your last visit? Include any pap smears or colon screening. No    3) Do you have an Advance Directive on file? no    4) Are you interested in receiving information on Advance Directives? NO      Patient is accompanied by adult caretaker I have received verbal consent from Kuldeep Nunez to discuss any/all medical information while they are present in the room.

## 2022-11-03 LAB
ALBUMIN SERPL-MCNC: 4.7 G/DL (ref 3.8–4.8)
ALBUMIN/CREAT UR: 8 MG/G CREAT (ref 0–29)
ALBUMIN/GLOB SERPL: 1.6 {RATIO} (ref 1.2–2.2)
ALP SERPL-CCNC: 85 IU/L (ref 44–121)
ALT SERPL-CCNC: 14 IU/L (ref 0–44)
AST SERPL-CCNC: 22 IU/L (ref 0–40)
BILIRUB SERPL-MCNC: 0.3 MG/DL (ref 0–1.2)
BUN SERPL-MCNC: 9 MG/DL (ref 8–27)
BUN/CREAT SERPL: 8 (ref 10–24)
CALCIUM SERPL-MCNC: 9.3 MG/DL (ref 8.6–10.2)
CHLORIDE SERPL-SCNC: 95 MMOL/L (ref 96–106)
CHOLEST SERPL-MCNC: 179 MG/DL (ref 100–199)
CO2 SERPL-SCNC: 23 MMOL/L (ref 20–29)
CREAT SERPL-MCNC: 1.09 MG/DL (ref 0.76–1.27)
CREAT UR-MCNC: 141.1 MG/DL
EGFR: 76 ML/MIN/1.73
ERYTHROCYTE [DISTWIDTH] IN BLOOD BY AUTOMATED COUNT: 12.2 % (ref 11.6–15.4)
EST. AVERAGE GLUCOSE BLD GHB EST-MCNC: 108 MG/DL
GLOBULIN SER CALC-MCNC: 2.9 G/DL (ref 1.5–4.5)
GLUCOSE SERPL-MCNC: 93 MG/DL (ref 70–99)
HBA1C MFR BLD: 5.4 % (ref 4.8–5.6)
HCT VFR BLD AUTO: 43.6 % (ref 37.5–51)
HDLC SERPL-MCNC: 43 MG/DL
HGB BLD-MCNC: 15 G/DL (ref 13–17.7)
IMP & REVIEW OF LAB RESULTS: NORMAL
LDLC SERPL CALC-MCNC: 110 MG/DL (ref 0–99)
MCH RBC QN AUTO: 31.8 PG (ref 26.6–33)
MCHC RBC AUTO-ENTMCNC: 34.4 G/DL (ref 31.5–35.7)
MCV RBC AUTO: 93 FL (ref 79–97)
MICROALBUMIN UR-MCNC: 11.7 UG/ML
PLATELET # BLD AUTO: 266 X10E3/UL (ref 150–450)
POTASSIUM SERPL-SCNC: 3.8 MMOL/L (ref 3.5–5.2)
PROT SERPL-MCNC: 7.6 G/DL (ref 6–8.5)
RBC # BLD AUTO: 4.71 X10E6/UL (ref 4.14–5.8)
SODIUM SERPL-SCNC: 139 MMOL/L (ref 134–144)
TRIGL SERPL-MCNC: 144 MG/DL (ref 0–149)
VLDLC SERPL CALC-MCNC: 26 MG/DL (ref 5–40)
WBC # BLD AUTO: 8 X10E3/UL (ref 3.4–10.8)

## 2022-11-03 NOTE — PROGRESS NOTES
LDL cholesterol is mildly elevated. Recommend that patient watch diet for fatty foods and exercise as tolerated.        Stable labs

## 2022-11-29 RX ORDER — MULTIVITAMIN
TABLET ORAL
Qty: 30 TABLET | Refills: 5 | Status: SHIPPED | OUTPATIENT
Start: 2022-11-29

## 2022-11-29 RX ORDER — ATORVASTATIN CALCIUM 10 MG/1
TABLET, FILM COATED ORAL
Qty: 30 TABLET | Refills: 5 | Status: SHIPPED | OUTPATIENT
Start: 2022-11-29

## 2022-11-29 RX ORDER — POTASSIUM CHLORIDE 750 MG/1
TABLET, FILM COATED, EXTENDED RELEASE ORAL
Qty: 30 TABLET | Refills: 5 | Status: SHIPPED | OUTPATIENT
Start: 2022-11-29

## 2022-11-29 NOTE — PROGRESS NOTES
Subjective  Sam Pacheco is a 57 y.o. male. Pt. comes in with his  for f/u. Has a few chronic medical issues as documented. Reports having some redness in the eyes. Has glaucoma. Followed by ophthalmologist.  Uses eyedrops. Vision has been stable. Also concerned about possible earwax. Hearing has been poor recently. All chronic medical issues are stable on current treatment regimen. Denies any issues with  Covid-19 vaccination. PMH/PSH/Allergies/Social History/medication list and most recent studies reviewed with patient. Tobacco use: No  Alcohol use: no   Reports compliance with medications and diet. Trying to be active physically to control weight. Reports no other new c/o.     Past Medical History:   Diagnosis Date    Anxiety     Arthritis     Brain trauma     yuri kelly md psychiatry    Constipation     Glaucoma     Hypercholesterolemia     Hypertension     Intellectual disability     [facility]    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     rp  abhay walker -[phone]    S/P colonoscopy 4-6-12    rt n    S/P total hip arthroplasty     left    Sebaceous cyst 4/27/2020    Seizures (HCC)        No Known Allergies    Current Outpatient Medications on File Prior to Visit   Medication Sig Dispense Refill    cloNIDine HCL (CATAPRES) 0.3 mg tablet TAKE 1 TABLET BY MOUTH TWICE A DAY 60 Tablet 11    metoprolol succinate (TOPROL-XL) 50 mg XL tablet TAKE 1 TABLET BY MOUTH AT BEDTIME 31 Tablet 11    amLODIPine (NORVASC) 10 mg tablet TAKE 1 TABLET BY MOUTH DAILY 31 Tablet 11    potassium chloride SR (KLOR-CON 10) 10 mEq tablet TAKE 1 TABLET BY MOUTH DAILY 30 Tablet 5    One Daily Multivitamin tablet TAKE 1 TABLET BY MOUTH DAILY 30 Tablet 5    cetirizine (ZYRTEC) 10 mg tablet TAKE 1 TABLET BY MOUTH DAILY 31 Tablet 11    omeprazole (PRILOSEC) 20 mg capsule TAKE 1 CAPSULE BY MOUTH DAILY 31 Capsule 11    PHENobarbitaL (LUMINAL) 60 mg tablet TAKE 2 TABLETS BY MOUTH AT BEDTIME. 62 Tablet 4    indapamide (LOZOL) 1.25 mg tablet TAKE 1 TABLET BY MOUTH DAILY 31 Tablet 11    hydrALAZINE (APRESOLINE) 100 mg tablet TAKE ONE (1) TABLET BY MOUTH THREE TIMES A DAY. 93 Tablet 11    lisinopriL (PRINIVIL, ZESTRIL) 40 mg tablet TAKE 1 TABLET BY MOUTH DAILY 31 Tablet 11    atorvastatin (LIPITOR) 10 mg tablet TAKE ONE TABLET DAILY (FOR CHOLESTEROL) 30 Tablet PRN    OTHER Amonium Lactate 12% lotion twice daily.  fluticasone propionate (FLONASE) 50 mcg/actuation nasal spray 2 Sprays by Both Nostrils route daily. 1 Bottle 11    azelastine (OPTIVAR) 0.05 % ophthalmic solution Administer 1 Drop to both eyes every evening. Use in affected eye(s)      cycloSPORINE (RESTASIS) 0.05 % dpet Administer 1 Drop to both eyes every twelve (12) hours.  memantine (NAMENDA) 10 mg tablet Take 10 mg by mouth two (2) times a day.  OLANZapine (ZYPREXA) 7.5 mg tablet Take 7.5 mg by mouth nightly.  calcium phosphate-vitamin D3 (CITRACAL + D3, CALCIUM PHOS,) 250 mg calcium- 500 unit chew Take 1 Tab by mouth daily. 90 Tab 1    vit A/vit C/vit E/zinc/copper (PRESERVISION AREDS PO) Take  by mouth.  latanoprost (XALATAN) 0.005 % ophthalmic solution       ALPRAZolam (XANAX) 0.25 mg tablet Take 0.125 mg by mouth two (2) times a day.  busPIRone (BUSPAR) 15 mg tablet Take 15 mg by mouth two (2) times a day. No current facility-administered medications on file prior to visit. Visit Vitals  Blood Pressure 120/82 (BP 1 Location: Left upper arm, BP Patient Position: Sitting, BP Cuff Size: Adult)   Pulse 97   Temperature 98.6 °F (37 °C) (Oral)   Respiration 16   Height 5' 10\" (1.778 m)   Weight 205 lb 3.2 oz (93.1 kg)   Oxygen Saturation 98%   Body Mass Index 29.44 kg/m²             HPI  Review of Systems   Constitutional: Negative. HENT: Negative. Eyes: Negative. Respiratory:  Negative for shortness of breath.     Cardiovascular:  Negative for chest pain and leg swelling. Gastrointestinal:  Positive for heartburn. Negative for abdominal pain and nausea. Genitourinary:  Negative for dysuria. Musculoskeletal:  Negative for falls and joint pain. Skin: Negative. Neurological:  Negative for dizziness, sensory change, seizures and headaches. Endo/Heme/Allergies: Negative. Psychiatric/Behavioral:  Positive for memory loss. Negative for depression. The patient is nervous/anxious. The patient does not have insomnia. All other systems reviewed and are negative. Objective  Physical Exam  Vitals and nursing note reviewed. Constitutional:       General: He is not in acute distress. Appearance: He is well-developed. Comments: Pleasant man overweight   HENT:      Head: Normocephalic and atraumatic. Right Ear: Tympanic membrane normal.      Left Ear: Tympanic membrane normal.      Mouth/Throat:      Mouth: Mucous membranes are moist.      Pharynx: Oropharynx is clear. Eyes:      General: No scleral icterus. Conjunctiva/sclera: Conjunctivae normal.      Comments: Poor vision. Eye redness. Neck:      Thyroid: No thyromegaly. Vascular: No carotid bruit or JVD. Cardiovascular:      Rate and Rhythm: Normal rate and regular rhythm. Heart sounds: Normal heart sounds. No murmur heard. Pulmonary:      Effort: Pulmonary effort is normal. No respiratory distress. Breath sounds: Normal breath sounds. No wheezing or rales. Abdominal:      General: Bowel sounds are normal. There is no distension. Palpations: Abdomen is soft. Tenderness: There is no abdominal tenderness. There is no right CVA tenderness or left CVA tenderness. Comments: obese   Musculoskeletal:         General: No tenderness. Cervical back: Normal range of motion and neck supple. Right lower leg: No edema. Left lower leg: No edema. Skin:     General: Skin is warm and dry. Findings: No rash.    Neurological: Mental Status: He is alert. Coordination: Coordination normal.      Comments: A+O to name, Wes, not date   Doesn't know president's name   Psychiatric:         Behavior: Behavior normal.      Comments: Slow speech  Chronic cognitive deficit        Assessment & Plan    ICD-10-CM ICD-9-CM    1. Essential hypertension  W71 363.3 METABOLIC PANEL, COMPREHENSIVE      CBC W/O DIFF      2. Mixed hyperlipidemia  E78.2 272.2 LIPID PANEL      METABOLIC PANEL, COMPREHENSIVE      CBC W/O DIFF      3. History of traumatic brain injury  Z87.820 V15.52       4. Memory impairment  R41.3 780.93       5. Bipolar disorder in full remission, most recent episode unspecified type ([facility] [address]  F31.70 296.80       6. Seizure disorder ([facility] [address]  G40.909 345.90       7. Status post left hip replacement  Z96.642 V43.64       8. Prediabetes  R73.03 790.29 LIPID PANEL      METABOLIC PANEL, COMPREHENSIVE      MICROALBUMIN, UR, RAND W/ MICROALB/CREAT RATIO      HEMOGLOBIN A1C WITH EAG      9. Glaucoma of both eyes, unspecified glaucoma type  H40.9 365.9       10. Age-related cataract of both eyes, unspecified age-related cataract type  H25.9 366.10         Orders Placed This Encounter    LIPID PANEL     Standing Status:   Future     Standing Expiration Date:   03/2/6619    METABOLIC PANEL, COMPREHENSIVE     Standing Status:   Future     Standing Expiration Date:   11/2/2023    MICROALBUMIN, UR, RAND W/ MICROALB/CREAT RATIO     Standing Status:   Future     Standing Expiration Date:   11/2/2023    CBC W/O DIFF     Standing Status:   Future     Standing Expiration Date:   11/2/2023    HEMOGLOBIN A1C WITH EAG     Standing Status:   Future     Standing Expiration Date:   99/3/9950    METABOLIC PANEL, COMPREHENSIVE    CBC W/O DIFF    LIPID PANEL    MICROALBUMIN, UR, RAND W/ MICROALB/CREAT RATIO    HEMOGLOBIN A1C WITH EAG    CVD REPORT     Follow-up and Dispositions    Return in about 6 months (around 5/2/2023).      All chronic medical problems are stable  Continue with current medical management and plan  lab results and schedule of future lab studies reviewed with patient  reviewed diet, exercise and weight control  reviewed medications and side effects in detail  F/u with other MD's/ providers as scheduled  COVID-19 precautions discussed with pt  An After Visit Summary was printed and given to the patient.     Lo Rush DO

## 2022-12-01 DIAGNOSIS — R05.3 CHRONIC COUGH: Primary | ICD-10-CM

## 2022-12-01 RX ORDER — GUAIFENESIN 100 MG/5ML
200 SOLUTION ORAL
Qty: 118 ML | Refills: 1 | Status: SHIPPED | OUTPATIENT
Start: 2022-12-01

## 2022-12-01 NOTE — TELEPHONE ENCOUNTER
Assisted living is requesting a script of Robitussin for chronic cough. Requested Prescriptions     Pending Prescriptions Disp Refills    guaiFENesin (ROBITUSSIN) 100 mg/5 mL liquid 118 mL 0     Sig: Take 10 mL by mouth three (3) times daily as needed for Cough.          [address] [place] [address] [place] [address]  Phone: [phone] Fax: [phone]       11/2/2022 is LAST OFFICE VISIT     Future Appointments   Date Time Provider Kelley Echavarria   5/2/2023  1:30 PM DO PRASANTH Cantrell BS AMB

## 2022-12-29 RX ORDER — LISINOPRIL 40 MG/1
TABLET ORAL
Qty: 31 TABLET | Refills: 11 | Status: SHIPPED | OUTPATIENT
Start: 2022-12-29

## 2022-12-29 RX ORDER — INDAPAMIDE 1.25 MG/1
TABLET, FILM COATED ORAL
Qty: 31 TABLET | Refills: 11 | Status: SHIPPED | OUTPATIENT
Start: 2022-12-29

## 2022-12-29 RX ORDER — HYDRALAZINE HYDROCHLORIDE 100 MG/1
TABLET, FILM COATED ORAL
Qty: 93 TABLET | Refills: 11 | Status: SHIPPED | OUTPATIENT
Start: 2022-12-29

## 2023-03-31 DIAGNOSIS — J30.2 SEASONAL ALLERGIC RHINITIS, UNSPECIFIED TRIGGER: ICD-10-CM

## 2023-03-31 RX ORDER — OMEPRAZOLE 20 MG/1
CAPSULE, DELAYED RELEASE ORAL
Qty: 31 CAPSULE | Refills: 12 | Status: SHIPPED | OUTPATIENT
Start: 2023-03-31

## 2023-03-31 RX ORDER — CETIRIZINE HYDROCHLORIDE 10 MG/1
TABLET ORAL
Qty: 31 TABLET | Refills: 12 | Status: SHIPPED | OUTPATIENT
Start: 2023-03-31

## 2023-04-28 RX ORDER — METOPROLOL SUCCINATE 50 MG/1
TABLET, EXTENDED RELEASE ORAL
Qty: 30 TABLET | Refills: 12 | OUTPATIENT
Start: 2023-04-28

## 2023-05-01 RX ORDER — METOPROLOL SUCCINATE 50 MG/1
TABLET, EXTENDED RELEASE ORAL
Qty: 30 TABLET | Refills: 11 | Status: SHIPPED | OUTPATIENT
Start: 2023-05-01

## 2023-05-30 DIAGNOSIS — I10 PRIMARY HYPERTENSION: Primary | ICD-10-CM

## 2023-05-30 RX ORDER — AMLODIPINE BESYLATE 10 MG/1
TABLET ORAL DAILY
Qty: 90 TABLET | Refills: 0 | Status: SHIPPED | OUTPATIENT
Start: 2023-05-30

## 2023-05-30 NOTE — TELEPHONE ENCOUNTER
Requested Prescriptions     Pending Prescriptions Disp Refills    amLODIPine (NORVASC) 10 MG tablet [Pharmacy Med Name: AMLODIPINE BESYLATE 10 MG TAB] 90 tablet 0     Sig: TAKE 1 TABLET BY MOUTH DAILY         [facility] [address] [phone] Purvi Sol [phone]  [address] [place] [address]  Phone: [phone] Fax: [phone]       Last appt 1/14/2022      Future Appointments   Date Time Provider Akash Rubio   6/7/2023  1:15 PM DO LARS Oswald BS AMB

## 2023-06-07 DIAGNOSIS — R41.3 MEMORY IMPAIRMENT: ICD-10-CM

## 2023-06-07 DIAGNOSIS — I10 ESSENTIAL (PRIMARY) HYPERTENSION: Primary | ICD-10-CM

## 2023-06-07 DIAGNOSIS — H40.9 GLAUCOMA OF BOTH EYES, UNSPECIFIED GLAUCOMA TYPE: ICD-10-CM

## 2023-06-07 DIAGNOSIS — F31.70 BIPOLAR DISORDER, CURRENTLY IN REMISSION, MOST RECENT EPISODE UNSPECIFIED (HCC): ICD-10-CM

## 2023-06-07 DIAGNOSIS — E78.2 MIXED HYPERLIPIDEMIA: ICD-10-CM

## 2023-06-07 DIAGNOSIS — K21.9 GASTRO-ESOPHAGEAL REFLUX DISEASE WITHOUT ESOPHAGITIS: ICD-10-CM

## 2023-06-07 DIAGNOSIS — I10 PRIMARY HYPERTENSION: ICD-10-CM

## 2023-06-07 DIAGNOSIS — G40.909 NONINTRACTABLE EPILEPSY WITHOUT STATUS EPILEPTICUS, UNSPECIFIED EPILEPSY TYPE (HCC): ICD-10-CM

## 2023-06-07 RX ORDER — PHENOBARBITAL 60 MG/1
120 TABLET ORAL NIGHTLY
Qty: 60 TABLET | Refills: 3 | Status: SHIPPED | OUTPATIENT
Start: 2023-06-07 | End: 2023-10-05

## 2023-06-07 RX ORDER — ATORVASTATIN CALCIUM 10 MG/1
TABLET, FILM COATED ORAL
Qty: 90 TABLET | Refills: 0 | Status: SHIPPED | OUTPATIENT
Start: 2023-06-07

## 2023-06-07 RX ORDER — AMLODIPINE BESYLATE 10 MG/1
10 TABLET ORAL DAILY
Qty: 90 TABLET | Refills: 0 | Status: SHIPPED | OUTPATIENT
Start: 2023-06-07

## 2023-06-07 RX ORDER — OLANZAPINE 7.5 MG/1
7.5 TABLET ORAL NIGHTLY
Qty: 90 TABLET | Refills: 0 | Status: SHIPPED | OUTPATIENT
Start: 2023-06-07

## 2023-06-07 RX ORDER — LISINOPRIL 40 MG/1
40 TABLET ORAL DAILY
Qty: 90 TABLET | Refills: 0 | Status: SHIPPED | OUTPATIENT
Start: 2023-06-07

## 2023-06-07 RX ORDER — GUAIFENESIN 200 MG/10ML
200 LIQUID ORAL 3 TIMES DAILY PRN
Qty: 180 ML | Refills: 1 | Status: SHIPPED | OUTPATIENT
Start: 2023-06-07

## 2023-06-07 RX ORDER — CLONIDINE HYDROCHLORIDE 0.3 MG/1
0.3 TABLET ORAL 2 TIMES DAILY
Qty: 90 TABLET | Refills: 0 | Status: SHIPPED | OUTPATIENT
Start: 2023-06-07

## 2023-06-07 RX ORDER — METOPROLOL SUCCINATE 50 MG/1
50 TABLET, EXTENDED RELEASE ORAL NIGHTLY
Qty: 90 TABLET | Refills: 0 | Status: SHIPPED | OUTPATIENT
Start: 2023-06-07

## 2023-06-07 RX ORDER — MEMANTINE HYDROCHLORIDE 10 MG/1
10 TABLET ORAL 2 TIMES DAILY
Qty: 120 TABLET | Refills: 0 | Status: SHIPPED | OUTPATIENT
Start: 2023-06-07

## 2023-06-07 RX ORDER — CETIRIZINE HYDROCHLORIDE 10 MG/1
10 TABLET ORAL DAILY
Qty: 90 TABLET | Refills: 0 | Status: SHIPPED | OUTPATIENT
Start: 2023-06-07

## 2023-06-07 RX ORDER — INDAPAMIDE 1.25 MG/1
1.25 TABLET, FILM COATED ORAL DAILY
Qty: 90 TABLET | Refills: 0 | Status: SHIPPED | OUTPATIENT
Start: 2023-06-07

## 2023-06-07 RX ORDER — CYCLOSPORINE 0.5 MG/ML
1 EMULSION OPHTHALMIC EVERY 12 HOURS
Qty: 5.5 ML | Refills: 1 | Status: SHIPPED | OUTPATIENT
Start: 2023-06-07

## 2023-06-07 RX ORDER — LATANOPROST 50 UG/ML
1 SOLUTION/ DROPS OPHTHALMIC DAILY
Qty: 7.5 ML | Refills: 1 | Status: SHIPPED | OUTPATIENT
Start: 2023-06-07

## 2023-06-07 RX ORDER — OMEPRAZOLE 20 MG/1
20 CAPSULE, DELAYED RELEASE ORAL DAILY
Qty: 90 CAPSULE | Refills: 0 | Status: SHIPPED | OUTPATIENT
Start: 2023-06-07

## 2023-06-07 RX ORDER — POTASSIUM CHLORIDE 750 MG/1
10 TABLET, FILM COATED, EXTENDED RELEASE ORAL DAILY
Qty: 90 TABLET | Refills: 0 | Status: SHIPPED | OUTPATIENT
Start: 2023-06-07

## 2023-06-07 RX ORDER — HYDRALAZINE HYDROCHLORIDE 100 MG/1
TABLET, FILM COATED ORAL
Qty: 90 TABLET | Refills: 0 | Status: SHIPPED | OUTPATIENT
Start: 2023-06-07

## 2023-06-07 NOTE — TELEPHONE ENCOUNTER
----- Message from [facility] sent at 6/7/2023 10:34 AM EDT -----  Subject: Refill Request    QUESTIONS  Name of Medication? potassium chloride (KLOR-CON) 10 MEQ extended release   tablet  Patient-reported dosage and instructions? Take 1 tablet by mouth daily  How many days do you have left? 7  Preferred Pharmacy? [address] phone number (if available)? [phone]  ---------------------------------------------------------------------------  --------------,  Name of Medication? amLODIPine (NORVASC) 10 MG tablet  Patient-reported dosage and instructions? TAKE 1 TABLET BY MOUTH DAILY  How many days do you have left? 7  Preferred Pharmacy? [address] phone number (if available)? [phone]  ---------------------------------------------------------------------------  --------------  CALL BACK INFO  What is the best way for the office to contact you? OK to leave message on   voicemail  Preferred Call Back Phone Number? [phone]  ---------------------------------------------------------------------------  --------------  SCRIPT ANSWERS  Relationship to Patient? Covered Entity  Covered Entity Type? [address]? Representative Name?  Armida Fermin
[phone]  ---------------------------------------------------------------------------  --------------  Herb OLIVEIRA  What is the best way for the office to contact you? OK to leave message on   voicemail  Preferred Call Back Phone Number? [phone]  ---------------------------------------------------------------------------  --------------  SCRIPT ANSWERS  Relationship to Patient? Covered Entity  Covered Entity Type? [address]? Representative Name?  Severiano Xavier

## 2023-07-31 DIAGNOSIS — H40.9 GLAUCOMA OF BOTH EYES, UNSPECIFIED GLAUCOMA TYPE: ICD-10-CM

## 2023-07-31 DIAGNOSIS — I10 ESSENTIAL (PRIMARY) HYPERTENSION: ICD-10-CM

## 2023-07-31 RX ORDER — CYCLOSPORINE 0.5 MG/ML
EMULSION OPHTHALMIC
Qty: 60 EACH | Refills: 12 | Status: SHIPPED | OUTPATIENT
Start: 2023-07-31

## 2023-07-31 RX ORDER — CLONIDINE HYDROCHLORIDE 0.3 MG/1
TABLET ORAL
Qty: 62 TABLET | Refills: 11 | Status: SHIPPED | OUTPATIENT
Start: 2023-07-31

## 2023-08-15 RX ORDER — IBUPROFEN 800 MG/1
800 TABLET ORAL EVERY 8 HOURS PRN
Qty: 30 TABLET | Refills: 1 | Status: SHIPPED | OUTPATIENT
Start: 2023-08-15

## 2023-08-15 NOTE — TELEPHONE ENCOUNTER
Requested Prescriptions     Pending Prescriptions Disp Refills    ibuprofen (ADVIL;MOTRIN) 800 MG tablet [Pharmacy Med Name: IBUPROFEN 800 MG TABLET] 120 tablet 0     Sig: TAKE 1 TABLET BY MOUTH EVERY 6 TO 8 HOURS AS NEEDED FOR PAIN CONTROL         [facility], [address] [place] [address] [phone] Deejay Lee [phone]  [address] [address] [place] [address]  Phone: [phone] Fax: [phone]       Last appt 1/14/2022      Future Appointments   Date Time Provider [address] [place] [address]   8/16/2023  2:15 PM DO LARS Shields BS AMB

## 2023-08-16 ENCOUNTER — OFFICE VISIT (OUTPATIENT)
Age: 63
End: 2023-08-16
Payer: MEDICARE

## 2023-08-16 DIAGNOSIS — Z87.820 PERSONAL HISTORY OF TRAUMATIC BRAIN INJURY: ICD-10-CM

## 2023-08-16 DIAGNOSIS — E78.2 MIXED HYPERLIPIDEMIA: ICD-10-CM

## 2023-08-16 DIAGNOSIS — I10 ESSENTIAL (PRIMARY) HYPERTENSION: Primary | ICD-10-CM

## 2023-08-16 DIAGNOSIS — F31.70 BIPOLAR DISORDER, CURRENTLY IN REMISSION, MOST RECENT EPISODE UNSPECIFIED (HCC): ICD-10-CM

## 2023-08-16 DIAGNOSIS — Z00.00 MEDICARE ANNUAL WELLNESS VISIT, SUBSEQUENT: ICD-10-CM

## 2023-08-16 DIAGNOSIS — H40.9 GLAUCOMA OF BOTH EYES, UNSPECIFIED GLAUCOMA TYPE: ICD-10-CM

## 2023-08-16 DIAGNOSIS — G40.909 NONINTRACTABLE EPILEPSY WITHOUT STATUS EPILEPTICUS, UNSPECIFIED EPILEPSY TYPE (HCC): ICD-10-CM

## 2023-08-16 PROBLEM — G30.9 ALZHEIMER'S DISEASE, UNSPECIFIED (CODE) (HCC): Status: ACTIVE | Noted: 2023-08-16
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
## 2023-08-16 PROCEDURE — G0439 PPPS, SUBSEQ VISIT: HCPCS | Performed by: INTERNAL MEDICINE

## 2023-08-16 PROCEDURE — 3017F COLORECTAL CA SCREEN DOC REV: CPT | Performed by: INTERNAL MEDICINE

## 2023-08-16 PROCEDURE — 3074F SYST BP LT 130 MM HG: CPT | Performed by: INTERNAL MEDICINE

## 2023-08-16 PROCEDURE — 99214 OFFICE O/P EST MOD 30 MIN: CPT | Performed by: INTERNAL MEDICINE

## 2023-08-16 PROCEDURE — 1036F TOBACCO NON-USER: CPT | Performed by: INTERNAL MEDICINE

## 2023-08-16 PROCEDURE — 3078F DIAST BP <80 MM HG: CPT | Performed by: INTERNAL MEDICINE

## 2023-08-16 PROCEDURE — G8427 DOCREV CUR MEDS BY ELIG CLIN: HCPCS | Performed by: INTERNAL MEDICINE

## 2023-08-16 PROCEDURE — G8419 CALC BMI OUT NRM PARAM NOF/U: HCPCS | Performed by: INTERNAL MEDICINE

## 2023-08-30 DIAGNOSIS — I10 PRIMARY HYPERTENSION: ICD-10-CM

## 2023-08-30 RX ORDER — POTASSIUM CHLORIDE 750 MG/1
10 TABLET, FILM COATED, EXTENDED RELEASE ORAL DAILY
Qty: 90 TABLET | Refills: 1 | Status: SHIPPED | OUTPATIENT
Start: 2023-08-30

## 2023-08-30 NOTE — TELEPHONE ENCOUNTER
Requested Prescriptions     Pending Prescriptions Disp Refills    potassium chloride (KLOR-CON) 10 MEQ extended release tablet [Pharmacy Med Name: POTASSIUM CL ER 10 MEQ TABLET] 90 tablet 1     Sig: TAKE 1 TABLET BY [address] [address] Domenic Jasso Real, [facility] [phone] Trell Merino [phone]  [address] [place] [address]  Phone: [phone] Fax: [phone]       Last appt 8/16/2023      Future Appointments   Date Time Provider [address] [place] [address]   12/5/2023  8:20 AM Ericka Mitchell MD NEUMELVIN BS AMB   2/16/2024 10:15 AM DO LARS Guerin BS AMB

## 2023-08-31 VITALS
DIASTOLIC BLOOD PRESSURE: 88 MMHG | BODY MASS INDEX: 29.84 KG/M2 | TEMPERATURE: 98.2 F | HEART RATE: 61 BPM | OXYGEN SATURATION: 97 % | HEIGHT: 70 IN | SYSTOLIC BLOOD PRESSURE: 139 MMHG | WEIGHT: 208.4 LBS | RESPIRATION RATE: 20 BRPM

## 2023-08-31 ASSESSMENT — ENCOUNTER SYMPTOMS
ALLERGIC/IMMUNOLOGIC NEGATIVE: 1
ABDOMINAL PAIN: 0
RESPIRATORY NEGATIVE: 1
GASTROINTESTINAL NEGATIVE: 1
SHORTNESS OF BREATH: 0

## 2023-09-26 DIAGNOSIS — R41.3 MEMORY IMPAIRMENT: ICD-10-CM

## 2023-09-26 NOTE — TELEPHONE ENCOUNTER
Requested Prescriptions     Pending Prescriptions Disp Refills    memantine (NAMENDA) 10 MG tablet [Pharmacy Med Name: MEMANTINE HCL 10 MG TABLET] 60 tablet 12     Sig: TAKE 1 TABLET BY MOUTH TWICE A DAY         [address] [phone] Asha Zhou [phone]  [address] [address] [place] [address]  Phone: [phone] Fax: [phone]       Last appt 8/16/2023      Future Appointments   Date Time Provider [address] [place] [address]   12/5/2023  8:20 AM MD TERRELL Luna BS AMB   2/16/2024 10:15 AM DO LARS Vasquez BS AMB

## 2023-09-27 RX ORDER — MEMANTINE HYDROCHLORIDE 10 MG/1
10 TABLET ORAL 2 TIMES DAILY
Qty: 60 TABLET | Refills: 12 | Status: SHIPPED | OUTPATIENT
Start: 2023-09-27

## 2023-11-21 ENCOUNTER — OFFICE VISIT (OUTPATIENT)
Age: 63
End: 2023-11-21
Payer: MEDICARE

## 2023-11-21 VITALS
HEART RATE: 88 BPM | BODY MASS INDEX: 29.78 KG/M2 | DIASTOLIC BLOOD PRESSURE: 80 MMHG | HEIGHT: 70 IN | WEIGHT: 208 LBS | SYSTOLIC BLOOD PRESSURE: 130 MMHG | TEMPERATURE: 96.8 F | RESPIRATION RATE: 16 BRPM | OXYGEN SATURATION: 99 %

## 2023-11-21 DIAGNOSIS — H40.9 GLAUCOMA OF BOTH EYES, UNSPECIFIED GLAUCOMA TYPE: ICD-10-CM

## 2023-11-21 DIAGNOSIS — G30.9 ALZHEIMER'S DISEASE, UNSPECIFIED (CODE) (HCC): ICD-10-CM

## 2023-11-21 DIAGNOSIS — G40.909 NONINTRACTABLE EPILEPSY WITHOUT STATUS EPILEPTICUS, UNSPECIFIED EPILEPSY TYPE (HCC): ICD-10-CM

## 2023-11-21 DIAGNOSIS — I10 PRIMARY HYPERTENSION: Primary | ICD-10-CM

## 2023-11-21 DIAGNOSIS — E78.2 MIXED HYPERLIPIDEMIA: ICD-10-CM

## 2023-11-21 DIAGNOSIS — I10 PRIMARY HYPERTENSION: ICD-10-CM

## 2023-11-21 DIAGNOSIS — F31.70 BIPOLAR DISORDER, CURRENTLY IN REMISSION, MOST RECENT EPISODE UNSPECIFIED (HCC): ICD-10-CM

## 2023-11-21 DIAGNOSIS — Z87.820 PERSONAL HISTORY OF TRAUMATIC BRAIN INJURY: ICD-10-CM

## 2023-11-21 PROCEDURE — 3075F SYST BP GE 130 - 139MM HG: CPT | Performed by: INTERNAL MEDICINE

## 2023-11-21 PROCEDURE — 1036F TOBACCO NON-USER: CPT | Performed by: INTERNAL MEDICINE

## 2023-11-21 PROCEDURE — 3079F DIAST BP 80-89 MM HG: CPT | Performed by: INTERNAL MEDICINE

## 2023-11-21 PROCEDURE — 1123F ACP DISCUSS/DSCN MKR DOCD: CPT | Performed by: INTERNAL MEDICINE

## 2023-11-21 PROCEDURE — G8419 CALC BMI OUT NRM PARAM NOF/U: HCPCS | Performed by: INTERNAL MEDICINE

## 2023-11-21 PROCEDURE — G8427 DOCREV CUR MEDS BY ELIG CLIN: HCPCS | Performed by: INTERNAL MEDICINE

## 2023-11-21 PROCEDURE — 3017F COLORECTAL CA SCREEN DOC REV: CPT | Performed by: INTERNAL MEDICINE

## 2023-11-21 PROCEDURE — 99214 OFFICE O/P EST MOD 30 MIN: CPT | Performed by: INTERNAL MEDICINE

## 2023-11-21 PROCEDURE — G8484 FLU IMMUNIZE NO ADMIN: HCPCS | Performed by: INTERNAL MEDICINE

## 2023-11-21 SDOH — ECONOMIC STABILITY: HOUSING INSECURITY
IN THE LAST 12 MONTHS, WAS THERE A TIME WHEN YOU DID NOT HAVE A STEADY PLACE TO SLEEP OR SLEPT IN A SHELTER (INCLUDING NOW)?: NO

## 2023-11-21 SDOH — ECONOMIC STABILITY: FOOD INSECURITY: WITHIN THE PAST 12 MONTHS, THE FOOD YOU BOUGHT JUST DIDN'T LAST AND YOU DIDN'T HAVE MONEY TO GET MORE.: NEVER TRUE

## 2023-11-21 SDOH — ECONOMIC STABILITY: FOOD INSECURITY: WITHIN THE PAST 12 MONTHS, YOU WORRIED THAT YOUR FOOD WOULD RUN OUT BEFORE YOU GOT MONEY TO BUY MORE.: NEVER TRUE

## 2023-11-21 SDOH — ECONOMIC STABILITY: INCOME INSECURITY: HOW HARD IS IT FOR YOU TO PAY FOR THE VERY BASICS LIKE FOOD, HOUSING, MEDICAL CARE, AND HEATING?: NOT VERY HARD

## 2023-11-21 ASSESSMENT — ANXIETY QUESTIONNAIRES
GAD7 TOTAL SCORE: 21
3. WORRYING TOO MUCH ABOUT DIFFERENT THINGS: 3
4. TROUBLE RELAXING: 3
5. BEING SO RESTLESS THAT IT IS HARD TO SIT STILL: 3
7. FEELING AFRAID AS IF SOMETHING AWFUL MIGHT HAPPEN: 3
2. NOT BEING ABLE TO STOP OR CONTROL WORRYING: 3
1. FEELING NERVOUS, ANXIOUS, OR ON EDGE: 3
IF YOU CHECKED OFF ANY PROBLEMS ON THIS QUESTIONNAIRE, HOW DIFFICULT HAVE THESE PROBLEMS MADE IT FOR YOU TO DO YOUR WORK, TAKE CARE OF THINGS AT HOME, OR GET ALONG WITH OTHER PEOPLE: VERY DIFFICULT
6. BECOMING EASILY ANNOYED OR IRRITABLE: 3

## 2023-11-21 ASSESSMENT — ENCOUNTER SYMPTOMS
ABDOMINAL PAIN: 0
SHORTNESS OF BREATH: 0
GASTROINTESTINAL NEGATIVE: 1
RESPIRATORY NEGATIVE: 1
ALLERGIC/IMMUNOLOGIC NEGATIVE: 1

## 2023-11-21 ASSESSMENT — PATIENT HEALTH QUESTIONNAIRE - PHQ9
SUM OF ALL RESPONSES TO PHQ QUESTIONS 1-9: 0
6. FEELING BAD ABOUT YOURSELF - OR THAT YOU ARE A FAILURE OR HAVE LET YOURSELF OR YOUR FAMILY DOWN: 0
SUM OF ALL RESPONSES TO PHQ9 QUESTIONS 1 & 2: 0
4. FEELING TIRED OR HAVING LITTLE ENERGY: 0
3. TROUBLE FALLING OR STAYING ASLEEP: 0
8. MOVING OR SPEAKING SO SLOWLY THAT OTHER PEOPLE COULD HAVE NOTICED. OR THE OPPOSITE, BEING SO FIGETY OR RESTLESS THAT YOU HAVE BEEN MOVING AROUND A LOT MORE THAN USUAL: 0
9. THOUGHTS THAT YOU WOULD BE BETTER OFF DEAD, OR OF HURTING YOURSELF: 0
2. FEELING DOWN, DEPRESSED OR HOPELESS: 0
10. IF YOU CHECKED OFF ANY PROBLEMS, HOW DIFFICULT HAVE THESE PROBLEMS MADE IT FOR YOU TO DO YOUR WORK, TAKE CARE OF THINGS AT HOME, OR GET ALONG WITH OTHER PEOPLE: 0
1. LITTLE INTEREST OR PLEASURE IN DOING THINGS: 0
SUM OF ALL RESPONSES TO PHQ QUESTIONS 1-9: 0
7. TROUBLE CONCENTRATING ON THINGS, SUCH AS READING THE NEWSPAPER OR WATCHING TELEVISION: 0
5. POOR APPETITE OR OVEREATING: 0

## 2023-11-21 ASSESSMENT — COLUMBIA-SUICIDE SEVERITY RATING SCALE - C-SSRS
5. HAVE YOU STARTED TO WORK OUT OR WORKED OUT THE DETAILS OF HOW TO KILL YOURSELF? DO YOU INTEND TO CARRY OUT THIS PLAN?: NO
4. HAVE YOU HAD THESE THOUGHTS AND HAD SOME INTENTION OF ACTING ON THEM?: NO
3. HAVE YOU BEEN THINKING ABOUT HOW YOU MIGHT KILL YOURSELF?: NO
7. DID THIS OCCUR IN THE LAST THREE MONTHS: NO

## 2023-11-22 LAB
ALBUMIN SERPL-MCNC: 4.1 G/DL (ref 3.9–4.9)
ALBUMIN/GLOB SERPL: 1.6 {RATIO} (ref 1.2–2.2)
ALP SERPL-CCNC: 76 IU/L (ref 44–121)
ALT SERPL-CCNC: 25 IU/L (ref 0–44)
AST SERPL-CCNC: 32 IU/L (ref 0–40)
BILIRUB SERPL-MCNC: 0.2 MG/DL (ref 0–1.2)
BUN SERPL-MCNC: 9 MG/DL (ref 8–27)
BUN/CREAT SERPL: 10 (ref 10–24)
CALCIUM SERPL-MCNC: 8.9 MG/DL (ref 8.6–10.2)
CHLORIDE SERPL-SCNC: 99 MMOL/L (ref 96–106)
CHOLEST SERPL-MCNC: 165 MG/DL (ref 100–199)
CO2 SERPL-SCNC: 27 MMOL/L (ref 20–29)
CREAT SERPL-MCNC: 0.89 MG/DL (ref 0.76–1.27)
EGFRCR SERPLBLD CKD-EPI 2021: 95 ML/MIN/1.73
ERYTHROCYTE [DISTWIDTH] IN BLOOD BY AUTOMATED COUNT: 12.2 % (ref 11.6–15.4)
GLOBULIN SER CALC-MCNC: 2.5 G/DL (ref 1.5–4.5)
GLUCOSE SERPL-MCNC: 116 MG/DL (ref 70–99)
HCT VFR BLD AUTO: 38.2 % (ref 37.5–51)
HDLC SERPL-MCNC: 41 MG/DL
HGB BLD-MCNC: 13.3 G/DL (ref 13–17.7)
IMP & REVIEW OF LAB RESULTS: NORMAL
LDLC SERPL CALC-MCNC: 94 MG/DL (ref 0–99)
MCH RBC QN AUTO: 31.9 PG (ref 26.6–33)
MCHC RBC AUTO-ENTMCNC: 34.8 G/DL (ref 31.5–35.7)
MCV RBC AUTO: 92 FL (ref 79–97)
PLATELET # BLD AUTO: 249 X10E3/UL (ref 150–450)
POTASSIUM SERPL-SCNC: 3.4 MMOL/L (ref 3.5–5.2)
PROT SERPL-MCNC: 6.6 G/DL (ref 6–8.5)
RBC # BLD AUTO: 4.17 X10E6/UL (ref 4.14–5.8)
SODIUM SERPL-SCNC: 138 MMOL/L (ref 134–144)
TRIGL SERPL-MCNC: 171 MG/DL (ref 0–149)
VLDLC SERPL CALC-MCNC: 30 MG/DL (ref 5–40)
WBC # BLD AUTO: 7.2 X10E3/UL (ref 3.4–10.8)

## 2023-11-27 DIAGNOSIS — I10 PRIMARY HYPERTENSION: ICD-10-CM

## 2023-11-27 RX ORDER — AMLODIPINE BESYLATE 10 MG/1
10 TABLET ORAL DAILY
Qty: 90 TABLET | Refills: 1 | Status: SHIPPED | OUTPATIENT
Start: 2023-11-27

## 2024-01-02 DIAGNOSIS — E78.2 MIXED HYPERLIPIDEMIA: ICD-10-CM

## 2024-01-02 DIAGNOSIS — K21.9 GASTRO-ESOPHAGEAL REFLUX DISEASE WITHOUT ESOPHAGITIS: ICD-10-CM

## 2024-01-02 DIAGNOSIS — F31.70 BIPOLAR DISORDER, CURRENTLY IN REMISSION, MOST RECENT EPISODE UNSPECIFIED (HCC): ICD-10-CM

## 2024-01-02 DIAGNOSIS — I10 PRIMARY HYPERTENSION: ICD-10-CM

## 2024-01-02 DIAGNOSIS — I10 ESSENTIAL (PRIMARY) HYPERTENSION: ICD-10-CM

## 2024-01-02 RX ORDER — METOPROLOL SUCCINATE 50 MG/1
50 TABLET, EXTENDED RELEASE ORAL NIGHTLY
Qty: 31 TABLET | Refills: 12 | Status: SHIPPED | OUTPATIENT
Start: 2024-01-02

## 2024-01-02 RX ORDER — INDAPAMIDE 1.25 MG/1
1.25 TABLET ORAL DAILY
Qty: 31 TABLET | Refills: 12 | Status: SHIPPED | OUTPATIENT
Start: 2024-01-02

## 2024-01-02 RX ORDER — ATORVASTATIN CALCIUM 10 MG/1
TABLET, FILM COATED ORAL
Qty: 31 TABLET | Refills: 12 | Status: SHIPPED | OUTPATIENT
Start: 2024-01-02

## 2024-01-02 RX ORDER — HYDRALAZINE HYDROCHLORIDE 100 MG/1
TABLET, FILM COATED ORAL
Qty: 93 TABLET | Refills: 12 | Status: SHIPPED | OUTPATIENT
Start: 2024-01-02

## 2024-01-02 RX ORDER — CETIRIZINE HYDROCHLORIDE 10 MG/1
10 TABLET ORAL DAILY
Qty: 31 TABLET | Refills: 12 | Status: SHIPPED | OUTPATIENT
Start: 2024-01-02

## 2024-01-02 RX ORDER — LISINOPRIL 40 MG/1
40 TABLET ORAL DAILY
Qty: 31 TABLET | Refills: 12 | Status: SHIPPED | OUTPATIENT
Start: 2024-01-02

## 2024-01-02 RX ORDER — OMEPRAZOLE 20 MG/1
20 CAPSULE, DELAYED RELEASE ORAL DAILY
Qty: 31 CAPSULE | Refills: 12 | Status: SHIPPED | OUTPATIENT
Start: 2024-01-02

## 2024-01-08 DIAGNOSIS — E78.2 MIXED HYPERLIPIDEMIA: ICD-10-CM

## 2024-01-08 RX ORDER — MULTIVITAMIN
1 TABLET ORAL DAILY
Qty: 31 TABLET | Refills: 12 | Status: SHIPPED | OUTPATIENT
Start: 2024-01-08

## 2024-03-11 DIAGNOSIS — H40.9 GLAUCOMA OF BOTH EYES, UNSPECIFIED GLAUCOMA TYPE: ICD-10-CM

## 2024-03-11 RX ORDER — LATANOPROST 50 UG/ML
SOLUTION/ DROPS OPHTHALMIC
Qty: 2.5 ML | Refills: 12 | Status: SHIPPED | OUTPATIENT
Start: 2024-03-11

## 2024-05-22 ENCOUNTER — OFFICE VISIT (OUTPATIENT)
Age: 64
End: 2024-05-22
Payer: MEDICARE

## 2024-05-22 VITALS
HEART RATE: 60 BPM | DIASTOLIC BLOOD PRESSURE: 64 MMHG | OXYGEN SATURATION: 94 % | WEIGHT: 207.4 LBS | SYSTOLIC BLOOD PRESSURE: 98 MMHG | BODY MASS INDEX: 29.69 KG/M2 | HEIGHT: 70 IN

## 2024-05-22 DIAGNOSIS — G40.909 NONINTRACTABLE EPILEPSY WITHOUT STATUS EPILEPTICUS, UNSPECIFIED EPILEPSY TYPE (HCC): ICD-10-CM

## 2024-05-22 DIAGNOSIS — E78.2 MIXED HYPERLIPIDEMIA: ICD-10-CM

## 2024-05-22 DIAGNOSIS — I10 ESSENTIAL (PRIMARY) HYPERTENSION: Primary | ICD-10-CM

## 2024-05-22 DIAGNOSIS — F31.70 BIPOLAR DISORDER, CURRENTLY IN REMISSION, MOST RECENT EPISODE UNSPECIFIED (HCC): ICD-10-CM

## 2024-05-22 DIAGNOSIS — H40.9 GLAUCOMA OF BOTH EYES, UNSPECIFIED GLAUCOMA TYPE: ICD-10-CM

## 2024-05-22 PROCEDURE — 1123F ACP DISCUSS/DSCN MKR DOCD: CPT | Performed by: INTERNAL MEDICINE

## 2024-05-22 PROCEDURE — G8427 DOCREV CUR MEDS BY ELIG CLIN: HCPCS | Performed by: INTERNAL MEDICINE

## 2024-05-22 PROCEDURE — 99214 OFFICE O/P EST MOD 30 MIN: CPT | Performed by: INTERNAL MEDICINE

## 2024-05-22 PROCEDURE — 3017F COLORECTAL CA SCREEN DOC REV: CPT | Performed by: INTERNAL MEDICINE

## 2024-05-22 PROCEDURE — 1036F TOBACCO NON-USER: CPT | Performed by: INTERNAL MEDICINE

## 2024-05-22 PROCEDURE — 3078F DIAST BP <80 MM HG: CPT | Performed by: INTERNAL MEDICINE

## 2024-05-22 PROCEDURE — G8419 CALC BMI OUT NRM PARAM NOF/U: HCPCS | Performed by: INTERNAL MEDICINE

## 2024-05-22 PROCEDURE — 3074F SYST BP LT 130 MM HG: CPT | Performed by: INTERNAL MEDICINE

## 2024-05-22 RX ORDER — POTASSIUM CHLORIDE 750 MG/1
10 TABLET, FILM COATED, EXTENDED RELEASE ORAL DAILY
Qty: 90 TABLET | Refills: 1 | Status: SHIPPED | OUTPATIENT
Start: 2024-05-22

## 2024-05-22 ASSESSMENT — PATIENT HEALTH QUESTIONNAIRE - PHQ9
6. FEELING BAD ABOUT YOURSELF - OR THAT YOU ARE A FAILURE OR HAVE LET YOURSELF OR YOUR FAMILY DOWN: NOT AT ALL
SUM OF ALL RESPONSES TO PHQ QUESTIONS 1-9: 0
SUM OF ALL RESPONSES TO PHQ QUESTIONS 1-9: 0
9. THOUGHTS THAT YOU WOULD BE BETTER OFF DEAD, OR OF HURTING YOURSELF: NOT AT ALL
10. IF YOU CHECKED OFF ANY PROBLEMS, HOW DIFFICULT HAVE THESE PROBLEMS MADE IT FOR YOU TO DO YOUR WORK, TAKE CARE OF THINGS AT HOME, OR GET ALONG WITH OTHER PEOPLE: NOT DIFFICULT AT ALL
7. TROUBLE CONCENTRATING ON THINGS, SUCH AS READING THE NEWSPAPER OR WATCHING TELEVISION: NOT AT ALL
4. FEELING TIRED OR HAVING LITTLE ENERGY: NOT AT ALL
2. FEELING DOWN, DEPRESSED OR HOPELESS: NOT AT ALL
1. LITTLE INTEREST OR PLEASURE IN DOING THINGS: NOT AT ALL
SUM OF ALL RESPONSES TO PHQ QUESTIONS 1-9: 0
5. POOR APPETITE OR OVEREATING: NOT AT ALL
3. TROUBLE FALLING OR STAYING ASLEEP: NOT AT ALL
SUM OF ALL RESPONSES TO PHQ9 QUESTIONS 1 & 2: 0
SUM OF ALL RESPONSES TO PHQ QUESTIONS 1-9: 0
8. MOVING OR SPEAKING SO SLOWLY THAT OTHER PEOPLE COULD HAVE NOTICED. OR THE OPPOSITE, BEING SO FIGETY OR RESTLESS THAT YOU HAVE BEEN MOVING AROUND A LOT MORE THAN USUAL: NOT AT ALL

## 2024-05-29 ASSESSMENT — ENCOUNTER SYMPTOMS
ABDOMINAL PAIN: 0
EYES NEGATIVE: 1
RESPIRATORY NEGATIVE: 1
ALLERGIC/IMMUNOLOGIC NEGATIVE: 1
SHORTNESS OF BREATH: 0
GASTROINTESTINAL NEGATIVE: 1

## 2024-05-29 NOTE — PROGRESS NOTES
Chief Complaint   Patient presents with    Follow-up    Hypertension     \"Have you been to the ER, urgent care clinic since your last visit?  Hospitalized since your last visit?\"    NO    “Have you seen or consulted any other health care providers outside of [facility] since your last visit?”    NO            Click Here for Release of Records Request    
lower leg: No edema.      Left lower leg: No edema.   Skin:     General: Skin is warm and dry.      Findings: No rash.   Neurological:      General: No focal deficit present.      Mental Status: He is alert and oriented to person, place, and time. Mental status is at baseline.      Gait: Gait normal.   Psychiatric:         Mood and Affect: Mood normal.         Behavior: Behavior normal.            Assessment & Plan   Diagnosis Orders   1. Essential (primary) hypertension  Monitor BP at home with goal of 140/90 or less   Stable chronic condition.  Continue current treatment/medications.        2. Bipolar disorder, currently in remission, most recent episode unspecified (HCC)  Stable on current medication/regimen  Followed by psychiatrist      3. Nonintractable epilepsy without status epilepticus, unspecified epilepsy type (HCC)  No recent seizures      4. Mixed hyperlipidemia  Stable on current medication/regimen        5. Glaucoma of both eyes, unspecified glaucoma type  Stable on current medication/regimen  Followed by eye doctor                  Orders Placed This Encounter    potassium chloride (KLOR-CON) 10 MEQ extended release tablet     Sig: Take 1 tablet by mouth daily     Dispense:  90 tablet     Refill:  1        Return in about 6 months (around 11/22/2024).       Continue with current medical management and plan  lab results and schedule of future lab studies reviewed with patient  reviewed diet, exercise and weight control  reviewed medications and side effects in detail  F/u with other MD's/ providers as scheduled  COVID-19 precautions discussed with pt  An After Visit Summary was printed and given to the patient.      Aj Brock DO

## 2024-06-29 DIAGNOSIS — I10 ESSENTIAL (PRIMARY) HYPERTENSION: ICD-10-CM

## 2024-07-01 RX ORDER — CLONIDINE HYDROCHLORIDE 0.3 MG/1
TABLET ORAL
Qty: 60 TABLET | Refills: 2 | Status: SHIPPED | OUTPATIENT
Start: 2024-07-01

## 2024-07-10 DIAGNOSIS — H40.9 GLAUCOMA OF BOTH EYES, UNSPECIFIED GLAUCOMA TYPE: ICD-10-CM

## 2024-07-10 RX ORDER — CYCLOSPORINE 0.5 MG/ML
EMULSION OPHTHALMIC
Qty: 60 EACH | Refills: 11 | Status: SHIPPED | OUTPATIENT
Start: 2024-07-10

## 2024-07-15 ENCOUNTER — CLINICAL DOCUMENTATION (OUTPATIENT)
Age: 64
End: 2024-07-15

## 2024-07-15 NOTE — PROGRESS NOTES
Pt spoke to the ECC and complains of trouble breathing. ECC called the office and stated call was dropped by the patient. I called all 3 numbers in patients chart - none of them rang, unable to leave voice message, phone # disconnected or not in service.

## 2024-10-01 DIAGNOSIS — I10 ESSENTIAL (PRIMARY) HYPERTENSION: ICD-10-CM

## 2024-10-01 RX ORDER — CLONIDINE HYDROCHLORIDE 0.3 MG/1
TABLET ORAL
Qty: 60 TABLET | Refills: 2 | Status: SHIPPED | OUTPATIENT
Start: 2024-10-01

## 2024-10-30 DIAGNOSIS — I10 PRIMARY HYPERTENSION: ICD-10-CM

## 2024-10-30 RX ORDER — AMLODIPINE BESYLATE 10 MG/1
10 TABLET ORAL DAILY
Qty: 30 TABLET | Refills: 0 | Status: SHIPPED | OUTPATIENT
Start: 2024-10-30

## 2024-11-22 RX ORDER — AZELASTINE HYDROCHLORIDE 0.5 MG/ML
1 SOLUTION/ DROPS OPHTHALMIC 2 TIMES DAILY
Qty: 6 ML | Refills: 0 | Status: SHIPPED | OUTPATIENT
Start: 2024-11-22 | End: 2024-12-22

## 2024-11-22 NOTE — TELEPHONE ENCOUNTER
LOV: 05/22/2024  NOV: None    Medication: azelastine (OPTIVAR) 0.05 % ophthalmic solution   Quantity: ?    Pharmacy: [facility] - [place], [place] - [address] [place] [address] [phone] -  [phone] [[id number]]

## 2024-11-26 ENCOUNTER — TELEPHONE (OUTPATIENT)
Age: 64
End: 2024-11-26

## 2024-11-26 NOTE — TELEPHONE ENCOUNTER
----- Message from Tala PIERSON sent at 11/26/2024 10:01 AM EST -----  Regarding: ECC Appointment Request  ECC Appointment Request    Patient needs appointment for ECC Appointment Type: Annual Visit.    Patient Requested Dates(s): Jan 6 - 10, 2025  Patient Requested Time: mid morning around 10 AM   Provider Name:Patient's provider     Reason for Appointment Request: Established Patient - No appointments available during search  --------------------------------------------------------------------------------------------------------------------------    Relationship to Patient: Covered Entity Caitlin - [employer]      Call Back Information: OK to leave message on voicemail  Preferred Call Back Number: Phone [phone]

## 2024-11-29 DIAGNOSIS — I10 PRIMARY HYPERTENSION: ICD-10-CM

## 2024-11-29 RX ORDER — AMLODIPINE BESYLATE 10 MG/1
10 TABLET ORAL DAILY
Qty: 30 TABLET | Refills: 12 | Status: SHIPPED | OUTPATIENT
Start: 2024-11-29

## 2024-11-29 NOTE — TELEPHONE ENCOUNTER
Last appt 5/22/2024      Next Apt:     Future Appointments   Date Time Provider Department Center   1/27/2025  2:00 PM Foreign Lua MD NEUSMPBB BS [facility] - [place], [place] - [address] [facility] - P [phone] - F [phone]  [address] [facility] [address]  Phone: [phone] Fax: [phone]

## 2024-12-16 ENCOUNTER — TELEPHONE (OUTPATIENT)
Age: 64
End: 2024-12-16

## 2024-12-16 RX ORDER — AZELASTINE HYDROCHLORIDE 0.5 MG/ML
SOLUTION/ DROPS OPHTHALMIC
Qty: 6 ML | Refills: 12 | OUTPATIENT
Start: 2024-12-16

## 2024-12-27 RX ORDER — AZELASTINE HYDROCHLORIDE 0.5 MG/ML
SOLUTION/ DROPS OPHTHALMIC
Qty: 6 ML | Refills: 12 | Status: SHIPPED | OUTPATIENT
Start: 2024-12-27

## 2024-12-27 NOTE — TELEPHONE ENCOUNTER
Last appt 5/22/2024      Next Apt:     Future Appointments   Date Time Provider Department Center   1/27/2025  2:00 PM Foreign Lua MD NEUSMPBB BS [facility] - [place], [place] - [address] [facility] - P [phone] - F [phone]  [address] [facility] [address]  Phone: [phone] Fax: [phone]

## 2024-12-30 DIAGNOSIS — E78.2 MIXED HYPERLIPIDEMIA: ICD-10-CM

## 2024-12-30 DIAGNOSIS — K21.9 GASTRO-ESOPHAGEAL REFLUX DISEASE WITHOUT ESOPHAGITIS: ICD-10-CM

## 2024-12-30 DIAGNOSIS — I10 PRIMARY HYPERTENSION: ICD-10-CM

## 2024-12-30 DIAGNOSIS — I10 ESSENTIAL (PRIMARY) HYPERTENSION: ICD-10-CM

## 2024-12-30 DIAGNOSIS — F31.70 BIPOLAR DISORDER, CURRENTLY IN REMISSION, MOST RECENT EPISODE UNSPECIFIED (HCC): ICD-10-CM

## 2024-12-31 RX ORDER — ATORVASTATIN CALCIUM 10 MG/1
TABLET, FILM COATED ORAL
Qty: 90 TABLET | Refills: 1 | OUTPATIENT
Start: 2024-12-31

## 2024-12-31 RX ORDER — LISINOPRIL 40 MG/1
40 TABLET ORAL DAILY
Qty: 30 TABLET | Refills: 12 | OUTPATIENT
Start: 2024-12-31

## 2024-12-31 RX ORDER — METOPROLOL SUCCINATE 50 MG/1
50 TABLET, EXTENDED RELEASE ORAL NIGHTLY
Qty: 30 TABLET | Refills: 12 | OUTPATIENT
Start: 2024-12-31

## 2024-12-31 RX ORDER — CETIRIZINE HYDROCHLORIDE 10 MG/1
10 TABLET ORAL DAILY
Qty: 90 TABLET | Refills: 1 | OUTPATIENT
Start: 2024-12-31

## 2024-12-31 RX ORDER — MULTIVITAMIN
1 TABLET ORAL DAILY
Qty: 30 TABLET | Refills: 12 | OUTPATIENT
Start: 2024-12-31

## 2024-12-31 RX ORDER — HYDRALAZINE HYDROCHLORIDE 100 MG/1
TABLET, FILM COATED ORAL
Qty: 90 TABLET | Refills: 12 | OUTPATIENT
Start: 2024-12-31

## 2024-12-31 RX ORDER — INDAPAMIDE 1.25 MG/1
1.25 TABLET ORAL DAILY
Qty: 30 TABLET | Refills: 12 | OUTPATIENT
Start: 2024-12-31

## 2025-01-16 DIAGNOSIS — K21.9 GASTRO-ESOPHAGEAL REFLUX DISEASE WITHOUT ESOPHAGITIS: ICD-10-CM

## 2025-01-16 DIAGNOSIS — I10 PRIMARY HYPERTENSION: ICD-10-CM

## 2025-01-16 DIAGNOSIS — E78.2 MIXED HYPERLIPIDEMIA: ICD-10-CM

## 2025-01-16 DIAGNOSIS — F31.70 BIPOLAR DISORDER, CURRENTLY IN REMISSION, MOST RECENT EPISODE UNSPECIFIED (HCC): ICD-10-CM

## 2025-01-16 DIAGNOSIS — I10 ESSENTIAL (PRIMARY) HYPERTENSION: ICD-10-CM

## 2025-01-17 RX ORDER — INDAPAMIDE 1.25 MG/1
1.25 TABLET ORAL DAILY
Qty: 31 TABLET | Refills: 12 | Status: SHIPPED | OUTPATIENT
Start: 2025-01-17

## 2025-01-17 RX ORDER — CETIRIZINE HYDROCHLORIDE 10 MG/1
10 TABLET ORAL DAILY
Qty: 31 TABLET | Refills: 12 | Status: SHIPPED | OUTPATIENT
Start: 2025-01-17

## 2025-01-17 RX ORDER — MULTIVITAMIN
1 TABLET ORAL DAILY
Qty: 31 TABLET | Refills: 12 | Status: SHIPPED | OUTPATIENT
Start: 2025-01-17

## 2025-01-17 RX ORDER — LISINOPRIL 40 MG/1
40 TABLET ORAL DAILY
Qty: 31 TABLET | Refills: 12 | Status: SHIPPED | OUTPATIENT
Start: 2025-01-17

## 2025-01-17 RX ORDER — HYDRALAZINE HYDROCHLORIDE 100 MG/1
TABLET, FILM COATED ORAL
Qty: 93 TABLET | Refills: 12 | Status: SHIPPED | OUTPATIENT
Start: 2025-01-17

## 2025-01-17 RX ORDER — ATORVASTATIN CALCIUM 10 MG/1
TABLET, FILM COATED ORAL
Qty: 31 TABLET | Refills: 12 | Status: SHIPPED | OUTPATIENT
Start: 2025-01-17

## 2025-01-27 ENCOUNTER — OFFICE VISIT (OUTPATIENT)
Age: 65
End: 2025-01-27
Payer: MEDICARE

## 2025-01-27 VITALS
OXYGEN SATURATION: 99 % | HEIGHT: 70 IN | SYSTOLIC BLOOD PRESSURE: 138 MMHG | HEART RATE: 77 BPM | BODY MASS INDEX: 31.92 KG/M2 | DIASTOLIC BLOOD PRESSURE: 78 MMHG | RESPIRATION RATE: 18 BRPM | WEIGHT: 223 LBS

## 2025-01-27 VITALS
BODY MASS INDEX: 31.35 KG/M2 | HEART RATE: 75 BPM | OXYGEN SATURATION: 95 % | HEIGHT: 70 IN | RESPIRATION RATE: 17 BRPM | WEIGHT: 219 LBS | DIASTOLIC BLOOD PRESSURE: 62 MMHG | SYSTOLIC BLOOD PRESSURE: 126 MMHG

## 2025-01-27 DIAGNOSIS — H61.23 BILATERAL IMPACTED CERUMEN: ICD-10-CM

## 2025-01-27 DIAGNOSIS — F79 INTELLECTUAL DISABILITY: ICD-10-CM

## 2025-01-27 DIAGNOSIS — R73.03 PREDIABETES: ICD-10-CM

## 2025-01-27 DIAGNOSIS — G40.909 NONINTRACTABLE EPILEPSY WITHOUT STATUS EPILEPTICUS, UNSPECIFIED EPILEPSY TYPE (HCC): Primary | ICD-10-CM

## 2025-01-27 DIAGNOSIS — Z12.5 SCREENING PSA (PROSTATE SPECIFIC ANTIGEN): ICD-10-CM

## 2025-01-27 DIAGNOSIS — Z00.00 MEDICARE ANNUAL WELLNESS VISIT, SUBSEQUENT: ICD-10-CM

## 2025-01-27 DIAGNOSIS — E78.2 MIXED HYPERLIPIDEMIA: ICD-10-CM

## 2025-01-27 DIAGNOSIS — G30.9 ALZHEIMER'S DISEASE, UNSPECIFIED (CODE) (HCC): ICD-10-CM

## 2025-01-27 DIAGNOSIS — I10 ESSENTIAL (PRIMARY) HYPERTENSION: ICD-10-CM

## 2025-01-27 DIAGNOSIS — G40.909 NONINTRACTABLE EPILEPSY WITHOUT STATUS EPILEPTICUS, UNSPECIFIED EPILEPSY TYPE (HCC): ICD-10-CM

## 2025-01-27 DIAGNOSIS — R90.89 ABNORMAL BRAIN MRI: ICD-10-CM

## 2025-01-27 DIAGNOSIS — Z00.00 MEDICARE ANNUAL WELLNESS VISIT, SUBSEQUENT: Primary | ICD-10-CM

## 2025-01-27 PROCEDURE — 1159F MED LIST DOCD IN RCRD: CPT | Performed by: PSYCHIATRY & NEUROLOGY

## 2025-01-27 PROCEDURE — G8417 CALC BMI ABV UP PARAM F/U: HCPCS | Performed by: PSYCHIATRY & NEUROLOGY

## 2025-01-27 PROCEDURE — 69209 REMOVE IMPACTED EAR WAX UNI: CPT | Performed by: INTERNAL MEDICINE

## 2025-01-27 PROCEDURE — 99204 OFFICE O/P NEW MOD 45 MIN: CPT | Performed by: PSYCHIATRY & NEUROLOGY

## 2025-01-27 PROCEDURE — 1123F ACP DISCUSS/DSCN MKR DOCD: CPT | Performed by: PSYCHIATRY & NEUROLOGY

## 2025-01-27 PROCEDURE — 1036F TOBACCO NON-USER: CPT | Performed by: PSYCHIATRY & NEUROLOGY

## 2025-01-27 PROCEDURE — 1126F AMNT PAIN NOTED NONE PRSNT: CPT | Performed by: INTERNAL MEDICINE

## 2025-01-27 PROCEDURE — 1159F MED LIST DOCD IN RCRD: CPT | Performed by: INTERNAL MEDICINE

## 2025-01-27 PROCEDURE — 3017F COLORECTAL CA SCREEN DOC REV: CPT | Performed by: PSYCHIATRY & NEUROLOGY

## 2025-01-27 PROCEDURE — G8427 DOCREV CUR MEDS BY ELIG CLIN: HCPCS | Performed by: PSYCHIATRY & NEUROLOGY

## 2025-01-27 PROCEDURE — 3017F COLORECTAL CA SCREEN DOC REV: CPT | Performed by: INTERNAL MEDICINE

## 2025-01-27 PROCEDURE — 3075F SYST BP GE 130 - 139MM HG: CPT | Performed by: PSYCHIATRY & NEUROLOGY

## 2025-01-27 PROCEDURE — PBSHW REMOVAL IMPACTED CERUMEN IRRIGATION/LVG UNILAT: Performed by: INTERNAL MEDICINE

## 2025-01-27 PROCEDURE — 1126F AMNT PAIN NOTED NONE PRSNT: CPT | Performed by: PSYCHIATRY & NEUROLOGY

## 2025-01-27 PROCEDURE — 3074F SYST BP LT 130 MM HG: CPT | Performed by: INTERNAL MEDICINE

## 2025-01-27 PROCEDURE — 3078F DIAST BP <80 MM HG: CPT | Performed by: PSYCHIATRY & NEUROLOGY

## 2025-01-27 PROCEDURE — 3078F DIAST BP <80 MM HG: CPT | Performed by: INTERNAL MEDICINE

## 2025-01-27 PROCEDURE — 1160F RVW MEDS BY RX/DR IN RCRD: CPT | Performed by: INTERNAL MEDICINE

## 2025-01-27 PROCEDURE — 1123F ACP DISCUSS/DSCN MKR DOCD: CPT | Performed by: INTERNAL MEDICINE

## 2025-01-27 PROCEDURE — G0439 PPPS, SUBSEQ VISIT: HCPCS | Performed by: INTERNAL MEDICINE

## 2025-01-27 RX ORDER — PHENOBARBITAL 15 MG/1
60 TABLET ORAL 2 TIMES DAILY
COMMUNITY

## 2025-01-27 RX ORDER — ANTIOX #8/OM3/DHA/EPA/LUT/ZEAX 250-2.5 MG
CAPSULE ORAL
COMMUNITY

## 2025-01-27 SDOH — ECONOMIC STABILITY: FOOD INSECURITY: WITHIN THE PAST 12 MONTHS, YOU WORRIED THAT YOUR FOOD WOULD RUN OUT BEFORE YOU GOT MONEY TO BUY MORE.: NEVER TRUE

## 2025-01-27 SDOH — ECONOMIC STABILITY: FOOD INSECURITY: WITHIN THE PAST 12 MONTHS, THE FOOD YOU BOUGHT JUST DIDN'T LAST AND YOU DIDN'T HAVE MONEY TO GET MORE.: NEVER TRUE

## 2025-01-27 ASSESSMENT — PATIENT HEALTH QUESTIONNAIRE - PHQ9
SUM OF ALL RESPONSES TO PHQ QUESTIONS 1-9: 0
SUM OF ALL RESPONSES TO PHQ QUESTIONS 1-9: 0
2. FEELING DOWN, DEPRESSED OR HOPELESS: NOT AT ALL
8. MOVING OR SPEAKING SO SLOWLY THAT OTHER PEOPLE COULD HAVE NOTICED. OR THE OPPOSITE, BEING SO FIGETY OR RESTLESS THAT YOU HAVE BEEN MOVING AROUND A LOT MORE THAN USUAL: NOT AT ALL
4. FEELING TIRED OR HAVING LITTLE ENERGY: NOT AT ALL
7. TROUBLE CONCENTRATING ON THINGS, SUCH AS READING THE NEWSPAPER OR WATCHING TELEVISION: NOT AT ALL
10. IF YOU CHECKED OFF ANY PROBLEMS, HOW DIFFICULT HAVE THESE PROBLEMS MADE IT FOR YOU TO DO YOUR WORK, TAKE CARE OF THINGS AT HOME, OR GET ALONG WITH OTHER PEOPLE: NOT DIFFICULT AT ALL
SUM OF ALL RESPONSES TO PHQ9 QUESTIONS 1 & 2: 0
SUM OF ALL RESPONSES TO PHQ QUESTIONS 1-9: 0
9. THOUGHTS THAT YOU WOULD BE BETTER OFF DEAD, OR OF HURTING YOURSELF: NOT AT ALL
5. POOR APPETITE OR OVEREATING: NOT AT ALL
1. LITTLE INTEREST OR PLEASURE IN DOING THINGS: NOT AT ALL
3. TROUBLE FALLING OR STAYING ASLEEP: NOT AT ALL
SUM OF ALL RESPONSES TO PHQ QUESTIONS 1-9: 0
6. FEELING BAD ABOUT YOURSELF - OR THAT YOU ARE A FAILURE OR HAVE LET YOURSELF OR YOUR FAMILY DOWN: NOT AT ALL

## 2025-01-27 ASSESSMENT — LIFESTYLE VARIABLES: HOW MANY STANDARD DRINKS CONTAINING ALCOHOL DO YOU HAVE ON A TYPICAL DAY: PATIENT DOES NOT DRINK

## 2025-01-27 NOTE — PROGRESS NOTES
Medicare Annual Wellness Visit    Zac Garcia is here for Medicare AWV    Assessment & Plan   Assessment & Plan  1. Medicare wellness visit.  His blood pressure readings are within the normal range at 126/62. He has a past medical history of prediabetes. He is on Namenda for memory. A comprehensive blood work panel will be ordered to update his health status. He is advised to report any recurrent episodes of vomiting postprandial for further evaluation.    2. Cerumen impaction.  Both ears are significantly occluded with cerumen. He is cautioned against the use of Q-tips for ear cleaning due to the risk of tympanic membrane perforation. He is advised to use Debrox every 2 weeks to prevent cerumen accumulation. An ear irrigation procedure will be performed today to remove the impacted cerumen.    3. Epilepsy.  He has a history of seizures and is scheduled for a neurology appointment today.    Follow-up  The patient will follow up in 6 months.    Medicare annual wellness visit, subsequent  -     Comprehensive Metabolic Panel; Future  -     CBC with Auto Differential; Future  Alzheimer's disease, unspecified (CODE) (HCC)  Nonintractable epilepsy without status epilepticus, unspecified epilepsy type (HCC)  Essential (primary) hypertension  Mixed hyperlipidemia  -     Lipid Panel; Future  Prediabetes  -     Hemoglobin A1C; Future  Screening PSA (prostate specific antigen)  -     PSA Screening; Future  Bilateral impacted cerumen  -     REMOVAL IMPACTED CERUMEN IRRIGATION/LVG UNILAT    Reviewed with patient medication side effects in detail   I answered all patient questions and concerns  Labs and testing done and/or upcoming labs/test were reviewed and discussed   Reviewed and discussed daily activity, exercise and diet    Recommendations for Preventive Services Due: see orders and patient instructions/AVS.  Recommended screening schedule for the next 5-10 years is provided to the patient in written form: see Patient

## 2025-01-27 NOTE — PROGRESS NOTES
the  posterior body and splenium. The genu of the corpus callosum is also small.  There is mild nonspecific T2/FLAIR white matter hyperintensity in the  periventricular and deep white matter.    Small left hippocampus with associated FLAIR hyperintensity (series 3 image 19).    No hydrocephalus. No evidence of acute infarct. No definite chronic  microhemorrhages are identified. There is no cerebellar tonsillar herniation.  Expected arterial flow-voids are present. No evidence of abnormal enhancement.    The paranasal sinuses, mastoid air cells, and middle ears are clear. The orbital  contents are within normal limits. No significant osseous or scalp lesions are  identified.    Impression  IMPRESSION:    1. 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  2. Small left hippocampus with associated signal abnormality, favored to  represent left mesial temporal sclerosis.  3. No acute intracranial abnormality.    23X    Imaging reviewed    Lab Results   Component Value Date    WBC 7.2 11/21/2023    HGB 13.3 11/21/2023    HCT 38.2 11/21/2023    MCV 92 11/21/2023     11/21/2023     Lab Results   Component Value Date     11/21/2023    K 3.4 (L) 11/21/2023    CL 99 11/21/2023    CO2 27 11/21/2023    BUN 9 11/21/2023    CREATININE 0.89 11/21/2023    GLUCOSE 116 (H) 11/21/2023    CALCIUM 8.9 11/21/2023    BILITOT 0.2 11/21/2023    ALKPHOS 76 11/21/2023    AST 32 11/21/2023    ALT 25 11/21/2023    LABGLOM 95 11/21/2023    GFRAA 84 06/14/2021    AGRATIO 1.6 11/21/2023       ASSESSMENT   Diagnosis Orders   1. Nonintractable epilepsy without status epilepticus, unspecified epilepsy type (HCC)  Phenobarbital Level      2. Intellectual disability        3. Abnormal brain MRI            DISCUSSION  Mr. Zac Garcia with neurodevelopmental disorder with intellectual disability and imaging abnormalities including dysgenesis of the corpus callosum and left

## 2025-01-28 LAB
ALBUMIN SERPL-MCNC: 4.1 G/DL (ref 3.9–4.9)
ALP SERPL-CCNC: 83 IU/L (ref 44–121)
ALT SERPL-CCNC: 25 IU/L (ref 0–44)
AST SERPL-CCNC: 25 IU/L (ref 0–40)
BASOPHILS # BLD AUTO: 0.1 X10E3/UL (ref 0–0.2)
BASOPHILS NFR BLD AUTO: 1 %
BILIRUB SERPL-MCNC: 0.2 MG/DL (ref 0–1.2)
BUN SERPL-MCNC: 10 MG/DL (ref 8–27)
BUN/CREAT SERPL: 9 (ref 10–24)
CALCIUM SERPL-MCNC: 9 MG/DL (ref 8.6–10.2)
CHLORIDE SERPL-SCNC: 99 MMOL/L (ref 96–106)
CHOLEST SERPL-MCNC: 172 MG/DL (ref 100–199)
CO2 SERPL-SCNC: 25 MMOL/L (ref 20–29)
CREAT SERPL-MCNC: 1.08 MG/DL (ref 0.76–1.27)
EGFRCR SERPLBLD CKD-EPI 2021: 76 ML/MIN/1.73
EOSINOPHIL # BLD AUTO: 0.1 X10E3/UL (ref 0–0.4)
EOSINOPHIL NFR BLD AUTO: 1 %
ERYTHROCYTE [DISTWIDTH] IN BLOOD BY AUTOMATED COUNT: 12 % (ref 11.6–15.4)
GLOBULIN SER CALC-MCNC: 2.6 G/DL (ref 1.5–4.5)
GLUCOSE SERPL-MCNC: 121 MG/DL (ref 70–99)
HBA1C MFR BLD: 5.5 % (ref 4.8–5.6)
HCT VFR BLD AUTO: 38.8 % (ref 37.5–51)
HDLC SERPL-MCNC: 43 MG/DL
HGB BLD-MCNC: 13.3 G/DL (ref 13–17.7)
IMM GRANULOCYTES # BLD AUTO: 0 X10E3/UL (ref 0–0.1)
IMM GRANULOCYTES NFR BLD AUTO: 0 %
IMP & REVIEW OF LAB RESULTS: NORMAL
LDLC SERPL CALC-MCNC: 96 MG/DL (ref 0–99)
LYMPHOCYTES # BLD AUTO: 2.6 X10E3/UL (ref 0.7–3.1)
LYMPHOCYTES NFR BLD AUTO: 36 %
MCH RBC QN AUTO: 32 PG (ref 26.6–33)
MCHC RBC AUTO-ENTMCNC: 34.3 G/DL (ref 31.5–35.7)
MCV RBC AUTO: 94 FL (ref 79–97)
MONOCYTES # BLD AUTO: 0.9 X10E3/UL (ref 0.1–0.9)
MONOCYTES NFR BLD AUTO: 12 %
NEUTROPHILS # BLD AUTO: 3.5 X10E3/UL (ref 1.4–7)
NEUTROPHILS NFR BLD AUTO: 50 %
PHENOBARB SERPL-MCNC: 19 UG/ML (ref 15–40)
PLATELET # BLD AUTO: 257 X10E3/UL (ref 150–450)
POTASSIUM SERPL-SCNC: 3.4 MMOL/L (ref 3.5–5.2)
PROT SERPL-MCNC: 6.7 G/DL (ref 6–8.5)
PSA SERPL-MCNC: 10.3 NG/ML (ref 0–4)
RBC # BLD AUTO: 4.15 X10E6/UL (ref 4.14–5.8)
SODIUM SERPL-SCNC: 137 MMOL/L (ref 134–144)
TRIGL SERPL-MCNC: 192 MG/DL (ref 0–149)
VLDLC SERPL CALC-MCNC: 33 MG/DL (ref 5–40)
WBC # BLD AUTO: 7.1 X10E3/UL (ref 3.4–10.8)

## 2025-01-29 ENCOUNTER — TELEPHONE (OUTPATIENT)
Age: 65
End: 2025-01-29

## 2025-01-29 DIAGNOSIS — I10 ESSENTIAL (PRIMARY) HYPERTENSION: ICD-10-CM

## 2025-01-29 DIAGNOSIS — R97.20 ELEVATED PSA: Primary | ICD-10-CM

## 2025-01-29 RX ORDER — CLONIDINE HYDROCHLORIDE 0.3 MG/1
TABLET ORAL
Qty: 60 TABLET | Refills: 6 | Status: SHIPPED | OUTPATIENT
Start: 2025-01-29

## 2025-01-29 NOTE — TELEPHONE ENCOUNTER
Spoke with Betina Brewer CM. She verbalized understanding.    ----- Message from GEORGIE Stewart NP sent at 1/28/2025  8:29 PM EST -----  Call  or group home.     Patients PSA is significantly elevated. Refer to urology     Triglycerides are elevated. Please be sure patient is eating a Mediterranean like diet     Be sure we are getting in fruits and vegetables daily

## 2025-02-07 DIAGNOSIS — H40.9 GLAUCOMA OF BOTH EYES, UNSPECIFIED GLAUCOMA TYPE: ICD-10-CM

## 2025-02-07 RX ORDER — AZELASTINE HYDROCHLORIDE 0.5 MG/ML
1 SOLUTION/ DROPS OPHTHALMIC DAILY
Qty: 6 ML | Refills: 1 | Status: SHIPPED | OUTPATIENT
Start: 2025-02-07

## 2025-02-07 RX ORDER — CYCLOSPORINE 0.5 MG/ML
1 EMULSION OPHTHALMIC EVERY 12 HOURS
Qty: 60 EACH | Refills: 1 | Status: SHIPPED | OUTPATIENT
Start: 2025-02-07

## 2025-02-19 ENCOUNTER — TELEPHONE (OUTPATIENT)
Age: 65
End: 2025-02-19

## 2025-05-05 DIAGNOSIS — H40.9 GLAUCOMA OF BOTH EYES, UNSPECIFIED GLAUCOMA TYPE: ICD-10-CM

## 2025-05-05 RX ORDER — CYCLOSPORINE 0.5 MG/ML
EMULSION OPHTHALMIC
Qty: 60 EACH | Refills: 3 | Status: SHIPPED | OUTPATIENT
Start: 2025-05-05

## 2025-06-17 RX ORDER — POTASSIUM CHLORIDE 750 MG/1
10 TABLET, EXTENDED RELEASE ORAL DAILY
Qty: 90 TABLET | Refills: 0 | Status: SHIPPED | OUTPATIENT
Start: 2025-06-17

## 2025-06-23 ENCOUNTER — TELEPHONE (OUTPATIENT)
Age: 65
End: 2025-06-23

## 2025-06-23 RX ORDER — ANTIOX #8/OM3/DHA/EPA/LUT/ZEAX 250-2.5 MG
1 CAPSULE ORAL DAILY
Qty: 90 CAPSULE | Refills: 0 | Status: SHIPPED | OUTPATIENT
Start: 2025-06-23

## 2025-06-23 NOTE — TELEPHONE ENCOUNTER
LOV: 01/27/2025  NOV: 07/28/2025    Medication: Multiple Vitamins-Minerals (PRESERVISION AREDS 2) CAPS   Quantity: ?    Pharmacy: [facility] - [place], [place] - [address] [place] [address] -  [phone] -  [phone] [[id number]]

## 2025-07-28 ENCOUNTER — OFFICE VISIT (OUTPATIENT)
Age: 65
End: 2025-07-28
Payer: MEDICARE

## 2025-07-28 VITALS
HEIGHT: 70 IN | SYSTOLIC BLOOD PRESSURE: 130 MMHG | DIASTOLIC BLOOD PRESSURE: 86 MMHG | WEIGHT: 223.4 LBS | RESPIRATION RATE: 20 BRPM | BODY MASS INDEX: 31.98 KG/M2 | HEART RATE: 76 BPM | OXYGEN SATURATION: 94 %

## 2025-07-28 DIAGNOSIS — I10 ESSENTIAL (PRIMARY) HYPERTENSION: ICD-10-CM

## 2025-07-28 DIAGNOSIS — G89.29 CHRONIC LEFT-SIDED LOW BACK PAIN WITHOUT SCIATICA: Primary | ICD-10-CM

## 2025-07-28 DIAGNOSIS — R06.02 SOB (SHORTNESS OF BREATH): ICD-10-CM

## 2025-07-28 DIAGNOSIS — R97.20 ELEVATED PSA: ICD-10-CM

## 2025-07-28 DIAGNOSIS — E66.811 OBESITY (BMI 30.0-34.9): ICD-10-CM

## 2025-07-28 DIAGNOSIS — M54.50 CHRONIC LEFT-SIDED LOW BACK PAIN WITHOUT SCIATICA: Primary | ICD-10-CM

## 2025-07-28 DIAGNOSIS — R06.83 SNORES: ICD-10-CM

## 2025-07-28 PROCEDURE — G8427 DOCREV CUR MEDS BY ELIG CLIN: HCPCS | Performed by: INTERNAL MEDICINE

## 2025-07-28 PROCEDURE — 1160F RVW MEDS BY RX/DR IN RCRD: CPT | Performed by: INTERNAL MEDICINE

## 2025-07-28 PROCEDURE — 3075F SYST BP GE 130 - 139MM HG: CPT | Performed by: INTERNAL MEDICINE

## 2025-07-28 PROCEDURE — 99214 OFFICE O/P EST MOD 30 MIN: CPT | Performed by: INTERNAL MEDICINE

## 2025-07-28 PROCEDURE — 3079F DIAST BP 80-89 MM HG: CPT | Performed by: INTERNAL MEDICINE

## 2025-07-28 PROCEDURE — 1159F MED LIST DOCD IN RCRD: CPT | Performed by: INTERNAL MEDICINE

## 2025-07-28 PROCEDURE — G8417 CALC BMI ABV UP PARAM F/U: HCPCS | Performed by: INTERNAL MEDICINE

## 2025-07-28 PROCEDURE — 1125F AMNT PAIN NOTED PAIN PRSNT: CPT | Performed by: INTERNAL MEDICINE

## 2025-07-28 PROCEDURE — 3017F COLORECTAL CA SCREEN DOC REV: CPT | Performed by: INTERNAL MEDICINE

## 2025-07-28 PROCEDURE — 1123F ACP DISCUSS/DSCN MKR DOCD: CPT | Performed by: INTERNAL MEDICINE

## 2025-07-28 PROCEDURE — 1036F TOBACCO NON-USER: CPT | Performed by: INTERNAL MEDICINE

## 2025-07-28 RX ORDER — CROMOLYN SODIUM 40 MG/ML
SOLUTION/ DROPS OPHTHALMIC
COMMUNITY
Start: 2025-05-27

## 2025-07-28 RX ORDER — LIDOCAINE 50 MG/G
1 PATCH TOPICAL DAILY
Qty: 10 PATCH | Refills: 0 | Status: SHIPPED | OUTPATIENT
Start: 2025-07-28 | End: 2025-08-07

## 2025-07-28 RX ORDER — CHLORHEXIDINE GLUCONATE ORAL RINSE 1.2 MG/ML
SOLUTION DENTAL
COMMUNITY
Start: 2025-07-05

## 2025-07-28 ASSESSMENT — ENCOUNTER SYMPTOMS
BACK PAIN: 1
GASTROINTESTINAL NEGATIVE: 1
SHORTNESS OF BREATH: 1

## 2025-07-28 NOTE — PROGRESS NOTES
Subjective    Zac Garcia is a 64 y.o. male who presents today for the following:  Chief Complaint   Patient presents with    Follow-up       History of Present Illness  The patient presents for a 6-month follow-up. Here with his counselor     He reports experiencing cramps on his left side, which he attributes to prolonged standing at his job. He works from Monday to Friday, 8:00 AM to 4:00 PM, with a one-hour lunch break. He has not taken any medication for these cramps and has not used a lidocaine patch. He also mentions pain in his lower back that intensifies upon pressure but does not radiate down his leg. He has not used a lidocaine patch.    He experiences excessive sleepiness and heavy breathing, particularly during sleep, which has been a concern for some time. He is unsure if he needs a sleep test. He gets out of breath quickly and has been coughing at night. He has been told that he snores. He has never had a sleep study test. He is unsure if he stops breathing while sleeping as he does not work overnight. He feels like he can get a full breath right now and is not having trouble breathing. He does not have chest tightness. He has no history of asthma or smoking. He experiences nasal congestion at night, which sometimes leads to nosebleeds.    He reports no headaches and confirms that he is taking his blood pressure medications as prescribed, typically at night.      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     reports that he has never smoked. He has never used smokeless tobacco.    reports no history of alcohol use.   Results  Labs   - PSA: 01/2025, Elevated   - Triglycerides: 01/2025, Elevated     Vitals:    07/28/25 1641   BP: 130/86

## 2025-07-28 NOTE — PROGRESS NOTES
Chief Complaint   Patient presents with    Follow-up    Cramp on left lower back when walking   Have you been to the ER, urgent care clinic since your last visit?  Hospitalized since your last visit?   NO    Have you seen or consulted any other health care providers outside our system since your last visit?   NO

## 2025-07-29 ENCOUNTER — TELEPHONE (OUTPATIENT)
Age: 65
End: 2025-07-29

## 2025-07-29 NOTE — TELEPHONE ENCOUNTER
lidocaine (LIDODERM) 5 % requires a Prior Auth; If insurance doesn't cover Prescription, Pt would like an alternate medication sent in

## 2025-07-30 ENCOUNTER — TELEPHONE (OUTPATIENT)
Age: 65
End: 2025-07-30

## 2025-07-30 NOTE — TELEPHONE ENCOUNTER
Please rewrite referral to see a more local provider.     Please return call once new one is placed.     Phone: [phone]

## 2025-08-05 ENCOUNTER — TELEPHONE (OUTPATIENT)
Age: 65
End: 2025-08-05

## 2025-08-15 ENCOUNTER — TELEPHONE (OUTPATIENT)
Age: 65
End: 2025-08-15

## 2025-08-15 DIAGNOSIS — M16.10 ARTHRITIS OF HIP, UNSPECIFIED LATERALITY: Primary | ICD-10-CM

## 2025-08-15 RX ORDER — LIDOCAINE 4 G/G
1 PATCH TOPICAL DAILY
Qty: 30 PATCH | Refills: 1 | Status: SHIPPED | OUTPATIENT
Start: 2025-08-15 | End: 2025-09-14

## 2025-08-28 DIAGNOSIS — I10 ESSENTIAL (PRIMARY) HYPERTENSION: ICD-10-CM

## 2025-08-28 RX ORDER — CLONIDINE HYDROCHLORIDE 0.3 MG/1
0.3 TABLET ORAL 2 TIMES DAILY
Qty: 60 TABLET | Refills: 1 | Status: SHIPPED | OUTPATIENT
Start: 2025-08-28

## 2025-09-03 ENCOUNTER — TELEPHONE (OUTPATIENT)
Age: 65
End: 2025-09-03

## 2025-09-03 DIAGNOSIS — R06.02 SOB (SHORTNESS OF BREATH): Primary | ICD-10-CM

## (undated) DEVICE — SNARE VASC L240CM LOOP W10MM SHTH DIA2.4MM RND STIFF CLD

## (undated) DEVICE — TUBING HYDR IRR --

## (undated) DEVICE — FORCEPS BX L240CM JAW DIA2.8MM L CAP W/ NDL MIC MESH TOOTH

## (undated) DEVICE — TRAP SURG QUAD PARABOLA SLOT DSGN SFTY SCRN TRAPEASE